# Patient Record
Sex: FEMALE | Race: OTHER | Employment: OTHER | URBAN - METROPOLITAN AREA
[De-identification: names, ages, dates, MRNs, and addresses within clinical notes are randomized per-mention and may not be internally consistent; named-entity substitution may affect disease eponyms.]

---

## 2017-01-26 DIAGNOSIS — Z12.31 ENCOUNTER FOR SCREENING MAMMOGRAM FOR MALIGNANT NEOPLASM OF BREAST: ICD-10-CM

## 2017-02-03 ENCOUNTER — GENERIC CONVERSION - ENCOUNTER (OUTPATIENT)
Dept: OTHER | Facility: OTHER | Age: 72
End: 2017-02-03

## 2017-05-03 ENCOUNTER — ALLSCRIPTS OFFICE VISIT (OUTPATIENT)
Dept: OTHER | Facility: OTHER | Age: 72
End: 2017-05-03

## 2017-05-03 DIAGNOSIS — M25.511 PAIN IN RIGHT SHOULDER: ICD-10-CM
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
## 2017-05-05 ENCOUNTER — GENERIC CONVERSION - ENCOUNTER (OUTPATIENT)
Dept: OTHER | Facility: OTHER | Age: 72
End: 2017-05-05

## 2017-05-24 ENCOUNTER — ALLSCRIPTS OFFICE VISIT (OUTPATIENT)
Dept: OTHER | Facility: OTHER | Age: 72
End: 2017-05-24

## 2017-05-25 ENCOUNTER — GENERIC CONVERSION - ENCOUNTER (OUTPATIENT)
Dept: OTHER | Facility: OTHER | Age: 72
End: 2017-05-25

## 2017-06-21 ENCOUNTER — ALLSCRIPTS OFFICE VISIT (OUTPATIENT)
Dept: OTHER | Facility: OTHER | Age: 72
End: 2017-06-21

## 2017-06-21 DIAGNOSIS — M79.671 PAIN OF RIGHT FOOT: ICD-10-CM

## 2017-06-21 DIAGNOSIS — M54.50 LOW BACK PAIN: ICD-10-CM

## 2017-06-21 DIAGNOSIS — M25.511 PAIN IN RIGHT SHOULDER: ICD-10-CM

## 2017-06-21 LAB
BILIRUB UR QL STRIP: NEGATIVE
CLARITY UR: NORMAL
COLOR UR: YELLOW
GLUCOSE (HISTORICAL): NEGATIVE
HGB UR QL STRIP.AUTO: NEGATIVE
KETONES UR STRIP-MCNC: NEGATIVE MG/DL
LEUKOCYTE ESTERASE UR QL STRIP: NEGATIVE
NITRITE UR QL STRIP: NEGATIVE
PH UR STRIP.AUTO: 5 [PH]
PROT UR STRIP-MCNC: NEGATIVE MG/DL
SP GR UR STRIP.AUTO: 1.03
UROBILINOGEN UR QL STRIP.AUTO: 0.2

## 2017-07-14 ENCOUNTER — GENERIC CONVERSION - ENCOUNTER (OUTPATIENT)
Dept: OTHER | Facility: OTHER | Age: 72
End: 2017-07-14

## 2017-07-26 ENCOUNTER — ALLSCRIPTS OFFICE VISIT (OUTPATIENT)
Dept: OTHER | Facility: OTHER | Age: 72
End: 2017-07-26

## 2017-10-23 ENCOUNTER — ALLSCRIPTS OFFICE VISIT (OUTPATIENT)
Dept: OTHER | Facility: OTHER | Age: 72
End: 2017-10-23

## 2017-10-23 DIAGNOSIS — E78.5 HYPERLIPIDEMIA: ICD-10-CM

## 2017-10-24 NOTE — PROGRESS NOTES
Assessment  1  Hyperlipidemia (272 4) (E78 5)   2  Vitamin D deficiency (268 9) (E55 9)   3  GERD without esophagitis (530 81) (K21 9)   4  Chronic lower back pain (724 2,338 29) (M54 5,G89 29)    Plan  Encounter for screening mammogram for malignant neoplasm of breast    · * MAMMO SCREENING BILATERAL W CAD; Status:Canceled - Scheduling; Hyperlipidemia    · (1) CBC/PLT/DIFF; Status:Active; Requested [id number];    · (1) COMPREHENSIVE METABOLIC PANEL; Status:Active; Requested [id number]:[id number];    · (1) LIPID PANEL FASTING W DIRECT LDL REFLEX; Status:Active; Requested [id number];    · (1) TSH; Status:Active; Requested RHR:[id number];    · Follow-up visit in 6 months Evaluation and Treatment  Follow-up  Status: Hold For - Scheduling   Requested for: 82SVE1277   · Eat a low fat and low cholesterol diet ; Status:Complete;   Done: [id number]  PMH: History of pneumococcal vaccination    · Prevnar 13 Intramuscular Suspension; INJECT 0 5  ML Intramuscular; To Be Done:  21Jun2017  Vitamin D deficiency    · (1) VITAMIN D 25-HYDROXY; Status:Active; Requested [id number];     Discussion/Summary  Discussion Summary:   Doing well and will check labs  Continue current treatment  Keep f/u with DPM  Refusing FIT and colonoscopy  Unable to get vaccines  Medication SE Review and Pt Understands Tx: Possible side effects of new medications were reviewed with the patient/guardian today  The treatment plan was reviewed with the patient/guardian  The patient/guardian understands and agrees with the treatment plan      Chief Complaint  Chief Complaint Free Text Note Form: RTN - Esophageal Reflux  Renetta Claudia Taylor No new complaints    still has right heel spur/pain  History of Present Illness  HPI: HF RTC for f/u hyperlipidemia, GERD, etc  Doing ok  Still dealing with and heel spur and seeing DPM  Due for labs  Unable to get the flu vaccine and refusing pneumonia  Refusing colonoscopy   Mammo and dexa due next vist    Chronic Pain (Follow-Up): The patient is being seen for follow-up of chronic neck pain and chronic back pain  The patient reports no change in the condition  There are no comorbid illnesses  She has had no significant interval events  Interval symptoms:  stable neck pain,-- stable back pain,-- denies upper extremity pain-- and-- denies lower extremity pain  No associated symptoms are reported  Medications include acetaminophen and nonsteroidal anti-inflammatory drugs  Medications:  the patient is adherent to her medication regimen, but-- she denies medication side effects  Disease management:  the patient is doing well with her goals  Gastroesophageal Reflux Disease (Follow-Up): The patient is being seen for follow-up of gastroesophageal reflux disease  The patient reports doing well  There are no comorbid illnesses  She has had no significant interval events  The patient is currently asymptomatic  No associated symptoms are reported  Medications include pantoprazole (Protonix)  Medications:  the patient is adherent to her medication regimen, but-- she denies medication side effects  Disease management:  the patient is doing well with her goals  Hyperlipidemia (Follow-Up): The patient states her hyperlipidemia has been under good control since the last visit  She has no comorbid illnesses  She has no significant interval events  Symptoms: The patient is currently asymptomatic  Associated symptoms include no focal neurologic deficits-- and-- no memory loss  Medications: The patient is not currently on any medications for her hyperlipidemia  The patient is doing well with her hyperlipidemia goals  The patient is due for a lipid panel  Review of Systems  Complete-Female:   Constitutional: no fever,-- not feeling poorly,-- no chills-- and-- not feeling tired  Cardiovascular: no chest pain,-- no intermittent leg claudication,-- no palpitations-- and-- no lower extremity edema     Respiratory: no shortness of breath,-- no cough,-- no orthopnea,-- no wheezing,-- no shortness of breath during exertion-- and-- no PND  Gastrointestinal: no abdominal pain,-- no nausea,-- no constipation-- and-- no diarrhea  Genitourinary: no dysuria  Musculoskeletal: arthralgias-- and-- joint stiffness, but-- no joint swelling  Integumentary: No complaints of skin rash or lesions, no itching, no skin wounds, no breast pain or lump  Neurological: no headache,-- no confusion-- and-- no convulsions  Psychiatric: no anxiety-- and-- no depression  Endocrine: No complaints of proptosis, no hot flashes, no muscle weakness, no deepening of the voice, no feelings of weakness  Hematologic/Lymphatic: No complaints of swollen glands, no swollen glands in the neck, does not bleed easily, does not bruise easily  Active Problems  1  Chronic lower back pain (724 2,338 29) (M54 5,G89 29)   2  Chronic right shoulder pain (719 41,338 29) (M25 511,G89 29)   3  Encounter for screening mammogram for malignant neoplasm of breast (V76 12) (Z12 31)   4  Gait abnormality (781 2) (R26 9)   5  GERD without esophagitis (530 81) (K21 9)   6  Hyperlipidemia (272 4) (E78 5)   7  Nephrolithiasis (592 0) (N20 0)   8  Post-menopausal (V49 81) (Z78 0)   9  Sciatica of left side (724 3) (M54 32)   10  Screening for colon cancer (V76 51) (Z12 11)   11  Screening for genitourinary condition (V81 6) (Z13 89)   12  Screening for neurological condition (V80 09) (Z13 89)   13  Vitamin D deficiency (268 9) (E55 9)    Past Medical History  1  History of Former tobacco use (V15 82) (K93 254)   2  History of esophageal reflux (V12 79) (Z87 19)  Active Problems And Past Medical History Reviewed: The active problems and past medical history were reviewed and updated today  Surgical History  1  History of Oral Surgery Tooth Extraction   2  History of Tonsillectomy   3  History of Tubal Ligation  Surgical History Reviewed:    The surgical history was reviewed and updated today  Family History  Mother    1  Family history of Sheehans syndrome  Father    2  Family history of Aneurysm   3  Family history of kidney stones (V18 69) (Z84 1)  Sister    4  Family history of hypertension (V17 49) (Z82 49)  Brother    5  Family history of Alcoholic cirrhosis of liver  Family History Reviewed: The family history was reviewed and updated today  Social History   · Former smoker (Z72 08) (M22 033)   ·    · Occasional alcohol use  Social History Reviewed: The social history was reviewed and updated today  The social history was reviewed and is unchanged  Current Meds   1  Aleve 220 MG Oral Tablet Recorded   2  Pantoprazole Sodium 20 MG Oral Tablet Delayed Release; TAKE 1 TABLET DAILY; Therapy: 71NWS7678 to (Evaluate:05Jan2017); Last Rx:11Jan2016 Ordered  Medication List Reviewed: The medication list was reviewed and updated today  Allergies  1  Fluarix SUSP   2  Lasix TABS   3  PredniSONE TABS    Vitals  Vital Signs    Recorded: 76PXI7587 02:40PM   Temperature 98 6 F   Heart Rate 78   Respiration 16   Systolic 606   Diastolic 60   Height 4 ft 11 in   Weight 172 lb    BMI Calculated 34 74   BSA Calculated 1 73     Physical Exam    Constitutional   General appearance: No acute distress, well appearing and well nourished  Eyes   Conjunctiva and lids: No swelling, erythema or discharge  Pupils and irises: Equal, round and reactive to light  Ears, Nose, Mouth, and Throat   Oropharynx: Normal with no erythema, edema, exudate or lesions  Pulmonary   Respiratory effort: No increased work of breathing or signs of respiratory distress  Auscultation of lungs: Clear to auscultation  Cardiovascular   Auscultation of heart: Normal rate and rhythm, normal S1 and S2, without murmurs  Examination of extremities for edema and/or varicosities: Normal     Carotid pulses: Normal     Abdomen   Abdomen: Non-tender, no masses      Musculoskeletal   Gait and station: Normal     Psychiatric   Orientation to person, place, and time: Normal     Mood and affect: Normal          Signatures   Electronically signed by : LIZZY Melton ; Oct 23 2017  2:57PM EST                       (Author)

## 2017-10-27 ENCOUNTER — APPOINTMENT (OUTPATIENT)
Dept: LAB | Facility: CLINIC | Age: 72
End: 2017-10-27
Payer: COMMERCIAL

## 2017-10-27 ENCOUNTER — TRANSCRIBE ORDERS (OUTPATIENT)
Dept: LAB | Facility: CLINIC | Age: 72
End: 2017-10-27

## 2017-10-27 DIAGNOSIS — E78.5 HYPERLIPIDEMIA: ICD-10-CM

## 2017-10-27 LAB
25(OH)D3 SERPL-MCNC: 15.5 NG/ML (ref 30–100)
ALBUMIN SERPL BCP-MCNC: 3.7 G/DL (ref 3.5–5)
ALP SERPL-CCNC: 89 U/L (ref 46–116)
ALT SERPL W P-5'-P-CCNC: 47 U/L (ref 12–78)
ANION GAP SERPL CALCULATED.3IONS-SCNC: 4 MMOL/L (ref 4–13)
AST SERPL W P-5'-P-CCNC: 33 U/L (ref 5–45)
BASOPHILS # BLD AUTO: 0.05 THOUSANDS/ΜL (ref 0–0.1)
BASOPHILS NFR BLD AUTO: 1 % (ref 0–1)
BILIRUB SERPL-MCNC: 0.51 MG/DL (ref 0.2–1)
BUN SERPL-MCNC: 13 MG/DL (ref 5–25)
CALCIUM SERPL-MCNC: 9.3 MG/DL (ref 8.3–10.1)
CHLORIDE SERPL-SCNC: 103 MMOL/L (ref 100–108)
CHOLEST SERPL-MCNC: 218 MG/DL (ref 50–200)
CO2 SERPL-SCNC: 31 MMOL/L (ref 21–32)
CREAT SERPL-MCNC: 0.61 MG/DL (ref 0.6–1.3)
EOSINOPHIL # BLD AUTO: 0.69 THOUSAND/ΜL (ref 0–0.61)
EOSINOPHIL NFR BLD AUTO: 13 % (ref 0–6)
ERYTHROCYTE [DISTWIDTH] IN BLOOD BY AUTOMATED COUNT: 14.2 % (ref 11.6–15.1)
GFR SERPL CREATININE-BSD FRML MDRD: 91 ML/MIN/1.73SQ M
GLUCOSE P FAST SERPL-MCNC: 93 MG/DL (ref 65–99)
HCT VFR BLD AUTO: 39.6 % (ref 34.8–46.1)
HDLC SERPL-MCNC: 65 MG/DL (ref 40–60)
HGB BLD-MCNC: 12.7 G/DL (ref 11.5–15.4)
LDLC SERPL CALC-MCNC: 128 MG/DL (ref 0–100)
LYMPHOCYTES # BLD AUTO: 2.05 THOUSANDS/ΜL (ref 0.6–4.47)
LYMPHOCYTES NFR BLD AUTO: 38 % (ref 14–44)
MCH RBC QN AUTO: 28.3 PG (ref 26.8–34.3)
MCHC RBC AUTO-ENTMCNC: 32.1 G/DL (ref 31.4–37.4)
MCV RBC AUTO: 88 FL (ref 82–98)
MONOCYTES # BLD AUTO: 0.35 THOUSAND/ΜL (ref 0.17–1.22)
MONOCYTES NFR BLD AUTO: 7 % (ref 4–12)
NEUTROPHILS # BLD AUTO: 2.2 THOUSANDS/ΜL (ref 1.85–7.62)
NEUTS SEG NFR BLD AUTO: 41 % (ref 43–75)
NRBC BLD AUTO-RTO: 0 /100 WBCS
PLATELET # BLD AUTO: 244 THOUSANDS/UL (ref 149–390)
PMV BLD AUTO: 12 FL (ref 8.9–12.7)
POTASSIUM SERPL-SCNC: 4.7 MMOL/L (ref 3.5–5.3)
PROT SERPL-MCNC: 7.4 G/DL (ref 6.4–8.2)
RBC # BLD AUTO: 4.48 MILLION/UL (ref 3.81–5.12)
SODIUM SERPL-SCNC: 138 MMOL/L (ref 136–145)
TRIGL SERPL-MCNC: 126 MG/DL
TSH SERPL DL<=0.05 MIU/L-ACNC: 2.07 UIU/ML (ref 0.36–3.74)
WBC # BLD AUTO: 5.35 THOUSAND/UL (ref 4.31–10.16)

## 2017-10-27 PROCEDURE — 85025 COMPLETE CBC W/AUTO DIFF WBC: CPT

## 2017-10-27 PROCEDURE — 84443 ASSAY THYROID STIM HORMONE: CPT

## 2017-10-27 PROCEDURE — 80053 COMPREHEN METABOLIC PANEL: CPT

## 2017-10-27 PROCEDURE — 82306 VITAMIN D 25 HYDROXY: CPT

## 2017-10-27 PROCEDURE — 36415 COLL VENOUS BLD VENIPUNCTURE: CPT

## 2017-10-27 PROCEDURE — 80061 LIPID PANEL: CPT

## 2017-10-28 ENCOUNTER — GENERIC CONVERSION - ENCOUNTER (OUTPATIENT)
Dept: OTHER | Facility: OTHER | Age: 72
End: 2017-10-28

## 2017-11-09 ENCOUNTER — GENERIC CONVERSION - ENCOUNTER (OUTPATIENT)
Dept: OTHER | Facility: OTHER | Age: 72
End: 2017-11-09

## 2018-01-12 VITALS
DIASTOLIC BLOOD PRESSURE: 70 MMHG | HEART RATE: 76 BPM | SYSTOLIC BLOOD PRESSURE: 102 MMHG | WEIGHT: 172 LBS | RESPIRATION RATE: 16 BRPM | HEIGHT: 59 IN | TEMPERATURE: 96.8 F | BODY MASS INDEX: 34.68 KG/M2

## 2018-01-12 NOTE — PROGRESS NOTES
Assessment    1  Sciatica of left side (724 3) (M54 32)   2  Left hip pain (719 45) (M25 552)   3  Severe left inguinal pain (789 09) (R10 30)   4  Hyperlipidemia (272 4) (E78 5)   5  Vitamin D deficiency (268 9) (E55 9)    Plan  Left hip pain, Sciatica of left side, Severe left inguinal pain    · Cyclobenzaprine HCl - 5 MG Oral Tablet; TAKE 1 TABLET 3 TIMES DAILY AS NEEDED   · PredniSONE 10 MG Oral Tablet; 40mg po q day x 5, 30mg po q day x5, 20mg po q day x5,  10mg po q day x 5 then d/c   · Betamethasone Sod Phos & Acet 6 (3-3) MG/ML Injection Suspension   · *1 - [facility] Physical Therapy  Consult  Status: Active  Requested for: 78NUZ7173  01:00PM  Care Summary provided  : Yes    Discussion/Summary  Discussion Summary:   No improvement with NSAID's and xray negative  Start PT and trial of steroids  NO NSAID's and will add muscle relaxant  Start otc vit d and pt wants to try a STEP I diet first  RTC two weeks  May need MRI or Ortho eveal       Chief Complaint  Chief Complaint Free Text Note Form: Pt is here for a f/u visit on hip pain and review x-ray  Pt stated it hurts the most when she is sleeping, she starting sleeping with a pillow between her legs  Pt has no been able to taker her medication yet because they have not arrived  History of Present Illness  HPI: HF RTC for f/u left hip and groin pain felt to be due to MS reasons  Started on NSAID's and little improvement  Xray was negative  Labs done and Total Cholesterol and LDL elevated and Vit d level low  No new c/o's  Pain is a 8/10 and has difficulty walking at times  Now reports pain over the left buttock and radiates down into the hip and groin  Review of Systems  Complete-Female:   Constitutional: feeling poorly, but no fever, no chills and not feeling tired  Cardiovascular: no chest pain  Respiratory: no shortness of breath  Gastrointestinal: no abdominal pain, no nausea, no constipation and no diarrhea     Genitourinary: No complaints of dysuria, no incontinence, no pelvic pain, no dysmenorrhea, no vaginal discharge or bleeding  Musculoskeletal: as noted in HPI  Active Problems    1  Abnormal EKG (794 31) (R94 31)   2  Esophageal reflux (530 81) (K21 9)   3  Gait abnormality (781 2) (R26 9)   4  Hyperlipidemia (272 4) (E78 5)   5  Left hip pain (719 45) (M25 552)   6  Nephrolithiasis (592 0) (N20 0)   7  Post-menopausal (V49 81) (Z78 0)   8  Severe left inguinal pain (789 09) (R10 30)   9  Vitamin D deficiency (268 9) (E55 9)    Past Medical History    1  History of Former tobacco use (V15 82) (D14 813)   2  History of esophageal reflux (V12 79) (Z87 19)  Active Problems And Past Medical History Reviewed: The active problems and past medical history were reviewed and updated today  Surgical History    1  History of Oral Surgery Tooth Extraction   2  History of Tonsillectomy   3  History of Tubal Ligation  Surgical History Reviewed: The surgical history was reviewed and updated today  Family History    1  Family history of Sheehans syndrome    2  Family history of Aneurysm   3  Family history of kidney stones (V18 69) (Z84 1)    4  Family history of hypertension (V17 49) (Z82 49)    5  Family history of Alcoholic cirrhosis of liver  Family History Reviewed: The family history was reviewed and updated today  Social History    · Former smoker (R05 70) (I53 490)   ·    · Occasional alcohol use  Social History Reviewed: The social history was reviewed and updated today  The social history was reviewed and is unchanged  Current Meds   1  EC-Naprosyn 500 MG Oral Tablet Delayed Release; TAKE (1) TABLET BY MOUTH TWICE A DAY; Therapy: 79JSN4941 to (Evaluate:05Jan2017)  Requested for: 37MRS3096; Last Rx:11Jan2016   Ordered   2  Pantoprazole Sodium 20 MG Oral Tablet Delayed Release; TAKE 1 TABLET DAILY; Therapy: 61GOR0697 to (Evaluate:05Jan2017);  Last Rx:11Jan2016 Ordered  Medication List Reviewed: The medication list was reviewed and updated today  Allergies    1  Fluarix Intramuscular Suspension   2  Lasix TABS    Vitals  Vital Signs [Data Includes: Current Encounter]    Recorded: 00FLS2823 11:32AM   Temperature 96 9 F   Heart Rate 73   Respiration 17   Systolic 295   Diastolic 70   Height 4 ft 11 in   Weight 174 lb 2 oz   BMI Calculated 35 17   BSA Calculated 1 74     Physical Exam    Constitutional   General appearance: No acute distress, well appearing and well nourished  Eyes   Conjunctiva and lids: No swelling, erythema or discharge  Pupils and irises: Equal, round and reactive to light  Ears, Nose, Mouth, and Throat   Oropharynx: Normal with no erythema, edema, exudate or lesions  Pulmonary   Respiratory effort: No increased work of breathing or signs of respiratory distress  Auscultation of lungs: Clear to auscultation  Cardiovascular   Auscultation of heart: Normal rate and rhythm, normal S1 and S2, without murmurs  Examination of extremities for edema and/or varicosities: Normal     Abdomen   Abdomen: Non-tender, no masses  Musculoskeletal   Gait and station: Normal     Additional Exam:  LS spine/left hip w/o gross deformity, increase temp, erythema, or swelling  Tenderness over the left sciatic area, left trochanteric bursa  ROM and joint integrity intact  ROM LS spine limited all planes by 50%  DTR 2/4  Future Appointments    Date/Time Provider Specialty Site   02/17/2016 11:30 AM LIZZY Friedman   Internal Medicine [address] [place] [address]     Signatures   Electronically signed by : LIZZY Gurrola ; Jan 27 2016  2:40PM EST                       (Author)

## 2018-01-13 VITALS
HEIGHT: 59 IN | RESPIRATION RATE: 16 BRPM | DIASTOLIC BLOOD PRESSURE: 68 MMHG | SYSTOLIC BLOOD PRESSURE: 106 MMHG | HEART RATE: 76 BPM | WEIGHT: 172 LBS | TEMPERATURE: 98.9 F | BODY MASS INDEX: 34.68 KG/M2

## 2018-01-13 VITALS
SYSTOLIC BLOOD PRESSURE: 120 MMHG | WEIGHT: 171 LBS | TEMPERATURE: 97.5 F | BODY MASS INDEX: 34.47 KG/M2 | DIASTOLIC BLOOD PRESSURE: 68 MMHG | HEIGHT: 59 IN | HEART RATE: 82 BPM | RESPIRATION RATE: 18 BRPM

## 2018-01-13 VITALS
TEMPERATURE: 97 F | DIASTOLIC BLOOD PRESSURE: 70 MMHG | RESPIRATION RATE: 18 BRPM | HEART RATE: 84 BPM | BODY MASS INDEX: 34.68 KG/M2 | HEIGHT: 59 IN | WEIGHT: 172 LBS | SYSTOLIC BLOOD PRESSURE: 114 MMHG

## 2018-01-14 NOTE — RESULT NOTES
Verified Results  (1) LIPID PANEL FASTING W DIRECT LDL REFLEX 27Oct2017 01:11PM Pita De León Order Number: [id number]     Test Name Result Flag Reference   CHOLESTEROL 218 mg/dL H    LDL CHOLESTEROL CALCULATED 128 mg/dL H 0-100   Triglyceride:        Normal <150 mg/dl   Borderline High 150-199 mg/dl   High 200-499 mg/dl   Very High >499 mg/dl      Cholesterol:       Desirable <200 mg/dl    Borderline High 200-239 mg/dl    High >239 mg/dl      HDL Cholesterol:       High>59 mg/dL    Low <41 mg/dL      HDL Cholesterol:       High>59 mg/dL    Low <41 mg/dL      This screening LDL is a calculated result  It does not have the accuracy of the Direct Measured LDL in the monitoring of patients with hyperlipidemia and/or statin therapy  Direct Measure LDL (IQL098) must be ordered separately in these patients  TRIGLYCERIDES 126 mg/dL  <=150   Specimen collection should occur prior to N-Acetylcysteine or Metamizole administration due to the potential for falsely depressed results  HDL,DIRECT 65 mg/dL H 40-60   Specimen collection should occur prior to Metamizole administration due to the potential for falsley depressed results  (1) CBC/PLT/DIFF 27Oct2017 01:11PM Kamille Manzano     Test Name Result Flag Reference   WBC COUNT 5 35 Thousand/uL  4 31-10 16   RBC COUNT 4 48 Million/uL  3 81-5 12   HEMOGLOBIN 12 7 g/dL  11 5-15 4   HEMATOCRIT 39 6 %  34 8-46  1   MCV 88 fL  82-98   MCH 28 3 pg  26 8-34 3   MCHC 32 1 g/dL  31 4-37 4   RDW 14 2 %  11 6-15 1   MPV 12 0 fL  8 9-12 7   PLATELET COUNT 028 Thousands/uL  149-390   nRBC AUTOMATED 0 /100 WBCs     NEUTROPHILS RELATIVE PERCENT 41 % L 43-75   LYMPHOCYTES RELATIVE PERCENT 38 %  14-44   MONOCYTES RELATIVE PERCENT 7 %  4-12   EOSINOPHILS RELATIVE PERCENT 13 % H 0-6   BASOPHILS RELATIVE PERCENT 1 %  0-1   NEUTROPHILS ABSOLUTE COUNT 2 20 Thousands/? ??L  1 85-7 62   LYMPHOCYTES ABSOLUTE COUNT 2 05 Thousands/? ??L  0 60-4 47   MONOCYTES ABSOLUTE COUNT 0 35 Thousand/? ??L  0 17-1 22   EOSINOPHILS ABSOLUTE COUNT 0 69 Thousand/? ??L H 0 00-0 61   BASOPHILS ABSOLUTE COUNT 0 05 Thousands/? ??L  0 00-0 10   This is a patient instruction: This test is non-fasting  Please drink two glasses of water morning of bloodwork  (1) VITAMIN D 25-HYDROXY [id number] 01:11PM Ivana Carey     Test Name Result Flag Reference   VIT D 25-HYDROX 15 5 ng/mL L 30 0-100 0   This assay is a certified procedure of the CDC Vitamin D Standardization Certification Program (VDSCP)     Deficiency <20ng/ml   Insufficiency 20-30ng/ml   Sufficient  ng/ml     *Patients undergoing fluorescein dye angiography may retain small amounts of fluorescein in the body for 48-72 hours post procedure  Samples containing fluorescein can produce falsely elevated Vitamin D values  If the patient had this procedure, a specimen should be resubmitted post fluorescein clearance  (1) COMPREHENSIVE METABOLIC PANEL [id number] 45:17VF Ivana Carey     Test Name Result Flag Reference   SODIUM 138 mmol/L  136-145   POTASSIUM 4 7 mmol/L  3 5-5 3   CHLORIDE 103 mmol/L  100-108   CARBON DIOXIDE 31 mmol/L  21-32   ANION GAP (CALC) 4 mmol/L  4-13   BLOOD UREA NITROGEN 13 mg/dL  5-25   CREATININE 0 61 mg/dL  0 60-1 30   Standardized to IDMS reference method   CALCIUM 9 3 mg/dL  8 3-10 1   BILI, TOTAL 0 51 mg/dL  0 20-1 00   ALK PHOSPHATAS 89 U/L     ALT (SGPT) 47 U/L  12-78   Specimen collection should occur prior to Sulfasalazine and/or Sulfapyridine administration due to the potential for falsely depressed results  AST(SGOT) 33 U/L  5-45   Specimen collection should occur prior to Sulfasalazine administration due to the potential for falsely depressed results     ALBUMIN 3 7 g/dL  3 5-5 0   TOTAL PROTEIN 7 4 g/dL  6 4-8 2   eGFR 91 ml/min/1 73sq m     National Kidney Disease Education Program recommendations are as follows:  GFR calculation is accurate only with a steady state creatinine  Chronic Kidney disease less than 60 ml/min/1 73 sq  meters  Kidney failure less than 15 ml/min/1 73 sq  meters  GLUCOSE FASTING 93 mg/dL  65-99   Specimen collection should occur prior to Sulfasalazine administration due to the potential for falsely depressed results  Specimen collection should occur prior to Sulfapyridine administration due to the potential for falsely elevated results  (1) TSH [id number] 01:11PM Anuj Montana     Test Name Result Flag Reference   TSH 2 070 uIU/mL  0 358-3 740   This is a patient instruction: This test is non-fasting  Please drink two glasses of water morning of bloodwork  Patients undergoing fluorescein dye angiography may retain small amounts of fluorescein in the body for 48-72 hours post procedure  Samples containing fluorescein can produce falsely depressed TSH values  If the patient had this procedure,a specimen should be resubmitted post fluorescein clearance            The recommended reference ranges for TSH during pregnancy are as follows:  First trimester 0 1 to 2 5 uIU/mL  Second trimester  0 2 to 3 0 uIU/mL  Third trimester 0 3 to 3 0 uIU/m

## 2018-01-15 VITALS
RESPIRATION RATE: 16 BRPM | BODY MASS INDEX: 34.68 KG/M2 | HEIGHT: 59 IN | WEIGHT: 172 LBS | SYSTOLIC BLOOD PRESSURE: 118 MMHG | HEART RATE: 78 BPM | TEMPERATURE: 98.6 F | DIASTOLIC BLOOD PRESSURE: 60 MMHG

## 2018-03-06 ENCOUNTER — TRANSCRIBE ORDERS (OUTPATIENT)
Dept: RADIOLOGY | Facility: CLINIC | Age: 73
End: 2018-03-06

## 2018-03-06 ENCOUNTER — OFFICE VISIT (OUTPATIENT)
Dept: INTERNAL MEDICINE CLINIC | Facility: CLINIC | Age: 73
End: 2018-03-06
Payer: COMMERCIAL

## 2018-03-06 ENCOUNTER — APPOINTMENT (OUTPATIENT)
Dept: RADIOLOGY | Facility: CLINIC | Age: 73
End: 2018-03-06
Payer: COMMERCIAL

## 2018-03-06 VITALS
WEIGHT: 177 LBS | RESPIRATION RATE: 16 BRPM | DIASTOLIC BLOOD PRESSURE: 68 MMHG | SYSTOLIC BLOOD PRESSURE: 106 MMHG | BODY MASS INDEX: 35.68 KG/M2 | HEART RATE: 88 BPM | HEIGHT: 59 IN | TEMPERATURE: 98.7 F

## 2018-03-06 DIAGNOSIS — M54.41 CHRONIC RIGHT-SIDED LOW BACK PAIN WITH RIGHT-SIDED SCIATICA: Primary | ICD-10-CM

## 2018-03-06 DIAGNOSIS — G89.29 CHRONIC RIGHT-SIDED LOW BACK PAIN WITH RIGHT-SIDED SCIATICA: Primary | ICD-10-CM

## 2018-03-06 DIAGNOSIS — IMO0001 RADICULAR PAIN OF RIGHT LOWER BACK: ICD-10-CM

## 2018-03-06 LAB
SL AMB  POCT GLUCOSE, UA: NEGATIVE
SL AMB LEUKOCYTE ESTERASE,UA: NEGATIVE
SL AMB POCT BILIRUBIN,UA: NEGATIVE
SL AMB POCT BLOOD,UA: NEGATIVE
SL AMB POCT COLOR,UA: YELLOW
SL AMB POCT KETONES,UA: NEGATIVE
SL AMB POCT NITRITE,UA: NEGATIVE
SL AMB POCT RBC UA: NEGATIVE
SL AMB POCT SPECIFIC GRAVITY,UA: 1.02
SL AMB POCT WBC UA: NEGATIVE

## 2018-03-06 PROCEDURE — 81000 URINALYSIS NONAUTO W/SCOPE: CPT | Performed by: INTERNAL MEDICINE

## 2018-03-06 PROCEDURE — 1101F PT FALLS ASSESS-DOCD LE1/YR: CPT | Performed by: INTERNAL MEDICINE

## 2018-03-06 PROCEDURE — 99213 OFFICE O/P EST LOW 20 MIN: CPT | Performed by: INTERNAL MEDICINE

## 2018-03-06 PROCEDURE — 72110 X-RAY EXAM L-2 SPINE 4/>VWS: CPT

## 2018-03-06 PROCEDURE — 3725F SCREEN DEPRESSION PERFORMED: CPT | Performed by: INTERNAL MEDICINE

## 2018-03-06 PROCEDURE — 3008F BODY MASS INDEX DOCD: CPT | Performed by: INTERNAL MEDICINE

## 2018-03-06 RX ORDER — CELECOXIB 200 MG/1
1 CAPSULE ORAL DAILY
COMMUNITY
Start: 2017-07-26 | End: 2018-04-23

## 2018-03-06 RX ORDER — NAPROXEN SODIUM 220 MG
TABLET ORAL
COMMUNITY
End: 2019-07-22

## 2018-03-06 RX ORDER — PANTOPRAZOLE SODIUM 20 MG/1
1 TABLET, DELAYED RELEASE ORAL DAILY
COMMUNITY
Start: 2016-01-08 | End: 2019-07-22

## 2018-03-06 RX ORDER — DICLOFENAC SODIUM 75 MG/1
50 TABLET, DELAYED RELEASE ORAL 2 TIMES DAILY
COMMUNITY
End: 2018-04-23 | Stop reason: SDUPTHER

## 2018-03-06 NOTE — PROGRESS NOTES
Assessment/Plan:    No problem-specific Assessment & Plan notes found for this encounter  Diagnoses and all orders for this visit:    Chronic right-sided low back pain with right-sided sciatica  -     POCT urine dip non-auto scope    Radicular pain of right lower back  -     XR spine lumbar minimum 4 views non injury; Future  -     MRI lumbar spine wo contrast; Future    Other orders  -     pantoprazole (PROTONIX) 20 mg tablet; Take 1 tablet by mouth daily  -     celecoxib (CELEBREX) 200 mg capsule; Take 1 capsule by mouth daily  -     naproxen sodium (ALEVE) 220 MG tablet; Take by mouth  -     diclofenac (VOLTAREN) 75 mg EC tablet; Take 50 mg by mouth 2 (two) times a day      A/P: Refusing NSAID's, steroids, muscle relaxants  Will check xray and MRI  Discussed pain management, but doesn't want injections  Wants to know what is wrong  UA was negative and not classic for a renal stone  RTC after the imaging studies  Subjective:      Patient ID: Nayely Guzmán is a 67 y o  female  HF presents c/o continued right LBP with radiation into her right buttocks and anterior thigh  No injury and has been getting PT for both her back and shoulder  Taking NSAID's w/o relief  Reports urinating frequently and has a hx of renal stones, but no gross blood and no bowel or bladder incontinence  The following portions of the patient's history were reviewed and updated as appropriate:   She  has no past medical history on file  She There are no active problems to display for this patient  She  has a past surgical history that includes Tubal ligation and Tonsillectomy  Her family history includes No Known Problems in her father and mother  She  reports that she has quit smoking  She has never used smokeless tobacco  She reports that she drinks alcohol  She reports that she does not use drugs    Current Outpatient Prescriptions   Medication Sig Dispense Refill    diclofenac (VOLTAREN) 75 mg EC tablet Take 50 mg by mouth 2 (two) times a day      naproxen sodium (ALEVE) 220 MG tablet Take by mouth      pantoprazole (PROTONIX) 20 mg tablet Take 1 tablet by mouth daily      celecoxib (CELEBREX) 200 mg capsule Take 1 capsule by mouth daily       No current facility-administered medications for this visit  No current outpatient prescriptions on file prior to visit  No current facility-administered medications on file prior to visit  She is allergic to flu virus vaccine; furosemide; and prednisone       Review of Systems   Constitutional: Negative for activity change, chills, diaphoresis, fatigue and fever  Respiratory: Negative for cough, chest tightness, shortness of breath and wheezing  Cardiovascular: Negative for chest pain, palpitations and leg swelling  Gastrointestinal: Negative for abdominal pain, constipation, diarrhea, nausea and vomiting  Genitourinary: Positive for flank pain  Negative for decreased urine volume, difficulty urinating, dysuria, frequency and hematuria  Musculoskeletal: Positive for back pain  Negative for arthralgias, gait problem and myalgias  Neurological: Negative for light-headedness and headaches  Psychiatric/Behavioral: Negative for confusion  The patient is not nervous/anxious  Objective:      /68   Pulse 88   Temp 98 7 °F (37 1 °C) (Tympanic)   Resp 16   Ht 4' 11" (1 499 m)   Wt 80 3 kg (177 lb)   BMI 35 75 kg/m²          Physical Exam   Constitutional: She is oriented to person, place, and time  She appears well-developed and well-nourished  No distress  HENT:   Head: Normocephalic and atraumatic  Mouth/Throat: Oropharynx is clear and moist    Eyes: Conjunctivae and EOM are normal  Pupils are equal, round, and reactive to light  Cardiovascular: Normal rate, regular rhythm and normal heart sounds  Pulmonary/Chest: Effort normal and breath sounds normal  No respiratory distress  She has no wheezes  Abdominal: Soft   Bowel sounds are normal  She exhibits no distension  There is no tenderness  Musculoskeletal:   LS spine w/o gross deformity, increase temp, erythema, or swelling  No lesions  ROM decrease in all planes by 50%  Tenderness w/o spasms right L3-S1  LE rom and strength 5/5 Tone wnl  No gait abnormality  Neurological: She is alert and oriented to person, place, and time  Psychiatric: She has a normal mood and affect  Her behavior is normal  Judgment and thought content normal    Nursing note and vitals reviewed

## 2018-04-23 ENCOUNTER — OFFICE VISIT (OUTPATIENT)
Dept: INTERNAL MEDICINE CLINIC | Facility: CLINIC | Age: 73
End: 2018-04-23
Payer: COMMERCIAL

## 2018-04-23 VITALS
HEIGHT: 59 IN | RESPIRATION RATE: 18 BRPM | TEMPERATURE: 97.9 F | HEART RATE: 80 BPM | WEIGHT: 172 LBS | SYSTOLIC BLOOD PRESSURE: 112 MMHG | BODY MASS INDEX: 34.68 KG/M2 | DIASTOLIC BLOOD PRESSURE: 68 MMHG

## 2018-04-23 DIAGNOSIS — Z11.59 NEED FOR HEPATITIS C SCREENING TEST: ICD-10-CM

## 2018-04-23 DIAGNOSIS — M54.5 CHRONIC LOW BACK PAIN, UNSPECIFIED BACK PAIN LATERALITY, WITH SCIATICA PRESENCE UNSPECIFIED: ICD-10-CM

## 2018-04-23 DIAGNOSIS — M54.50 RIGHT-SIDED LOW BACK PAIN WITHOUT SCIATICA, UNSPECIFIED CHRONICITY: Primary | ICD-10-CM

## 2018-04-23 DIAGNOSIS — E78.2 MIXED HYPERLIPIDEMIA: ICD-10-CM

## 2018-04-23 DIAGNOSIS — Z12.11 SCREEN FOR COLON CANCER: ICD-10-CM

## 2018-04-23 DIAGNOSIS — Z12.39 SCREENING FOR BREAST CANCER: ICD-10-CM

## 2018-04-23 DIAGNOSIS — E55.9 VITAMIN D DEFICIENCY: ICD-10-CM

## 2018-04-23 DIAGNOSIS — K21.9 GERD WITHOUT ESOPHAGITIS: ICD-10-CM

## 2018-04-23 DIAGNOSIS — Z13.820 SCREENING FOR OSTEOPOROSIS: ICD-10-CM

## 2018-04-23 DIAGNOSIS — G89.29 CHRONIC LOW BACK PAIN, UNSPECIFIED BACK PAIN LATERALITY, WITH SCIATICA PRESENCE UNSPECIFIED: ICD-10-CM

## 2018-04-23 PROBLEM — M25.511 CHRONIC RIGHT SHOULDER PAIN: Status: ACTIVE | Noted: 2017-05-03

## 2018-04-23 PROCEDURE — 99214 OFFICE O/P EST MOD 30 MIN: CPT | Performed by: INTERNAL MEDICINE

## 2018-04-23 RX ORDER — DICLOFENAC SODIUM 75 MG/1
75 TABLET, DELAYED RELEASE ORAL 2 TIMES DAILY
Qty: 30 TABLET | Refills: 5 | Status: SHIPPED | OUTPATIENT
Start: 2018-04-23 | End: 2018-11-19

## 2018-04-23 NOTE — PROGRESS NOTES
Assessment/Plan:    No problem-specific Assessment & Plan notes found for this encounter  Diagnoses and all orders for this visit:    Right-sided low back pain without sciatica, unspecified chronicity  -     diclofenac (VOLTAREN) 75 mg EC tablet; Take 1 tablet (75 mg total) by mouth 2 (two) times a day    Mixed hyperlipidemia  -     Comprehensive metabolic panel; Future  -     LDL cholesterol, direct; Future  -     Triglycerides; Future    GERD without esophagitis    Chronic low back pain, unspecified back pain laterality, with sciatica presence unspecified    Vitamin D deficiency  -     Vitamin D 1,25 dihydroxy; Future    Screen for colon cancer  -     Occult Bloood,Fecal Immunochemical; Future    Screening for breast cancer  -     Mammo screening bilateral w 3d & cad; Future    Screening for osteoporosis  -     DXA bone density spine hip and pelvis; Future    Need for hepatitis C screening test  -     Hepatitis C antibody; Future      A/P: Doing well and will check labs  Order mammo and dexa  Refusing colonoscopy, but will do the FIT test  Refusing vaccines  Continue current meds pending the labs  RTC six months for routine  Subjective:      Patient ID: Elana Roy is a 67 y o  female  HF RTC for f/u hyperlipidemia, GERD, etc  Doing well and no new issues  Remains active w/o difficulty and no falls to report  Chronic LBP no worse  No reflux s/s  Due for labs, mammo, dexa, vaccines, and colon cancer screen          The following portions of the patient's history were reviewed and updated as appropriate:   She  has a past medical history of Abnormal electrocardiogram   She   Patient Active Problem List    Diagnosis Date Noted    Chronic lower back pain 07/26/2017    Chronic right shoulder pain 05/03/2017    Sciatica of left side 01/27/2016    GERD without esophagitis 01/08/2016    Hyperlipidemia 01/08/2016    Nephrolithiasis 01/08/2016    Vitamin D deficiency 01/08/2016     She  has a past surgical history that includes Tubal ligation; Tonsillectomy; and Tooth extraction  Her family history includes Aneurysm in her father; Cirrhosis in her brother; Hypertension in her sister; Nephrolithiasis in her father; Other in her mother  She  reports that she has quit smoking  She has never used smokeless tobacco  She reports that she drinks alcohol  She reports that she does not use drugs  Current Outpatient Prescriptions   Medication Sig Dispense Refill    diclofenac (VOLTAREN) 75 mg EC tablet Take 1 tablet (75 mg total) by mouth 2 (two) times a day 30 tablet 5    naproxen sodium (ALEVE) 220 MG tablet Take by mouth      pantoprazole (PROTONIX) 20 mg tablet Take 1 tablet by mouth daily       No current facility-administered medications for this visit  Current Outpatient Prescriptions on File Prior to Visit   Medication Sig    naproxen sodium (ALEVE) 220 MG tablet Take by mouth    pantoprazole (PROTONIX) 20 mg tablet Take 1 tablet by mouth daily    [DISCONTINUED] diclofenac (VOLTAREN) 75 mg EC tablet Take 50 mg by mouth 2 (two) times a day    [DISCONTINUED] celecoxib (CELEBREX) 200 mg capsule Take 1 capsule by mouth daily     No current facility-administered medications on file prior to visit  She is allergic to flu virus vaccine; furosemide; and prednisone       Review of Systems   Constitutional: Negative for activity change, chills, diaphoresis, fatigue and fever  HENT: Negative  Eyes: Negative for visual disturbance  Respiratory: Negative for cough, chest tightness, shortness of breath and wheezing  Cardiovascular: Negative for chest pain, palpitations and leg swelling  Gastrointestinal: Negative for abdominal pain, constipation, diarrhea, nausea and vomiting  Endocrine: Negative for cold intolerance and heat intolerance  Genitourinary: Negative for difficulty urinating, dysuria and frequency  Musculoskeletal: Positive for back pain   Negative for arthralgias, gait problem and myalgias  Neurological: Negative for dizziness, seizures, syncope, weakness, light-headedness and headaches  Psychiatric/Behavioral: Negative for confusion, dysphoric mood and sleep disturbance  The patient is not nervous/anxious  Objective:      /68   Pulse 80   Temp 97 9 °F (36 6 °C) (Tympanic)   Resp 18   Ht 4' 11" (1 499 m)   Wt 78 kg (172 lb)   BMI 34 74 kg/m²          Physical Exam   Constitutional: She is oriented to person, place, and time  She appears well-developed and well-nourished  No distress  HENT:   Head: Normocephalic and atraumatic  Mouth/Throat: Oropharynx is clear and moist    Eyes: Conjunctivae and EOM are normal  Pupils are equal, round, and reactive to light  Neck: Normal range of motion  Neck supple  No JVD present  Cardiovascular: Normal rate, regular rhythm and normal heart sounds  No murmur heard  Pulmonary/Chest: Effort normal and breath sounds normal  No respiratory distress  She has no wheezes  Abdominal: Soft  Bowel sounds are normal  She exhibits no distension  There is no tenderness  Musculoskeletal: She exhibits no edema  Neurological: She is alert and oriented to person, place, and time  Psychiatric: She has a normal mood and affect  Her behavior is normal  Judgment and thought content normal    Nursing note and vitals reviewed

## 2018-04-23 NOTE — PATIENT INSTRUCTIONS

## 2018-04-24 ENCOUNTER — TELEPHONE (OUTPATIENT)
Dept: INTERNAL MEDICINE CLINIC | Facility: CLINIC | Age: 73
End: 2018-04-24

## 2018-04-24 DIAGNOSIS — M81.0 OSTEOPOROSIS, POSTMENOPAUSAL: Primary | ICD-10-CM

## 2018-04-24 NOTE — TELEPHONE ENCOUNTER
[facility] called, you ordered bone dexa on pt with code Z13 [id number], it does not pass, needs to be M81 0, post [place] can you change order so I can fax to hospital?

## 2018-04-25 ENCOUNTER — APPOINTMENT (OUTPATIENT)
Dept: LAB | Facility: CLINIC | Age: 73
End: 2018-04-25
Payer: COMMERCIAL

## 2018-04-25 ENCOUNTER — TRANSCRIBE ORDERS (OUTPATIENT)
Dept: RADIOLOGY | Facility: CLINIC | Age: 73
End: 2018-04-25

## 2018-04-25 DIAGNOSIS — E78.2 MIXED HYPERLIPIDEMIA: ICD-10-CM

## 2018-04-25 DIAGNOSIS — E55.9 VITAMIN D DEFICIENCY: ICD-10-CM

## 2018-04-25 DIAGNOSIS — Z12.11 SCREEN FOR COLON CANCER: ICD-10-CM

## 2018-04-25 DIAGNOSIS — Z11.59 NEED FOR HEPATITIS C SCREENING TEST: ICD-10-CM

## 2018-04-25 LAB
ALBUMIN SERPL BCP-MCNC: 3.7 G/DL (ref 3.5–5)
ALP SERPL-CCNC: 100 U/L (ref 46–116)
ALT SERPL W P-5'-P-CCNC: 55 U/L (ref 12–78)
ANION GAP SERPL CALCULATED.3IONS-SCNC: 6 MMOL/L (ref 4–13)
AST SERPL W P-5'-P-CCNC: 46 U/L (ref 5–45)
BILIRUB SERPL-MCNC: 0.43 MG/DL (ref 0.2–1)
BUN SERPL-MCNC: 12 MG/DL (ref 5–25)
CALCIUM SERPL-MCNC: 9.4 MG/DL (ref 8.3–10.1)
CHLORIDE SERPL-SCNC: 105 MMOL/L (ref 100–108)
CO2 SERPL-SCNC: 27 MMOL/L (ref 21–32)
CREAT SERPL-MCNC: 0.62 MG/DL (ref 0.6–1.3)
GFR SERPL CREATININE-BSD FRML MDRD: 90 ML/MIN/1.73SQ M
GLUCOSE P FAST SERPL-MCNC: 95 MG/DL (ref 65–99)
HEMOCCULT STL QL IA: NEGATIVE
LDLC SERPL DIRECT ASSAY-MCNC: 131 MG/DL (ref 0–100)
POTASSIUM SERPL-SCNC: 4.2 MMOL/L (ref 3.5–5.3)
PROT SERPL-MCNC: 7.7 G/DL (ref 6.4–8.2)
SODIUM SERPL-SCNC: 138 MMOL/L (ref 136–145)
TRIGL SERPL-MCNC: 81 MG/DL

## 2018-04-25 PROCEDURE — 86803 HEPATITIS C AB TEST: CPT

## 2018-04-25 PROCEDURE — 36415 COLL VENOUS BLD VENIPUNCTURE: CPT

## 2018-04-25 PROCEDURE — G0328 FECAL BLOOD SCRN IMMUNOASSAY: HCPCS

## 2018-04-25 PROCEDURE — 80053 COMPREHEN METABOLIC PANEL: CPT

## 2018-04-25 PROCEDURE — 82652 VIT D 1 25-DIHYDROXY: CPT

## 2018-04-25 PROCEDURE — 84478 ASSAY OF TRIGLYCERIDES: CPT

## 2018-04-25 PROCEDURE — 83721 ASSAY OF BLOOD LIPOPROTEIN: CPT

## 2018-04-26 LAB — HCV AB SER QL: NORMAL

## 2018-04-27 LAB — 1,25(OH)2D3 SERPL-MCNC: 38.4 PG/ML (ref 19.9–79.3)

## 2018-07-23 ENCOUNTER — CONSULT (OUTPATIENT)
Dept: INTERNAL MEDICINE CLINIC | Facility: CLINIC | Age: 73
End: 2018-07-23
Payer: COMMERCIAL

## 2018-07-23 VITALS
WEIGHT: 173 LBS | HEART RATE: 76 BPM | BODY MASS INDEX: 34.88 KG/M2 | HEIGHT: 59 IN | RESPIRATION RATE: 16 BRPM | TEMPERATURE: 97.6 F | SYSTOLIC BLOOD PRESSURE: 126 MMHG | DIASTOLIC BLOOD PRESSURE: 80 MMHG

## 2018-07-23 DIAGNOSIS — H25.9 AGE-RELATED CATARACT OF BOTH EYES, UNSPECIFIED AGE-RELATED CATARACT TYPE: ICD-10-CM

## 2018-07-23 DIAGNOSIS — Z01.818 VISIT FOR PRE-OPERATIVE EXAMINATION: Primary | ICD-10-CM

## 2018-07-23 PROCEDURE — 99214 OFFICE O/P EST MOD 30 MIN: CPT | Performed by: INTERNAL MEDICINE

## 2018-07-23 NOTE — PROGRESS NOTES
Assessment/Plan:    No problem-specific Assessment & Plan notes found for this encounter  Diagnoses and all orders for this visit:    Visit for pre-operative examination    Age-related cataract of both eyes, unspecified age-related cataract type      A/P: Pt and co-morbidities are stable/controlled  No PAT testing  Nguyen's Class I with cardiac risk of about 1%  Would hold any NSAID, ASA, omega 3, MVT for one week  No other recommendations at this time  Thanks  Subjective:      Patient ID: Trevon Hsieh is a 67 y o  female  HF presents at the request of Dr Susan Carpenter for pre-op eval for OU cataract extraction tentatively for 7/26 and starting with the OS  Since last visit, doing well and remains active w/o difficulty and no falls reported  No illnesses  No fever or chills or sweats  Denies CP, SOB, or palpitations  No sz or syncope  No changes in bowel or bladder issues  No orthopnea or PND  No PAT testing requested  PMH includes chronic LBP and shoulder pain, GERD, and hyperlipidemia  PSH includes dental sx, tonsils, and tubal  No problems with prior sx or anesthesia  No dentures or neck issues  Allergies to flu vaccine/lasix and prednisone  Uses NSAID's PRN, but no blood thinners otherwise  No h/o bleeding or clotting issues  Former smoker and rare ETOH use  No illicit drug use  No prior blood transfusions and is not against them if needed  The following portions of the patient's history were reviewed and updated as appropriate:   She  has a past medical history of Abnormal electrocardiogram; Chronic kidney disease; and Kidney stone    She   Patient Active Problem List    Diagnosis Date Noted    Chronic lower back pain 07/26/2017    Chronic right shoulder pain 05/03/2017    Sciatica of left side 01/27/2016    GERD without esophagitis 01/08/2016    Hyperlipidemia 01/08/2016    Nephrolithiasis 01/08/2016    Vitamin D deficiency 01/08/2016     She  has a past surgical history that includes Tubal ligation; Tonsillectomy; Tooth extraction; and Eye surgery  Her family history includes Aneurysm in her father; Cirrhosis in her brother; Hypertension in her sister; Nephrolithiasis in her father; Other in her mother  She  reports that she has quit smoking  She has never used smokeless tobacco  She reports that she drinks alcohol  She reports that she does not use drugs  Current Outpatient Prescriptions   Medication Sig Dispense Refill    diclofenac (VOLTAREN) 75 mg EC tablet Take 1 tablet (75 mg total) by mouth 2 (two) times a day 30 tablet 5    naproxen sodium (ALEVE) 220 MG tablet Take by mouth      pantoprazole (PROTONIX) 20 mg tablet Take 1 tablet by mouth daily       No current facility-administered medications for this visit  Current Outpatient Prescriptions on File Prior to Visit   Medication Sig    diclofenac (VOLTAREN) 75 mg EC tablet Take 1 tablet (75 mg total) by mouth 2 (two) times a day    naproxen sodium (ALEVE) 220 MG tablet Take by mouth    pantoprazole (PROTONIX) 20 mg tablet Take 1 tablet by mouth daily     No current facility-administered medications on file prior to visit  She is allergic to flu virus vaccine; furosemide; and prednisone       Review of Systems   Constitutional: Negative for activity change, chills, diaphoresis, fatigue and fever  HENT: Negative for congestion, ear discharge, ear pain, postnasal drip, rhinorrhea, sinus pain, sinus pressure, sore throat and trouble swallowing  Eyes: Positive for visual disturbance  Respiratory: Negative for cough, chest tightness, shortness of breath and wheezing  Cardiovascular: Negative for chest pain, palpitations and leg swelling  Gastrointestinal: Negative for abdominal pain, constipation, diarrhea, nausea and vomiting  Endocrine: Negative for cold intolerance and heat intolerance  Genitourinary: Negative for difficulty urinating, dysuria and frequency     Musculoskeletal: Positive for back pain  Negative for arthralgias, gait problem and myalgias  Neurological: Negative for dizziness, tremors, seizures, syncope, weakness, light-headedness, numbness and headaches  Psychiatric/Behavioral: Negative for confusion and dysphoric mood  The patient is not nervous/anxious  Objective:      /80   Pulse 76   Temp 97 6 °F (36 4 °C) (Tympanic)   Resp 16   Ht 4' 11" (1 499 m)   Wt 78 5 kg (173 lb)   BMI 34 94 kg/m²          Physical Exam   Constitutional: She is oriented to person, place, and time  She appears well-developed and well-nourished  No distress  HENT:   Head: Normocephalic and atraumatic  Mouth/Throat: Oropharynx is clear and moist  No oropharyngeal exudate  No dentures or oropharyngeal obstructions  Eyes: Conjunctivae and EOM are normal  Pupils are equal, round, and reactive to light  Neck: Neck supple  No JVD present  No tracheal deviation present  No thyromegaly present  C spine ROM wnl  Cardiovascular: Normal rate, regular rhythm and normal heart sounds  No murmur heard  Pulmonary/Chest: Effort normal and breath sounds normal  No respiratory distress  She has no wheezes  Abdominal: Soft  Bowel sounds are normal  She exhibits no distension  There is no tenderness  Musculoskeletal: She exhibits no edema, tenderness or deformity  Lymphadenopathy:     She has no cervical adenopathy  Neurological: She is alert and oriented to person, place, and time  No cranial nerve deficit  Coordination normal    Psychiatric: She has a normal mood and affect  Her behavior is normal  Judgment and thought content normal    Nursing note and vitals reviewed

## 2018-11-19 ENCOUNTER — OFFICE VISIT (OUTPATIENT)
Dept: INTERNAL MEDICINE CLINIC | Facility: CLINIC | Age: 73
End: 2018-11-19
Payer: COMMERCIAL

## 2018-11-19 ENCOUNTER — APPOINTMENT (OUTPATIENT)
Dept: LAB | Facility: CLINIC | Age: 73
End: 2018-11-19
Payer: COMMERCIAL

## 2018-11-19 VITALS
WEIGHT: 170 LBS | RESPIRATION RATE: 16 BRPM | BODY MASS INDEX: 34.27 KG/M2 | HEART RATE: 80 BPM | SYSTOLIC BLOOD PRESSURE: 126 MMHG | HEIGHT: 59 IN | DIASTOLIC BLOOD PRESSURE: 72 MMHG | TEMPERATURE: 98.6 F

## 2018-11-19 DIAGNOSIS — Z13.1 SCREENING FOR DIABETES MELLITUS (DM): ICD-10-CM

## 2018-11-19 DIAGNOSIS — K21.9 GERD WITHOUT ESOPHAGITIS: ICD-10-CM

## 2018-11-19 DIAGNOSIS — E78.2 MIXED HYPERLIPIDEMIA: Primary | ICD-10-CM

## 2018-11-19 DIAGNOSIS — E78.2 MIXED HYPERLIPIDEMIA: ICD-10-CM

## 2018-11-19 DIAGNOSIS — M54.5 CHRONIC LOW BACK PAIN, UNSPECIFIED BACK PAIN LATERALITY, WITH SCIATICA PRESENCE UNSPECIFIED: ICD-10-CM

## 2018-11-19 DIAGNOSIS — E55.9 VITAMIN D DEFICIENCY: ICD-10-CM

## 2018-11-19 DIAGNOSIS — G89.29 CHRONIC LOW BACK PAIN, UNSPECIFIED BACK PAIN LATERALITY, WITH SCIATICA PRESENCE UNSPECIFIED: ICD-10-CM

## 2018-11-19 LAB
ALBUMIN SERPL BCP-MCNC: 3.7 G/DL (ref 3.5–5)
ALP SERPL-CCNC: 90 U/L (ref 46–116)
ALT SERPL W P-5'-P-CCNC: 42 U/L (ref 12–78)
ANION GAP SERPL CALCULATED.3IONS-SCNC: 6 MMOL/L (ref 4–13)
AST SERPL W P-5'-P-CCNC: 40 U/L (ref 5–45)
BASOPHILS # BLD AUTO: 0.06 THOUSANDS/ΜL (ref 0–0.1)
BASOPHILS NFR BLD AUTO: 1 % (ref 0–1)
BILIRUB SERPL-MCNC: 0.49 MG/DL (ref 0.2–1)
BUN SERPL-MCNC: 15 MG/DL (ref 5–25)
CALCIUM SERPL-MCNC: 9.1 MG/DL (ref 8.3–10.1)
CHLORIDE SERPL-SCNC: 105 MMOL/L (ref 100–108)
CHOLEST SERPL-MCNC: 218 MG/DL (ref 50–200)
CO2 SERPL-SCNC: 28 MMOL/L (ref 21–32)
CREAT SERPL-MCNC: 0.69 MG/DL (ref 0.6–1.3)
EOSINOPHIL # BLD AUTO: 0.57 THOUSAND/ΜL (ref 0–0.61)
EOSINOPHIL NFR BLD AUTO: 11 % (ref 0–6)
ERYTHROCYTE [DISTWIDTH] IN BLOOD BY AUTOMATED COUNT: 13.9 % (ref 11.6–15.1)
GFR SERPL CREATININE-BSD FRML MDRD: 87 ML/MIN/1.73SQ M
GLUCOSE P FAST SERPL-MCNC: 107 MG/DL (ref 65–99)
HCT VFR BLD AUTO: 41 % (ref 34.8–46.1)
HDLC SERPL-MCNC: 75 MG/DL (ref 40–60)
HGB BLD-MCNC: 12.8 G/DL (ref 11.5–15.4)
IMM GRANULOCYTES # BLD AUTO: 0.01 THOUSAND/UL (ref 0–0.2)
IMM GRANULOCYTES NFR BLD AUTO: 0 % (ref 0–2)
LDLC SERPL CALC-MCNC: 130 MG/DL (ref 0–100)
LYMPHOCYTES # BLD AUTO: 1.57 THOUSANDS/ΜL (ref 0.6–4.47)
LYMPHOCYTES NFR BLD AUTO: 30 % (ref 14–44)
MCH RBC QN AUTO: 28.3 PG (ref 26.8–34.3)
MCHC RBC AUTO-ENTMCNC: 31.2 G/DL (ref 31.4–37.4)
MCV RBC AUTO: 91 FL (ref 82–98)
MONOCYTES # BLD AUTO: 0.41 THOUSAND/ΜL (ref 0.17–1.22)
MONOCYTES NFR BLD AUTO: 8 % (ref 4–12)
NEUTROPHILS # BLD AUTO: 2.58 THOUSANDS/ΜL (ref 1.85–7.62)
NEUTS SEG NFR BLD AUTO: 50 % (ref 43–75)
NRBC BLD AUTO-RTO: 0 /100 WBCS
PLATELET # BLD AUTO: 210 THOUSANDS/UL (ref 149–390)
PMV BLD AUTO: 11.8 FL (ref 8.9–12.7)
POTASSIUM SERPL-SCNC: 4.4 MMOL/L (ref 3.5–5.3)
PROT SERPL-MCNC: 7.8 G/DL (ref 6.4–8.2)
RBC # BLD AUTO: 4.52 MILLION/UL (ref 3.81–5.12)
SODIUM SERPL-SCNC: 139 MMOL/L (ref 136–145)
TRIGL SERPL-MCNC: 67 MG/DL
TSH SERPL DL<=0.05 MIU/L-ACNC: 1.38 UIU/ML (ref 0.36–3.74)
WBC # BLD AUTO: 5.2 THOUSAND/UL (ref 4.31–10.16)

## 2018-11-19 PROCEDURE — 1036F TOBACCO NON-USER: CPT | Performed by: INTERNAL MEDICINE

## 2018-11-19 PROCEDURE — 85025 COMPLETE CBC W/AUTO DIFF WBC: CPT

## 2018-11-19 PROCEDURE — 83036 HEMOGLOBIN GLYCOSYLATED A1C: CPT

## 2018-11-19 PROCEDURE — 1160F RVW MEDS BY RX/DR IN RCRD: CPT | Performed by: INTERNAL MEDICINE

## 2018-11-19 PROCEDURE — 36415 COLL VENOUS BLD VENIPUNCTURE: CPT

## 2018-11-19 PROCEDURE — 80061 LIPID PANEL: CPT

## 2018-11-19 PROCEDURE — 99214 OFFICE O/P EST MOD 30 MIN: CPT | Performed by: INTERNAL MEDICINE

## 2018-11-19 PROCEDURE — 80053 COMPREHEN METABOLIC PANEL: CPT

## 2018-11-19 PROCEDURE — 84443 ASSAY THYROID STIM HORMONE: CPT

## 2018-11-19 PROCEDURE — 3008F BODY MASS INDEX DOCD: CPT | Performed by: INTERNAL MEDICINE

## 2018-11-19 NOTE — PATIENT INSTRUCTIONS

## 2018-11-19 NOTE — PROGRESS NOTES
Assessment/Plan:    No problem-specific Assessment & Plan notes found for this encounter  Diagnoses and all orders for this visit:    Mixed hyperlipidemia  -     Comprehensive metabolic panel; Future  -     Lipid Panel with Direct LDL reflex; Future  -     TSH, 3rd generation with Free T4 reflex; Future    GERD without esophagitis  -     CBC and differential; Future    Chronic low back pain, unspecified back pain laterality, with sciatica presence unspecified    Vitamin D deficiency    Screening for diabetes mellitus (DM)  -     Hemoglobin A1C; Future      A/P: Doing well and will check labs  Refusing all vaccines  Will continue current treatment and RTC six months for routine  Subjective:      Patient ID: Petra Krishnamurthy is a 68 y o  female  HF RTC for f/u hyperlipidemia, GERD, etc  Doing well and no new issues  Remains active w/o difficulty and no falls  Chronic LBP and shoulder pain no worse  No reflux  Due for labs and vaccines  The following portions of the patient's history were reviewed and updated as appropriate:   She  has a past medical history of Abnormal electrocardiogram; Chronic kidney disease; and Kidney stone  She   Patient Active Problem List    Diagnosis Date Noted    Chronic lower back pain 07/26/2017    Chronic right shoulder pain 05/03/2017    Sciatica of left side 01/27/2016    GERD without esophagitis 01/08/2016    Hyperlipidemia 01/08/2016    Nephrolithiasis 01/08/2016    Vitamin D deficiency 01/08/2016     She  has a past surgical history that includes Tubal ligation; Tonsillectomy; Tooth extraction; and Eye surgery  Her family history includes Aneurysm in her father; Cirrhosis in her brother; Hypertension in her sister; Nephrolithiasis in her father; Other in her mother  She  reports that she has quit smoking  She has never used smokeless tobacco  She reports that she drinks alcohol  She reports that she does not use drugs    Current Outpatient Prescriptions Medication Sig Dispense Refill    naproxen sodium (ALEVE) 220 MG tablet Take by mouth      pantoprazole (PROTONIX) 20 mg tablet Take 1 tablet by mouth daily       No current facility-administered medications for this visit  Current Outpatient Prescriptions on File Prior to Visit   Medication Sig    naproxen sodium (ALEVE) 220 MG tablet Take by mouth    pantoprazole (PROTONIX) 20 mg tablet Take 1 tablet by mouth daily    [DISCONTINUED] diclofenac (VOLTAREN) 75 mg EC tablet Take 1 tablet (75 mg total) by mouth 2 (two) times a day (Patient not taking: Reported on 11/19/2018 )     No current facility-administered medications on file prior to visit  She is allergic to flu virus vaccine; furosemide; and prednisone       Review of Systems   Constitutional: Negative for activity change, chills, diaphoresis, fatigue and fever  HENT: Negative  Eyes: Negative for visual disturbance  Respiratory: Negative for cough, chest tightness, shortness of breath and wheezing  Cardiovascular: Negative for chest pain, palpitations and leg swelling  Gastrointestinal: Negative for abdominal pain, constipation, diarrhea, nausea and vomiting  Endocrine: Negative for cold intolerance and heat intolerance  Genitourinary: Negative for difficulty urinating, dysuria and frequency  Musculoskeletal: Positive for arthralgias and back pain  Negative for gait problem and myalgias  Neurological: Negative for dizziness, seizures, syncope, weakness, light-headedness and headaches  Psychiatric/Behavioral: Negative for confusion and dysphoric mood  The patient is not nervous/anxious  Objective:      /72   Pulse 80   Temp 98 6 °F (37 °C) (Tympanic)   Resp 16   Ht 4' 11" (1 499 m)   Wt 77 1 kg (170 lb)   BMI 34 34 kg/m²          Physical Exam   Constitutional: She is oriented to person, place, and time  She appears well-developed and well-nourished  No distress     HENT:   Head: Normocephalic and atraumatic  Mouth/Throat: Oropharynx is clear and moist    Eyes: Pupils are equal, round, and reactive to light  Conjunctivae and EOM are normal    Neck: Neck supple  No JVD present  Cardiovascular: Normal rate, regular rhythm and normal heart sounds  No murmur heard  Pulmonary/Chest: Effort normal and breath sounds normal  No respiratory distress  She has no wheezes  Abdominal: Soft  Bowel sounds are normal  She exhibits no distension  There is no tenderness  Musculoskeletal: She exhibits no edema  Neurological: She is alert and oriented to person, place, and time  Psychiatric: She has a normal mood and affect  Her behavior is normal  Judgment and thought content normal    Nursing note and vitals reviewed

## 2018-11-20 LAB
EST. AVERAGE GLUCOSE BLD GHB EST-MCNC: 128 MG/DL
HBA1C MFR BLD: 6.1 % (ref 4.2–6.3)

## 2019-05-20 ENCOUNTER — OFFICE VISIT (OUTPATIENT)
Dept: INTERNAL MEDICINE CLINIC | Facility: CLINIC | Age: 74
End: 2019-05-20
Payer: COMMERCIAL

## 2019-05-20 VITALS
HEART RATE: 85 BPM | SYSTOLIC BLOOD PRESSURE: 116 MMHG | DIASTOLIC BLOOD PRESSURE: 70 MMHG | HEIGHT: 59 IN | BODY MASS INDEX: 35.24 KG/M2 | OXYGEN SATURATION: 97 % | WEIGHT: 174.8 LBS | RESPIRATION RATE: 18 BRPM | TEMPERATURE: 99.7 F

## 2019-05-20 DIAGNOSIS — E55.9 VITAMIN D DEFICIENCY: ICD-10-CM

## 2019-05-20 DIAGNOSIS — Z00.00 MEDICARE ANNUAL WELLNESS VISIT, INITIAL: ICD-10-CM

## 2019-05-20 DIAGNOSIS — K21.9 GERD WITHOUT ESOPHAGITIS: ICD-10-CM

## 2019-05-20 DIAGNOSIS — M54.5 CHRONIC LOW BACK PAIN, UNSPECIFIED BACK PAIN LATERALITY, WITH SCIATICA PRESENCE UNSPECIFIED: ICD-10-CM

## 2019-05-20 DIAGNOSIS — E66.9 OBESITY (BMI 35.0-39.9 WITHOUT COMORBIDITY): ICD-10-CM

## 2019-05-20 DIAGNOSIS — Z12.39 SCREENING FOR BREAST CANCER: ICD-10-CM

## 2019-05-20 DIAGNOSIS — E78.2 MIXED HYPERLIPIDEMIA: Primary | ICD-10-CM

## 2019-05-20 DIAGNOSIS — G89.29 CHRONIC LOW BACK PAIN, UNSPECIFIED BACK PAIN LATERALITY, WITH SCIATICA PRESENCE UNSPECIFIED: ICD-10-CM

## 2019-05-20 PROCEDURE — 3008F BODY MASS INDEX DOCD: CPT | Performed by: INTERNAL MEDICINE

## 2019-05-20 PROCEDURE — 99214 OFFICE O/P EST MOD 30 MIN: CPT | Performed by: INTERNAL MEDICINE

## 2019-05-20 PROCEDURE — 1125F AMNT PAIN NOTED PAIN PRSNT: CPT | Performed by: INTERNAL MEDICINE

## 2019-05-20 PROCEDURE — 1170F FXNL STATUS ASSESSED: CPT | Performed by: INTERNAL MEDICINE

## 2019-05-20 PROCEDURE — 1160F RVW MEDS BY RX/DR IN RCRD: CPT | Performed by: INTERNAL MEDICINE

## 2019-05-21 ENCOUNTER — APPOINTMENT (OUTPATIENT)
Dept: LAB | Facility: CLINIC | Age: 74
End: 2019-05-21
Payer: COMMERCIAL

## 2019-05-21 DIAGNOSIS — E78.2 MIXED HYPERLIPIDEMIA: ICD-10-CM

## 2019-05-21 LAB
ALBUMIN SERPL BCP-MCNC: 3.7 G/DL (ref 3.5–5)
ALP SERPL-CCNC: 94 U/L (ref 46–116)
ALT SERPL W P-5'-P-CCNC: 37 U/L (ref 12–78)
ANION GAP SERPL CALCULATED.3IONS-SCNC: 6 MMOL/L (ref 4–13)
AST SERPL W P-5'-P-CCNC: 34 U/L (ref 5–45)
BILIRUB SERPL-MCNC: 0.45 MG/DL (ref 0.2–1)
BUN SERPL-MCNC: 13 MG/DL (ref 5–25)
CALCIUM SERPL-MCNC: 8.9 MG/DL (ref 8.3–10.1)
CHLORIDE SERPL-SCNC: 107 MMOL/L (ref 100–108)
CO2 SERPL-SCNC: 28 MMOL/L (ref 21–32)
CREAT SERPL-MCNC: 0.63 MG/DL (ref 0.6–1.3)
GFR SERPL CREATININE-BSD FRML MDRD: 89 ML/MIN/1.73SQ M
GLUCOSE P FAST SERPL-MCNC: 101 MG/DL (ref 65–99)
LDLC SERPL DIRECT ASSAY-MCNC: 112 MG/DL (ref 0–100)
POTASSIUM SERPL-SCNC: 4.4 MMOL/L (ref 3.5–5.3)
PROT SERPL-MCNC: 7.2 G/DL (ref 6.4–8.2)
SODIUM SERPL-SCNC: 141 MMOL/L (ref 136–145)
TRIGL SERPL-MCNC: 103 MG/DL

## 2019-05-21 PROCEDURE — 83721 ASSAY OF BLOOD LIPOPROTEIN: CPT

## 2019-05-21 PROCEDURE — 84478 ASSAY OF TRIGLYCERIDES: CPT

## 2019-05-21 PROCEDURE — 80053 COMPREHEN METABOLIC PANEL: CPT

## 2019-05-21 PROCEDURE — 36415 COLL VENOUS BLD VENIPUNCTURE: CPT

## 2019-06-13 ENCOUNTER — HOSPITAL ENCOUNTER (OUTPATIENT)
Dept: MAMMOGRAPHY | Facility: HOSPITAL | Age: 74
Discharge: HOME | End: 2019-06-13
Payer: COMMERCIAL

## 2019-06-13 VITALS — HEIGHT: 60 IN | BODY MASS INDEX: 34.16 KG/M2 | WEIGHT: 174 LBS

## 2019-06-13 DIAGNOSIS — Z12.39 SCREENING FOR BREAST CANCER: ICD-10-CM

## 2019-06-13 PROCEDURE — 77063 BREAST TOMOSYNTHESIS BI: CPT

## 2019-06-13 PROCEDURE — 77067 SCR MAMMO BI INCL CAD: CPT

## 2019-07-02 ENCOUNTER — HOSPITAL ENCOUNTER (OUTPATIENT)
Dept: ULTRASOUND IMAGING | Facility: HOSPITAL | Age: 74
Discharge: HOME | End: 2019-07-02

## 2019-07-02 ENCOUNTER — HOSPITAL ENCOUNTER (OUTPATIENT)
Dept: MAMMOGRAPHY | Facility: HOSPITAL | Age: 74
Discharge: HOME | End: 2019-07-02
Payer: COMMERCIAL

## 2019-07-02 DIAGNOSIS — R92.8 ABNORMAL MAMMOGRAM: ICD-10-CM

## 2019-07-02 PROCEDURE — 77065 DX MAMMO INCL CAD UNI: CPT

## 2019-07-02 PROCEDURE — G0279 TOMOSYNTHESIS, MAMMO: HCPCS

## 2019-07-22 ENCOUNTER — HOSPITAL ENCOUNTER (EMERGENCY)
Facility: HOSPITAL | Age: 74
Discharge: HOME | End: 2019-07-22
Attending: EMERGENCY MEDICINE | Admitting: EMERGENCY MEDICINE
Payer: COMMERCIAL

## 2019-07-22 ENCOUNTER — APPOINTMENT (EMERGENCY)
Dept: RADIOLOGY | Facility: HOSPITAL | Age: 74
End: 2019-07-22
Payer: COMMERCIAL

## 2019-07-22 VITALS
TEMPERATURE: 98.4 F | HEART RATE: 78 BPM | DIASTOLIC BLOOD PRESSURE: 72 MMHG | RESPIRATION RATE: 20 BRPM | HEIGHT: 60 IN | BODY MASS INDEX: 34.15 KG/M2 | WEIGHT: 173.94 LBS | OXYGEN SATURATION: 96 % | SYSTOLIC BLOOD PRESSURE: 130 MMHG

## 2019-07-22 DIAGNOSIS — M79.645 FINGER PAIN, LEFT: ICD-10-CM

## 2019-07-22 DIAGNOSIS — M79.641 HAND PAIN, RIGHT: Primary | ICD-10-CM

## 2019-07-22 PROCEDURE — 73140 X-RAY EXAM OF FINGER(S): CPT

## 2019-07-22 PROCEDURE — 99283 EMERGENCY DEPT VISIT LOW MDM: CPT

## 2019-07-22 RX ORDER — IBUPROFEN 600 MG/1
600 TABLET ORAL EVERY 6 HOURS PRN
Qty: 30 TABLET | Refills: 0 | Status: SHIPPED | OUTPATIENT
Start: 2019-07-22 | End: 2019-11-07 | Stop reason: SDUPTHER

## 2019-07-22 NOTE — ED PROVIDER NOTES
History  Chief Complaint   Patient presents with    Hand Pain     both hands worse since last night     68YEAR-OLD FEMALE DOMINANT RIGHT HAND PATIENT WITH A HISTORY OF CHRONIC RENAL DISEASE PRESENTS TO THE EMERGENCY DEPARTMENT WITH LEFT RING FINGER PAIN AND SWELLING FOR THE PAST 2 WEEKS THAT IS GONE INCREASINGLY WORSE OVER THE PAST 8 HOURS  SHE HAS ALSO HAD RIGHT THUMB PAIN FOR MORE THAN A YEAR WHICH IS SOMEWHAT SIMILAR IN THE TYPE OF PAIN THAT SHE IS HAVING  SHE REPORTS HAVING FRACTURE OF THE RIGHT THUMB SOME YEARS AGO  HOWEVER SHE REPORTS NO TRAUMA IN THE CASE OF THE LEFT RING FINGER  PAIN HAS BEEN WORSE WITH ACTIVITIES SHE REPORTS NO UNUSUAL ACTIVITY OR REPETITIVE TYPES OF ACTIVITY  NO REPORTED NUMBNESS OR TINGLING  Prior to Admission Medications   Prescriptions Last Dose Informant Patient Reported? Taking?   naproxen sodium (ALEVE) 220 MG tablet   Yes No   Sig: Take by mouth   pantoprazole (PROTONIX) 20 mg tablet   Yes No   Sig: Take 1 tablet by mouth daily      Facility-Administered Medications: None       Past Medical History:   Diagnosis Date    Abnormal electrocardiogram     last assessed 1/8/16    Chronic kidney disease     GERD (gastroesophageal reflux disease)     Kidney stone        Past Surgical History:   Procedure Laterality Date    EYE SURGERY      TONSILLECTOMY      TOOTH EXTRACTION      TUBAL LIGATION         Family History   Problem Relation Age of Onset    Other Mother         sheehans syndrome    Aneurysm Father     Nephrolithiasis Father     Hypertension Sister     Cirrhosis Brother         alcoholic liver    No Known Problems Daughter     No Known Problems Sister     No Known Problems Daughter     No Known Problems Daughter     No Known Problems Daughter     No Known Problems Maternal Aunt      I have reviewed and agree with the history as documented      Social History     Tobacco Use    Smoking status: Former Smoker    Smokeless tobacco: Never Used Substance Use Topics    Alcohol use: Yes     Comment: occasional    Drug use: No        Review of Systems   Constitutional: Negative for chills and fever  HENT: Negative for ear pain, rhinorrhea and sore throat  Eyes: Negative for pain, redness and visual disturbance  Respiratory: Negative for cough and shortness of breath  Cardiovascular: Negative for chest pain and leg swelling  Gastrointestinal: Positive for abdominal pain and nausea  Negative for diarrhea and vomiting  Genitourinary: Positive for flank pain and frequency  Negative for dysuria and urgency  Musculoskeletal: Negative for back pain, myalgias and neck pain  Skin: Negative for rash  Neurological: Negative for dizziness, weakness, light-headedness and headaches  Hematological: Negative  Psychiatric/Behavioral: Negative for agitation, confusion and suicidal ideas  The patient is not nervous/anxious  All other systems reviewed and are negative  Physical Exam  Physical Exam   Constitutional: She is oriented to person, place, and time  She appears well-developed and well-nourished  HENT:   Nose: Nose normal    Mouth/Throat: Oropharynx is clear and moist  No oropharyngeal exudate  Eyes: Pupils are equal, round, and reactive to light  Conjunctivae and EOM are normal  No scleral icterus  Neck: Normal range of motion  Neck supple  No JVD present  No tracheal deviation present  Cardiovascular: Normal rate, regular rhythm and normal heart sounds  No murmur heard  Pulmonary/Chest: Effort normal and breath sounds normal  No respiratory distress  She has no wheezes  She has no rales  Abdominal: Soft  Bowel sounds are normal  There is no tenderness  There is no guarding  Musculoskeletal: She exhibits no edema or tenderness  THE LEFT RING FINGER IS EDEMATOUS PARTICULARLY ABOUT THE PIP JOINT WITH ASSOCIATED TENDERNESS AT THAT ARTICULATION  THERE IS DECREASED RANGE OF MOTION  NO BONY DEFECTS ARE NOTED    THERE ARE NO OVERLYING DERMAL CHANGES  NO EVIDENCE OF TRAUMA  THE RIGHT THUMB IS MINIMALLY TENDER OVER THE MCP JOINT  NO DEFORMITY OF THE RIGHT THUMB  THERE IS FULL RANGE OF MOTION  NO EDEMA OR OVERLYING DERMAL CHANGES  Neurological: She is alert and oriented to person, place, and time  No cranial nerve deficit or sensory deficit  She exhibits normal muscle tone  5/5 motor, nl sens   Skin: Skin is warm and dry  Psychiatric: She has a normal mood and affect  Her behavior is normal    Nursing note and vitals reviewed  Vital Signs  ED Triage Vitals   Temp Pulse Resp BP SpO2   -- -- -- -- --      Temp src Heart Rate Source Patient Position - Orthostatic VS BP Location FiO2 (%)   -- -- -- -- --      Pain Score       --           There were no vitals filed for this visit  Visual Acuity      ED Medications  Medications - No data to display    Diagnostic Studies  Results Reviewed     None                 No orders to display              Procedures  Procedures       ED Course                               MDM    Disposition  Final diagnoses:   None     ED Disposition     None      Follow-up Information    None         Patient's Medications   Discharge Prescriptions    No medications on file     No discharge procedures on file      ED Provider  Electronically Signed by           Gen Shepherd MD  07/22/19 1600

## 2019-07-23 ENCOUNTER — TELEPHONE (OUTPATIENT)
Dept: EMERGENCY DEPT | Facility: HOSPITAL | Age: 74
End: 2019-07-23

## 2019-07-23 NOTE — RESULT ENCOUNTER NOTE
Patient called and advised of significant arthritis on x-ray, advise follow-up with PCP outpatient  Patient verbalized understanding

## 2019-09-17 ENCOUNTER — TRANSCRIBE ORDERS (OUTPATIENT)
Dept: LAB | Facility: CLINIC | Age: 74
End: 2019-09-17

## 2019-09-17 ENCOUNTER — APPOINTMENT (OUTPATIENT)
Dept: LAB | Facility: CLINIC | Age: 74
End: 2019-09-17
Payer: COMMERCIAL

## 2019-09-17 DIAGNOSIS — M25.541 ARTHRALGIA OF RIGHT HAND: ICD-10-CM

## 2019-09-17 DIAGNOSIS — M25.541 ARTHRALGIA OF RIGHT HAND: Primary | ICD-10-CM

## 2019-09-17 LAB
CRP SERPL QL: 3.3 MG/L
ERYTHROCYTE [SEDIMENTATION RATE] IN BLOOD: 26 MM/HOUR (ref 0–20)

## 2019-09-17 PROCEDURE — 85652 RBC SED RATE AUTOMATED: CPT

## 2019-09-17 PROCEDURE — 36415 COLL VENOUS BLD VENIPUNCTURE: CPT

## 2019-09-17 PROCEDURE — 86200 CCP ANTIBODY: CPT

## 2019-09-17 PROCEDURE — 86430 RHEUMATOID FACTOR TEST QUAL: CPT

## 2019-09-17 PROCEDURE — 86140 C-REACTIVE PROTEIN: CPT

## 2019-09-18 LAB — RHEUMATOID FACT SER QL LA: NEGATIVE

## 2019-09-19 LAB — CCP IGA+IGG SERPL IA-ACNC: 4 UNITS (ref 0–19)

## 2019-11-07 ENCOUNTER — OFFICE VISIT (OUTPATIENT)
Dept: INTERNAL MEDICINE CLINIC | Facility: CLINIC | Age: 74
End: 2019-11-07
Payer: COMMERCIAL

## 2019-11-07 VITALS
SYSTOLIC BLOOD PRESSURE: 110 MMHG | HEIGHT: 60 IN | DIASTOLIC BLOOD PRESSURE: 62 MMHG | WEIGHT: 165 LBS | HEART RATE: 70 BPM | BODY MASS INDEX: 32.39 KG/M2 | RESPIRATION RATE: 14 BRPM | TEMPERATURE: 97.6 F

## 2019-11-07 DIAGNOSIS — Z13.0 SCREENING FOR DEFICIENCY ANEMIA: ICD-10-CM

## 2019-11-07 DIAGNOSIS — E78.2 MIXED HYPERLIPIDEMIA: Primary | ICD-10-CM

## 2019-11-07 DIAGNOSIS — Z13.31 NEGATIVE DEPRESSION SCREENING: ICD-10-CM

## 2019-11-07 DIAGNOSIS — M15.9 PRIMARY OSTEOARTHRITIS INVOLVING MULTIPLE JOINTS: ICD-10-CM

## 2019-11-07 DIAGNOSIS — K21.9 GERD WITHOUT ESOPHAGITIS: ICD-10-CM

## 2019-11-07 DIAGNOSIS — E55.9 VITAMIN D DEFICIENCY: ICD-10-CM

## 2019-11-07 DIAGNOSIS — M79.641 HAND PAIN, RIGHT: ICD-10-CM

## 2019-11-07 DIAGNOSIS — Z00.00 MEDICARE ANNUAL WELLNESS VISIT, INITIAL: ICD-10-CM

## 2019-11-07 PROBLEM — M15.0 PRIMARY OSTEOARTHRITIS INVOLVING MULTIPLE JOINTS: Status: ACTIVE | Noted: 2019-11-07

## 2019-11-07 PROCEDURE — G0438 PPPS, INITIAL VISIT: HCPCS | Performed by: INTERNAL MEDICINE

## 2019-11-07 PROCEDURE — 99214 OFFICE O/P EST MOD 30 MIN: CPT | Performed by: INTERNAL MEDICINE

## 2019-11-07 RX ORDER — CHOLECALCIFEROL (VITAMIN D3) 10(400)/ML
400 DROPS ORAL DAILY
COMMUNITY

## 2019-11-07 RX ORDER — ALFALFA 250 MG
TABLET ORAL
COMMUNITY
End: 2022-05-27

## 2019-11-07 RX ORDER — TRIAMCINOLONE ACETONIDE 1 MG/G
CREAM TOPICAL
COMMUNITY
End: 2022-07-13

## 2019-11-07 RX ORDER — MULTIVITAMIN WITH IRON
TABLET ORAL
COMMUNITY
End: 2022-07-13

## 2019-11-07 RX ORDER — CHLORAL HYDRATE 500 MG
1000 CAPSULE ORAL DAILY
COMMUNITY
End: 2022-07-13

## 2019-11-07 RX ORDER — IBUPROFEN 600 MG/1
600 TABLET ORAL EVERY 6 HOURS PRN
Qty: 60 TABLET | Refills: 3 | Status: SHIPPED | OUTPATIENT
Start: 2019-11-07 | End: 2022-05-27

## 2019-11-07 NOTE — PATIENT INSTRUCTIONS
Hyperlipidemia   WHAT YOU NEED TO KNOW:   What is hyperlipidemia? Hyperlipidemia is a high level of lipids (fats) in your blood  These lipids include cholesterol or triglycerides  Lipids are made by your body  They also come from the foods you eat  Your body needs lipids to work properly, but high levels increase your risk for heart disease, heart attack, and stroke  What increases my risk for hyperlipidemia? · Family history of high lipid levels    · Diet high in saturated fats, cholesterol, or calories     · High alcohol intake or smoking    · Lack of regular physical activity    · Medical conditions such as hypothyroidism, obesity, or type 2 diabetes    · Certain medicines, such as blood pressure medicines, hormones, and steroids  How is hyperlipidemia managed and treated? Your healthcare provider may first recommend that you make lifestyle changes to help decrease your lipid levels  You may also need to take medicine to lower your lipid levels  Some of the lifestyle changes you may need to make include the following:  · Maintain a healthy weight  Ask your healthcare provider how much you should weigh  Ask him or her to help you create a weight loss plan if you are overweight  Weight loss can decrease your cholesterol and triglyceride levels  · Exercise as directed  Exercise lowers your cholesterol levels and helps you maintain a healthy weight  Get 40 minutes or more of moderate exercise 3 to 4 days each week  You can split your exercise into four 10-minute workouts instead of 40 minutes at one time  Examples of moderate exercises include walking briskly, swimming, or riding a bike  Work with your healthcare provider to plan the best exercise program for you  · Do not smoke  Nicotine and other chemicals in cigarettes and cigars can increase your risk for a heart attack and stroke  Ask your healthcare provider for information if you currently smoke and need help to quit   E-cigarettes or smokeless tobacco still contain nicotine  Talk to your healthcare provider before you use these products  · Eat heart-healthy foods  Talk to your dietitian about a heart-healthy diet  The following will help you manage hyperlipidemia:     ¨ Decrease the total amount of fat you eat  Choose lean meats, fat-free or 1% fat milk, and low-fat dairy products, such as yogurt and cheese  Limit or do not eat red meat  Red meats are high in fat and cholesterol  ¨ Replace unhealthy fats with healthy fats  Unhealthy fats include saturated fat, trans fat, and cholesterol  Choose soft margarines that are low in saturated fat and have little or no trans fat  Monounsaturated fats are healthy fats  These are found in olive oil, canola oil, avocado, and nuts  Polyunsaturated fats are also healthy  These are found in fish, flaxseed, walnuts, and soybeans  ¨ Eat fruits and vegetables every day  They are low in calories and fat and a good source of essential vitamins  Include dark green, red, and orange vegetables  Examples include spinach, kale, broccoli, and carrots  ¨ Eat foods high in fiber  Choose whole grain, high-fiber foods  Good choices include whole-wheat breads or cereals, beans, peas, fruits, and vegetables  · Ask your healthcare provider if it is safe for you to drink alcohol  Alcohol can increase your cholesterol and triglyceride levels  A drink of alcohol is 12 ounces of beer, 5 ounces of wine, or 1½ ounces of liquor    Call 911 for any of the following:   · You have any of the following signs of a heart attack:      ¨ Squeezing, pressure, or pain in your chest that lasts longer than 5 minutes or returns    ¨ Discomfort or pain in your back, neck, jaw, stomach, or arm     ¨ Trouble breathing    ¨ Nausea or vomiting    ¨ Lightheadedness or a sudden cold sweat, especially with chest pain or trouble breathing    · You have any of the following signs of a stroke:      ¨ Numbness or drooping on one side of your face     ¨ Weakness in an arm or leg    ¨ Confusion or difficulty speaking    ¨ Dizziness, a severe headache, or vision loss  When should I contact my healthcare provider? · You have questions or concerns about your condition or care  CARE AGREEMENT:   You have the right to help plan your care  Learn about your health condition and how it may be treated  Discuss treatment options with your caregivers to decide what care you want to receive  You always have the right to refuse treatment  The above information is an  only  It is not intended as medical advice for individual conditions or treatments  Talk to your doctor, nurse or pharmacist before following any medical regimen to see if it is safe and effective for you  © 2017 [address]  Information is for End User's use only and may not be sold, redistributed or otherwise used for commercial purposes  All illustrations and images included in CareNotes® are the copyrighted property of [employer] M [employer] Inc  or [employer]  Medicare Preventive Visit Patient Instructions  Thank you for completing your Welcome to Medicare Visit or Medicare Annual Wellness Visit today  Your next wellness visit will be due in one year (11/7/2020)  The screening/preventive services that you may require over the next 5-10 years are detailed below  Some tests may not apply to you based off risk factors and/or age  Screening tests ordered at today's visit but not completed yet may show as past due  Also, please note that scanned in results may not display below    Preventive Screenings:  Service Recommendations Previous Testing/Comments   Colorectal Cancer Screening  * Colonoscopy    * Fecal Occult Blood Test (FOBT)/Fecal Immunochemical Test (FIT)  * Fecal DNA/Cologuard Test  * Flexible Sigmoidoscopy Age: 54-65 years old   Colonoscopy: every 10 years (may be performed more frequently if at higher risk)  OR  FOBT/FIT: every 1 year  OR  Cologuard: every 3 years  OR  Sigmoidoscopy: every 5 years  Screening may be recommended earlier than age 48 if at higher risk for colorectal cancer  Also, an individualized decision between you and your healthcare provider will decide whether screening between the ages of 74-80 would be appropriate  Colonoscopy: 07/11/2002  FOBT/FIT: 05/18/2019  Cologuard: Not on file  Sigmoidoscopy: Not on file    Screening Current     Breast Cancer Screening Age: 36 years old  Frequency: every 1-2 years  Not required if history of left and right mastectomy Mammogram: 07/02/2019    Screening Current   Cervical Cancer Screening Between the ages of 21-29, pap smear recommended once every 3 years  Between the ages of 33-67, can perform pap smear with HPV co-testing every 5 years  Recommendations may differ for women with a history of total hysterectomy, cervical cancer, or abnormal pap smears in past  Pap Smear: Not on file    Screening Not Indicated   Hepatitis C Screening Once for adults born between 1945 and 1965  More frequently in patients at high risk for Hepatitis C Hep C Antibody: 04/25/2018    Screening Current   Diabetes Screening 1-2 times per year if you're at risk for diabetes or have pre-diabetes Fasting glucose: 101 mg/dL   A1C: 6 1 %    Screening Current   Cholesterol Screening Once every 5 years if you don't have a lipid disorder  May order more often based on risk factors  Lipid panel: 11/19/2018    Screening Not Indicated  History Lipid Disorder     Other Preventive Screenings Covered by Medicare:  1  Abdominal Aortic Aneurysm (AAA) Screening: covered once if your at risk  You're considered to be at risk if you have a family history of AAA    2  Lung Cancer Screening: covers low dose CT scan once per year if you meet all of the following conditions: (1) Age 50-69; (2) No signs or symptoms of lung cancer; (3) Current smoker or have quit smoking within the last 15 years; (4) You have a tobacco smoking history of at least 30 pack years (packs per day multiplied by number of years you smoked); (5) You get a written order from a healthcare provider  3  Glaucoma Screening: covered annually if you're considered high risk: (1) You have diabetes OR (2) Family history of glaucoma OR (3)  aged 48 and older OR (3)  American aged 72 and older  3  Osteoporosis Screening: covered every 2 years if you meet one of the following conditions: (1) You're estrogen deficient and at risk for osteoporosis based off medical history and other findings; (2) Have a vertebral abnormality; (3) On glucocorticoid therapy for more than 3 months; (4) Have primary hyperparathyroidism; (5) On osteoporosis medications and need to assess response to drug therapy  · Last bone density test (DXA Scan): Not on file  5  HIV Screening: covered annually if you're between the age of 12-76  Also covered annually if you are younger than 13 and older than 72 with risk factors for HIV infection  For pregnant patients, it is covered up to 3 times per pregnancy  Immunizations:  Immunization Recommendations   Influenza Vaccine Annual influenza vaccination during flu season is recommended for all persons aged >= 6 months who do not have contraindications   Pneumococcal Vaccine (Prevnar and Pneumovax)  * Prevnar = PCV13  * Pneumovax = PPSV23   Adults 25-60 years old: 1-3 doses may be recommended based on certain risk factors  Adults 72 years old: Prevnar (PCV13) vaccine recommended followed by Pneumovax (PPSV23) vaccine  If already received PPSV23 since turning 65, then PCV13 recommended at least one year after PPSV23 dose  Hepatitis B Vaccine 3 dose series if at intermediate or high risk (ex: diabetes, end stage renal disease, liver disease)   Tetanus (Td) Vaccine - COST NOT COVERED BY MEDICARE PART B Following completion of primary series, a booster dose should be given every 10 years to maintain immunity against tetanus   Td may also be given as tetanus wound prophylaxis  Tdap Vaccine - COST NOT COVERED BY MEDICARE PART B Recommended at least once for all adults  For pregnant patients, recommended with each pregnancy  Shingles Vaccine (Shingrix) - COST NOT COVERED BY MEDICARE PART B  2 shot series recommended in those aged 48 and above     Health Maintenance Due:      Topic Date Due    CRC Screening: Colonoscopy  07/11/2012    MAMMOGRAM  07/02/2020    Hepatitis C Screening  Completed     Immunizations Due:      Topic Date Due    DTaP,Tdap,and Td Vaccines (1 - Tdap) 07/31/1966    Pneumococcal Vaccine: 65+ Years (1 of 2 - PCV13) 07/31/2010     Advance Directives   What are advance directives? Advance directives are legal documents that state your wishes and plans for medical care  These plans are made ahead of time in case you lose your ability to make decisions for yourself  Advance directives can apply to any medical decision, such as the treatments you want, and if you want to donate organs  What are the types of advance directives? There are many types of advance directives, and each state has rules about how to use them  You may choose a combination of any of the following:  · Living will: This is a written record of the treatment you want  You can also choose which treatments you do not want, which to limit, and which to stop at a certain time  This includes surgery, medicine, IV fluid, and tube feedings  · Durable power of  for healthcare [facility]): This is a written record that states who you want to make healthcare choices for you when you are unable to make them for yourself  This person, called a proxy, is usually a family member or a friend  You may choose more than 1 proxy  · Do not resuscitate (DNR) order:  A DNR order is used in case your heart stops beating or you stop breathing  It is a request not to have certain forms of treatment, such as CPR  A DNR order may be included in other types of advance directives    · Medical directive: This covers the care that you want if you are in a coma, near death, or unable to make decisions for yourself  You can list the treatments you want for each condition  Treatment may include pain medicine, surgery, blood transfusions, dialysis, IV or tube feedings, and a ventilator (breathing machine)  · Values history: This document has questions about your views, beliefs, and how you feel and think about life  This information can help others choose the care that you would choose  Why are advance directives important? An advance directive helps you control your care  Although spoken wishes may be used, it is better to have your wishes written down  Spoken wishes can be misunderstood, or not followed  Treatments may be given even if you do not want them  An advance directive may make it easier for your family to make difficult choices about your care  Urinary Incontinence   Urinary incontinence (UI)  is when you lose control of your bladder  UI develops because your bladder cannot store or empty urine properly  The 3 most common types of UI are stress incontinence, urge incontinence, or both  Medicines:   · May be given to help strengthen your bladder control  Report any side effects of medication to your healthcare provider  Do pelvic muscle exercises often:  Your pelvic muscles help you stop urinating  Squeeze these muscles tight for 5 seconds, then relax for 5 seconds  Gradually work up to squeezing for 10 seconds  Do 3 sets of 15 repetitions a day, or as directed  This will help strengthen your pelvic muscles and improve bladder control  Train your bladder:  Go to the bathroom at set times, such as every 2 hours, even if you do not feel the urge to go  You can also try to hold your urine when you feel the urge to go  For example, hold your urine for 5 minutes when you feel the urge to go  As that becomes easier, hold your urine for 10 minutes  Self-care:   · Keep a UI record    Write down how often you leak urine and how much you leak  Make a note of what you were doing when you leaked urine  · Drink liquids as directed  You may need to limit the amount of liquid you drink to help control your urine leakage  Do not drink any liquid right before you go to bed  Limit or do not have drinks that contain caffeine or alcohol  · Prevent constipation  Eat a variety of high-fiber foods  Good examples are high-fiber cereals, beans, vegetables, and whole-grain breads  Walking is the best way to trigger your intestines to have a bowel movement  · Exercise regularly and maintain a healthy weight  Weight loss and exercise will decrease pressure on your bladder and help you control your leakage  · Use a catheter as directed  to help empty your bladder  A catheter is a tiny, plastic tube that is put into your bladder to drain your urine  · Go to behavior therapy as directed  Behavior therapy may be used to help you learn to control your urge to urinate  Weight Management   Why it is important to manage your weight:  Being overweight increases your risk of health conditions such as heart disease, high blood pressure, type 2 diabetes, and certain types of cancer  It can also increase your risk for osteoarthritis, sleep apnea, and other respiratory problems  Aim for a slow, steady weight loss  Even a small amount of weight loss can lower your risk of health problems  How to lose weight safely:  A safe and healthy way to lose weight is to eat fewer calories and get regular exercise  You can lose up about 1 pound a week by decreasing the number of calories you eat by 500 calories each day  Healthy meal plan for weight management:  A healthy meal plan includes a variety of foods, contains fewer calories, and helps you stay healthy  A healthy meal plan includes the following:  · Eat whole-grain foods more often  A healthy meal plan should contain fiber   Fiber is the part of grains, fruits, and vegetables that is not broken down by your body  Whole-grain foods are healthy and provide extra fiber in your diet  Some examples of whole-grain foods are whole-wheat breads and pastas, oatmeal, brown rice, and bulgur  · Eat a variety of vegetables every day  Include dark, leafy greens such as spinach, kale, arsen greens, and mustard greens  Eat yellow and orange vegetables such as carrots, sweet potatoes, and winter squash  · Eat a variety of fruits every day  Choose fresh or canned fruit (canned in its own juice or light syrup) instead of juice  Fruit juice has very little or no fiber  · Eat low-fat dairy foods  Drink fat-free (skim) milk or 1% milk  Eat fat-free yogurt and low-fat cottage cheese  Try low-fat cheeses such as mozzarella and other reduced-fat cheeses  · Choose meat and other protein foods that are low in fat  Choose beans or other legumes such as split peas or lentils  Choose fish, skinless poultry (chicken or turkey), or lean cuts of red meat (beef or pork)  Before you cook meat or poultry, cut off any visible fat  · Use less fat and oil  Try baking foods instead of frying them  Add less fat, such as margarine, sour cream, regular salad dressing and mayonnaise to foods  Eat fewer high-fat foods  Some examples of high-fat foods include french fries, doughnuts, ice cream, and cakes  · Eat fewer sweets  Limit foods and drinks that are high in sugar  This includes candy, cookies, regular soda, and sweetened drinks  Exercise:  Exercise at least 30 minutes per day on most days of the week  Some examples of exercise include walking, biking, dancing, and swimming  You can also fit in more physical activity by taking the stairs instead of the elevator or parking farther away from stores  Ask your healthcare provider about the best exercise plan for you        © Copyright [employer] 2018 Information is for End User's use only and may not be sold, redistributed or otherwise used for commercial purposes   All illustrations and images included in CareNotes® are the copyrighted property of [employer]  or Jerrica Valero

## 2019-11-07 NOTE — PROGRESS NOTES
HF Assessment/Plan:  Problem List Items Addressed This Visit        Digestive    GERD without esophagitis       Musculoskeletal and Integument    Primary osteoarthritis involving multiple joints    Relevant Medications    diclofenac sodium (VOLTAREN) 1 %    Other Relevant Orders    CBC and differential       Other    Hyperlipidemia - Primary    Relevant Orders    Comprehensive metabolic panel    Lipid Panel with Direct LDL reflex    TSH, 3rd generation with Free T4 reflex    Vitamin D deficiency    Relevant Orders    Vitamin D 25 hydroxy      Other Visit Diagnoses     Negative depression screening        Screening for deficiency anemia        Relevant Orders    CBC and differential    Medicare annual wellness visit, initial        Hand pain, right        Relevant Medications    ibuprofen (MOTRIN) 600 mg tablet           Diagnoses and all orders for this visit:    Mixed hyperlipidemia  -     Comprehensive metabolic panel; Future  -     Lipid Panel with Direct LDL reflex; Future  -     TSH, 3rd generation with Free T4 reflex; Future    Vitamin D deficiency  -     Vitamin D 25 hydroxy; Future    GERD without esophagitis    Primary osteoarthritis involving multiple joints  -     CBC and differential; Future  -     diclofenac sodium (VOLTAREN) 1 %; Apply 2 g topically 4 (four) times a day    Negative depression screening    Screening for deficiency anemia  -     CBC and differential; Future    Medicare annual wellness visit, initial    Hand pain, right  -     ibuprofen (MOTRIN) 600 mg tablet; Take 1 tablet (600 mg total) by mouth every 6 (six) hours as needed for mild pain    Other orders  -     Alfalfa 250 MG TABS; alfalfa  -     Coenzyme Q10 (COQ-10) 10 MG CAPS; CoQ-10  -     B Complex Vitamins (VITAMIN B COMPLEX 100 IJ); vitamin B complex  -     cholecalciferol (VITAMIN D) 400 units/mL;  Vitamin D  -     pyridoxine (VITAMIN B6) 100 mg tablet; Vitamin B-6  -     Omega-3 Fatty Acids (FISH OIL) 1,000 mg; Fish Oil  - triamcinolone (KENALOG) 0 1 % cream; triamcinolone acetonide 0 1 % topical cream        No problem-specific Assessment & Plan notes found for this encounter  A/P: Doing well and will check labs  Discussed vaccines, but reports being allergic in the past to the vaccines  Discussed the voltaren and will give it to her, BUT informed her that she can't use both oral NSAID and topical at the same time due to side effects  Pt voiced understanding  Continue current treatment and RTC six months for routine  Subjective:      Patient ID: Rc Klein is a 76 y o  female  HF RTC for f/u hyperlipidemia, DJD, etc  Doing ok and no new issues, but DJD pain continues and reports trying a family members voltaren gel and having good relief  Remains active w/o difficulty and no falls  No reflux and no renal stones  Due for labs and vaccines  The following portions of the patient's history were reviewed and updated as appropriate:   She has a past medical history of Abnormal electrocardiogram, Chronic kidney disease, GERD (gastroesophageal reflux disease), Kidney stone, and Primary osteoarthritis involving multiple joints (11/7/2019)  ,  does not have any pertinent problems on file  ,   has a past surgical history that includes Tubal ligation; Tonsillectomy; Tooth extraction; and Eye surgery  ,  family history includes Aneurysm in her father; Cirrhosis in her brother; Hypertension in her sister; Nephrolithiasis in her father; No Known Problems in her daughter, daughter, daughter, daughter, maternal aunt, and sister; Other in her mother  ,   reports that she has quit smoking  She has never used smokeless tobacco  She reports that she drinks alcohol  She reports that she does not use drugs  ,  is allergic to flu virus vaccine and furosemide     Current Outpatient Medications   Medication Sig Dispense Refill    Crane 250 MG TABS alfalfa      B Complex Vitamins (VITAMIN B COMPLEX 100 IJ) vitamin B complex      cholecalciferol (VITAMIN D) 400 units/mL Vitamin D      Coenzyme Q10 (COQ-10) 10 MG CAPS CoQ-10      diclofenac sodium (VOLTAREN) 1 % Apply 2 g topically 4 (four) times a day 100 g 3    ibuprofen (MOTRIN) 600 mg tablet Take 1 tablet (600 mg total) by mouth every 6 (six) hours as needed for mild pain 60 tablet 3    Omega-3 Fatty Acids (FISH OIL) 1,000 mg Fish Oil      pyridoxine (VITAMIN B6) 100 mg tablet Vitamin B-6      triamcinolone (KENALOG) 0 1 % cream triamcinolone acetonide 0 1 % topical cream       No current facility-administered medications for this visit  Review of Systems   Constitutional: Negative for activity change, chills, diaphoresis, fatigue and fever  HENT: Negative  Eyes: Negative for visual disturbance  Respiratory: Negative for cough, chest tightness, shortness of breath and wheezing  Cardiovascular: Negative for chest pain, palpitations and leg swelling  Gastrointestinal: Negative for abdominal pain, constipation, diarrhea, nausea and vomiting  Endocrine: Negative for cold intolerance and heat intolerance  Genitourinary: Negative for difficulty urinating, dysuria and frequency  Musculoskeletal: Positive for arthralgias and back pain  Negative for gait problem, joint swelling and myalgias  Neurological: Negative for dizziness, seizures, syncope, weakness, light-headedness and headaches  Psychiatric/Behavioral: Negative for confusion, dysphoric mood and sleep disturbance  The patient is not nervous/anxious          PHQ-9 Depression Screening    PHQ-9:    Frequency of the following problems over the past two weeks:       Little interest or pleasure in doing things:  0 - not at all  Feeling down, depressed, or hopeless:  0 - not at all  PHQ-2 Score:  0        Objective:  Vitals:    11/07/19 1330   BP: 110/62   Pulse: 70   Resp: 14   Temp: 97 6 °F (36 4 °C)   TempSrc: Tympanic   Weight: 74 8 kg (165 lb)   Height: 4' 11 5" (1 511 m)     Body mass index is 32 77 kg/m²  Physical Exam   Constitutional: She is oriented to person, place, and time  She appears well-developed and well-nourished  No distress  HENT:   Head: Normocephalic and atraumatic  Mouth/Throat: Oropharynx is clear and moist    Eyes: Pupils are equal, round, and reactive to light  Conjunctivae and EOM are normal    Neck: Neck supple  No JVD present  Cardiovascular: Normal rate, regular rhythm and normal heart sounds  Pulmonary/Chest: Effort normal and breath sounds normal  No respiratory distress  She has no wheezes  She has no rales  Abdominal: Soft  She exhibits no distension  There is no tenderness  Musculoskeletal: She exhibits no edema  Neurological: She is alert and oriented to person, place, and time  Psychiatric: She has a normal mood and affect  Her behavior is normal  Judgment and thought content normal    Nursing note and vitals reviewed

## 2019-11-07 NOTE — PROGRESS NOTES
Assessment and Plan:     Problem List Items Addressed This Visit        Digestive    GERD without esophagitis       Musculoskeletal and Integument    Primary osteoarthritis involving multiple joints    Relevant Orders    CBC and differential       Other    Hyperlipidemia - Primary    Relevant Orders    Comprehensive metabolic panel    Lipid Panel with Direct LDL reflex    TSH, 3rd generation with Free T4 reflex    Vitamin D deficiency    Relevant Orders    Vitamin D 25 hydroxy      Other Visit Diagnoses     Negative depression screening        Screening for deficiency anemia        Relevant Orders    CBC and differential    Medicare annual wellness visit, initial        Hand pain, right               Preventive health issues were discussed with patient, and age appropriate screening tests were ordered as noted in patient's After Visit Summary  Personalized health advice and appropriate referrals for health education or preventive services given if needed, as noted in patient's After Visit Summary       History of Present Illness:     Patient presents for Medicare Annual Wellness visit    Patient Care Team:  Reyes Ganser, DO as PCP - General (Internal Medicine)     Problem List:     Patient Active Problem List   Diagnosis    Chronic lower back pain    Chronic right shoulder pain    GERD without esophagitis    Hyperlipidemia    Nephrolithiasis    Sciatica of left side    Vitamin D deficiency    Primary osteoarthritis involving multiple joints      Past Medical and Surgical History:     Past Medical History:   Diagnosis Date    Abnormal electrocardiogram     last assessed 1/8/16    Chronic kidney disease     GERD (gastroesophageal reflux disease)     Kidney stone     Primary osteoarthritis involving multiple joints 11/7/2019     Past Surgical History:   Procedure Laterality Date    EYE SURGERY      TONSILLECTOMY      TOOTH EXTRACTION      TUBAL LIGATION        Family History:     Family History Problem Relation Age of Onset    Other Mother         sheehans syndrome    Aneurysm Father     Nephrolithiasis Father     Hypertension Sister     Cirrhosis Brother         alcoholic liver    No Known Problems Daughter     No Known Problems Sister     No Known Problems Daughter     No Known Problems Daughter     No Known Problems Daughter     No Known Problems Maternal Aunt       Social History:     Social History     Socioeconomic History    Marital status: /Civil Union     Spouse name: None    Number of children: None    Years of education: None    Highest education level: None   Occupational History    Occupation: Retired   Social Needs    Financial resource strain: None    Food insecurity:     Worry: None     Inability: None    Transportation needs:     Medical: None     Non-medical: None   Tobacco Use    Smoking status: Former Smoker    Smokeless tobacco: Never Used   Substance and Sexual Activity    Alcohol use: Yes     Comment: occasional    Drug use: No    Sexual activity: None   Lifestyle    Physical activity:     Days per week: None     Minutes per session: None    Stress: None   Relationships    Social connections:     Talks on phone: None     Gets together: None     Attends Anabaptism service: None     Active member of club or organization: None     Attends meetings of clubs or organizations: None     Relationship status: None    Intimate partner violence:     Fear of current or ex partner: None     Emotionally abused: None     Physically abused: None     Forced sexual activity: None   Other Topics Concern    None   Social History Narrative    RETIRED       Medications and Allergies:     Current Outpatient Medications   Medication Sig Dispense Refill    [place]et 250 MG TABS alfalfa      B Complex Vitamins (VITAMIN B COMPLEX 100 IJ) vitamin B complex      cholecalciferol (VITAMIN D) 400 units/mL Vitamin D      Coenzyme Q10 (COQ-10) 10 MG CAPS CoQ-10      ibuprofen (MOTRIN) 600 mg tablet Take 1 tablet (600 mg total) by mouth every 6 (six) hours as needed for mild pain 30 tablet 0    Omega-3 Fatty Acids (FISH OIL) 1,000 mg Fish Oil      pyridoxine (VITAMIN B6) 100 mg tablet Vitamin B-6      triamcinolone (KENALOG) 0 1 % cream triamcinolone acetonide 0 1 % topical cream       No current facility-administered medications for this visit  Allergies   Allergen Reactions    Flu Virus Vaccine     Furosemide       Immunizations: There is no immunization history on file for this patient  Health Maintenance:         Topic Date Due    CRC Screening: Colonoscopy  07/11/2012    MAMMOGRAM  07/02/2020    Hepatitis C Screening  Completed         Topic Date Due    DTaP,Tdap,and Td Vaccines (1 - Tdap) 07/31/1966    Pneumococcal Vaccine: 65+ Years (1 of 2 - PCV13) 07/31/2010      Medicare Health Risk Assessment:     /62   Pulse 70   Temp 97 6 °F (36 4 °C) (Tympanic)   Resp 14   Ht 4' 11 5" (1 511 m)   Wt 74 8 kg (165 lb)   BMI 32 77 kg/m²      Arnel is here for her Initial Wellness visit  Health Risk Assessment:   Patient rates overall health as very good  Patient feels that their physical health rating is much better  Eyesight was rated as same  Hearing was rated as slightly worse  Patient feels that their emotional and mental health rating is slightly better  Pain experienced in the last 7 days has been a lot  Patient's pain rating has been 10/10  Patient states that she has experienced no weight loss or gain in last 6 months  Depression Screening:   PHQ-2 Score: 0      Fall Risk Screening: In the past year, patient has experienced: no history of falling in past year      Urinary Incontinence Screening:   Patient has not leaked urine accidently in the last six months  Home Safety:  Patient does not have trouble with stairs inside or outside of their home  Patient has working smoke alarms and has working carbon monoxide detector   Home safety hazards include: loose rugs on the floor  Nutrition:   Current diet is Regular and Limited junk food  Medications:   Patient is currently taking over-the-counter supplements  OTC medications include: see medication list  Patient is able to manage medications  Activities of Daily Living (ADLs)/Instrumental Activities of Daily Living (IADLs):   Walk and transfer into and out of bed and chair?: Yes  Dress and groom yourself?: Yes    Bathe or shower yourself?: Yes    Feed yourself? Yes  Do your laundry/housekeeping?: Yes  Manage your money, pay your bills and track your expenses?: Yes  Make your own meals?: Yes    Do your own shopping?: Yes    Previous Hospitalizations:   Any hospitalizations or ED visits within the last 12 months?: No      Advance Care Planning:   Living will: Yes    Durable POA for healthcare:  Yes    Advanced directive: Yes    Advanced directive counseling given: No    Five wishes given: No    Patient declined ACP directive: No    End of Life Decisions reviewed with patient: Yes      Cognitive Screening:   Provider or family/friend/caregiver concerned regarding cognition?: No    PREVENTIVE SCREENINGS      Cardiovascular Screening:    General: History Lipid Disorder and Screening Current    Due for: Lipid Panel      Diabetes Screening:     General: Screening Current    Due for: Blood Glucose      Colorectal Cancer Screening:     General: Screening Current      Breast Cancer Screening:     General: Screening Current      Cervical Cancer Screening:    General: Screening Not Indicated      Osteoporosis Screening:    General: Risks and Benefits Discussed and Patient Declines      Abdominal Aortic Aneurysm (AAA) Screening:        General: Screening Not Indicated      Lung Cancer Screening:     General: Screening Not Indicated      Hepatitis C Screening:    General: Screening Current    Other Counseling Topics:   Car/seat belt/driving safety and calcium and vitamin D intake and regular weightbearing exercise  A/P: Doing well and no falls reported  Denies depression and feels safe at home  Diverse diet  No problems operating a MV and uses seat belts  Has a living will and POA  No DME or referrals needed today  RTC one year for medicare wellness       Reyes Ganser, DO

## 2019-11-11 ENCOUNTER — APPOINTMENT (OUTPATIENT)
Dept: LAB | Facility: CLINIC | Age: 74
End: 2019-11-11
Payer: COMMERCIAL

## 2019-11-11 DIAGNOSIS — E55.9 VITAMIN D DEFICIENCY: ICD-10-CM

## 2019-11-11 DIAGNOSIS — M15.9 PRIMARY OSTEOARTHRITIS INVOLVING MULTIPLE JOINTS: ICD-10-CM

## 2019-11-11 DIAGNOSIS — E78.2 MIXED HYPERLIPIDEMIA: ICD-10-CM

## 2019-11-11 DIAGNOSIS — Z13.0 SCREENING FOR DEFICIENCY ANEMIA: ICD-10-CM

## 2019-11-11 LAB
25(OH)D3 SERPL-MCNC: 16.3 NG/ML (ref 30–100)
ALBUMIN SERPL BCP-MCNC: 3.8 G/DL (ref 3.5–5)
ALP SERPL-CCNC: 107 U/L (ref 46–116)
ALT SERPL W P-5'-P-CCNC: 26 U/L (ref 12–78)
ANION GAP SERPL CALCULATED.3IONS-SCNC: 7 MMOL/L (ref 4–13)
AST SERPL W P-5'-P-CCNC: 28 U/L (ref 5–45)
BASOPHILS # BLD AUTO: 0.06 THOUSANDS/ΜL (ref 0–0.1)
BASOPHILS NFR BLD AUTO: 1 % (ref 0–1)
BILIRUB SERPL-MCNC: 0.39 MG/DL (ref 0.2–1)
BUN SERPL-MCNC: 19 MG/DL (ref 5–25)
CALCIUM SERPL-MCNC: 9.1 MG/DL (ref 8.3–10.1)
CHLORIDE SERPL-SCNC: 108 MMOL/L (ref 100–108)
CHOLEST SERPL-MCNC: 199 MG/DL (ref 50–200)
CO2 SERPL-SCNC: 25 MMOL/L (ref 21–32)
CREAT SERPL-MCNC: 0.76 MG/DL (ref 0.6–1.3)
EOSINOPHIL # BLD AUTO: 0.67 THOUSAND/ΜL (ref 0–0.61)
EOSINOPHIL NFR BLD AUTO: 12 % (ref 0–6)
ERYTHROCYTE [DISTWIDTH] IN BLOOD BY AUTOMATED COUNT: 13.9 % (ref 11.6–15.1)
GFR SERPL CREATININE-BSD FRML MDRD: 78 ML/MIN/1.73SQ M
GLUCOSE P FAST SERPL-MCNC: 104 MG/DL (ref 65–99)
HCT VFR BLD AUTO: 39.9 % (ref 34.8–46.1)
HDLC SERPL-MCNC: 79 MG/DL
HGB BLD-MCNC: 12.3 G/DL (ref 11.5–15.4)
IMM GRANULOCYTES # BLD AUTO: 0.02 THOUSAND/UL (ref 0–0.2)
IMM GRANULOCYTES NFR BLD AUTO: 0 % (ref 0–2)
LDLC SERPL CALC-MCNC: 109 MG/DL (ref 0–100)
LYMPHOCYTES # BLD AUTO: 1.52 THOUSANDS/ΜL (ref 0.6–4.47)
LYMPHOCYTES NFR BLD AUTO: 28 % (ref 14–44)
MCH RBC QN AUTO: 28 PG (ref 26.8–34.3)
MCHC RBC AUTO-ENTMCNC: 30.8 G/DL (ref 31.4–37.4)
MCV RBC AUTO: 91 FL (ref 82–98)
MONOCYTES # BLD AUTO: 0.44 THOUSAND/ΜL (ref 0.17–1.22)
MONOCYTES NFR BLD AUTO: 8 % (ref 4–12)
NEUTROPHILS # BLD AUTO: 2.77 THOUSANDS/ΜL (ref 1.85–7.62)
NEUTS SEG NFR BLD AUTO: 51 % (ref 43–75)
NRBC BLD AUTO-RTO: 0 /100 WBCS
PLATELET # BLD AUTO: 213 THOUSANDS/UL (ref 149–390)
PMV BLD AUTO: 11.5 FL (ref 8.9–12.7)
POTASSIUM SERPL-SCNC: 3.8 MMOL/L (ref 3.5–5.3)
PROT SERPL-MCNC: 7.6 G/DL (ref 6.4–8.2)
RBC # BLD AUTO: 4.4 MILLION/UL (ref 3.81–5.12)
SODIUM SERPL-SCNC: 140 MMOL/L (ref 136–145)
TRIGL SERPL-MCNC: 53 MG/DL
TSH SERPL DL<=0.05 MIU/L-ACNC: 2.83 UIU/ML (ref 0.36–3.74)
WBC # BLD AUTO: 5.48 THOUSAND/UL (ref 4.31–10.16)

## 2019-11-11 PROCEDURE — 80061 LIPID PANEL: CPT

## 2019-11-11 PROCEDURE — 84443 ASSAY THYROID STIM HORMONE: CPT

## 2019-11-11 PROCEDURE — 85025 COMPLETE CBC W/AUTO DIFF WBC: CPT

## 2019-11-11 PROCEDURE — 82306 VITAMIN D 25 HYDROXY: CPT

## 2019-11-11 PROCEDURE — 80053 COMPREHEN METABOLIC PANEL: CPT

## 2019-11-11 PROCEDURE — 36415 COLL VENOUS BLD VENIPUNCTURE: CPT

## 2020-01-06 ENCOUNTER — OFFICE VISIT (OUTPATIENT)
Dept: INTERNAL MEDICINE CLINIC | Facility: CLINIC | Age: 75
End: 2020-01-06
Payer: COMMERCIAL

## 2020-01-06 VITALS
HEART RATE: 82 BPM | RESPIRATION RATE: 16 BRPM | DIASTOLIC BLOOD PRESSURE: 70 MMHG | BODY MASS INDEX: 32.63 KG/M2 | TEMPERATURE: 98.2 F | OXYGEN SATURATION: 98 % | WEIGHT: 166.2 LBS | SYSTOLIC BLOOD PRESSURE: 102 MMHG | HEIGHT: 60 IN

## 2020-01-06 DIAGNOSIS — J01.10 ACUTE NON-RECURRENT FRONTAL SINUSITIS: Primary | ICD-10-CM

## 2020-01-06 PROCEDURE — 3725F SCREEN DEPRESSION PERFORMED: CPT | Performed by: PHYSICIAN ASSISTANT

## 2020-01-06 PROCEDURE — 1036F TOBACCO NON-USER: CPT | Performed by: PHYSICIAN ASSISTANT

## 2020-01-06 PROCEDURE — 1160F RVW MEDS BY RX/DR IN RCRD: CPT | Performed by: PHYSICIAN ASSISTANT

## 2020-01-06 PROCEDURE — 3008F BODY MASS INDEX DOCD: CPT | Performed by: PHYSICIAN ASSISTANT

## 2020-01-06 PROCEDURE — 99214 OFFICE O/P EST MOD 30 MIN: CPT | Performed by: PHYSICIAN ASSISTANT

## 2020-01-06 RX ORDER — AMOXICILLIN 500 MG/1
500 CAPSULE ORAL EVERY 8 HOURS SCHEDULED
Qty: 30 CAPSULE | Refills: 0 | Status: SHIPPED | OUTPATIENT
Start: 2020-01-06 | End: 2020-01-16

## 2020-01-06 NOTE — PROGRESS NOTES
Assessment/Plan:  Problem List Items Addressed This Visit        Respiratory    Acute non-recurrent frontal sinusitis - Primary     Start amoxil  The patient was instructed to change their toothbrush to avoid reinfection  Relevant Medications    amoxicillin (AMOXIL) 500 mg capsule           Diagnoses and all orders for this visit:    Acute non-recurrent frontal sinusitis  -     amoxicillin (AMOXIL) 500 mg capsule; Take 1 capsule (500 mg total) by mouth every 8 (eight) hours for 10 days        Acute non-recurrent frontal sinusitis  Start amoxil  The patient was instructed to change their toothbrush to avoid reinfection  Subjective:      Patient ID: Faina Dahl is a 76 y o  female  URI    This is a new problem  The current episode started 1 to 4 weeks ago  The problem has been unchanged  There has been no fever  Associated symptoms include congestion, coughing, ear pain, headaches, a plugged ear sensation, rhinorrhea and a sore throat  Pertinent negatives include no abdominal pain, chest pain, diarrhea, dysuria, nausea, rash, sinus pain, vomiting or wheezing  She has tried nothing for the symptoms  The treatment provided mild relief  The following portions of the patient's history were reviewed and updated as appropriate:   She has a past medical history of Abnormal electrocardiogram, Chronic kidney disease, GERD (gastroesophageal reflux disease), Kidney stone, and Primary osteoarthritis involving multiple joints (11/7/2019)  ,  does not have any pertinent problems on file  ,   has a past surgical history that includes Tubal ligation; Tonsillectomy; Tooth extraction; and Eye surgery  ,  family history includes Aneurysm in her father; Cirrhosis in her brother; Hypertension in her sister; Nephrolithiasis in her father; No Known Problems in her daughter, daughter, daughter, daughter, maternal aunt, and sister; Other in her mother  ,   reports that she has quit smoking   She has never used smokeless tobacco  She reports that she drinks alcohol  She reports that she does not use drugs  ,  is allergic to flu virus vaccine and furosemide     Current Outpatient Medications   Medication Sig Dispense Refill    Caroline 250 MG TABS alfalfa      B Complex Vitamins (VITAMIN B COMPLEX 100 IJ) vitamin B complex      cholecalciferol (VITAMIN D) 400 units/mL Vitamin D      Coenzyme Q10 (COQ-10) 10 MG CAPS CoQ-10      diclofenac sodium (VOLTAREN) 1 % Apply 2 g topically 4 (four) times a day 100 g 3    ibuprofen (MOTRIN) 600 mg tablet Take 1 tablet (600 mg total) by mouth every 6 (six) hours as needed for mild pain 60 tablet 3    Omega-3 Fatty Acids (FISH OIL) 1,000 mg Fish Oil      pyridoxine (VITAMIN B6) 100 mg tablet Vitamin B-6      triamcinolone (KENALOG) 0 1 % cream triamcinolone acetonide 0 1 % topical cream      amoxicillin (AMOXIL) 500 mg capsule Take 1 capsule (500 mg total) by mouth every 8 (eight) hours for 10 days 30 capsule 0     No current facility-administered medications for this visit  Review of Systems   Constitutional: Negative for chills and fever  HENT: Positive for congestion, ear pain, rhinorrhea and sore throat  Negative for hearing loss, postnasal drip, sinus pressure, sinus pain and trouble swallowing  Eyes: Negative for pain and visual disturbance  Respiratory: Positive for cough  Negative for chest tightness, shortness of breath and wheezing  Cardiovascular: Negative  Negative for chest pain, palpitations and leg swelling  Gastrointestinal: Negative for abdominal pain, blood in stool, constipation, diarrhea, nausea and vomiting  Endocrine: Negative for cold intolerance, heat intolerance, polydipsia, polyphagia and polyuria  Genitourinary: Negative for difficulty urinating, dysuria, flank pain and urgency  Musculoskeletal: Negative for arthralgias, back pain, gait problem and myalgias  Skin: Negative for rash  Allergic/Immunologic: Negative  Neurological: Positive for headaches  Negative for dizziness, weakness and light-headedness  Hematological: Negative  Psychiatric/Behavioral: Negative for behavioral problems, dysphoric mood and sleep disturbance  The patient is not nervous/anxious  PHQ-9 Depression Screening    PHQ-9:    Frequency of the following problems over the past two weeks:       Little interest or pleasure in doing things:  0 - not at all  Feeling down, depressed, or hopeless:  0 - not at all  PHQ-2 Score:  0          Objective:  Vitals:    01/06/20 1400   BP: 102/70   BP Location: Left arm   Patient Position: Sitting   Cuff Size: Adult   Pulse: 82   Resp: 16   Temp: 98 2 °F (36 8 °C)   TempSrc: Tympanic   SpO2: 98%   Weight: 75 4 kg (166 lb 3 2 oz)   Height: 4' 11 5" (1 511 m)     Body mass index is 33 01 kg/m²  Physical Exam   Constitutional: She is oriented to person, place, and time  She appears well-developed and well-nourished  No distress  HENT:   Head: Normocephalic and atraumatic  Right Ear: External ear normal  A middle ear effusion is present  Left Ear: External ear normal    Nose: Mucosal edema and rhinorrhea present  Mouth/Throat: Oropharynx is clear and moist  No oropharyngeal exudate  Eyes: Pupils are equal, round, and reactive to light  Conjunctivae and EOM are normal  Right eye exhibits no discharge  Left eye exhibits no discharge  No scleral icterus  Neck: Normal range of motion  Neck supple  No thyromegaly present  Cardiovascular: Normal rate, regular rhythm and normal heart sounds  Exam reveals no gallop and no friction rub  No murmur heard  Pulmonary/Chest: Effort normal and breath sounds normal  No respiratory distress  She has no wheezes  She has no rales  Abdominal: Soft  Bowel sounds are normal  She exhibits no distension  There is no tenderness  Musculoskeletal: Normal range of motion  She exhibits no edema, tenderness or deformity     Neurological: She is alert and oriented to person, place, and time  No cranial nerve deficit  Skin: Skin is warm and dry  She is not diaphoretic  Psychiatric: She has a normal mood and affect  Her behavior is normal  Judgment and thought content normal        BMI Counseling: Body mass index is 33 01 kg/m²  The BMI is above normal  Nutrition recommendations include decreasing portion sizes, consuming healthier snacks and limiting drinks that contain sugar

## 2020-10-02 ENCOUNTER — OFFICE VISIT (OUTPATIENT)
Dept: INTERNAL MEDICINE CLINIC | Facility: CLINIC | Age: 75
End: 2020-10-02
Payer: COMMERCIAL

## 2020-10-02 ENCOUNTER — APPOINTMENT (OUTPATIENT)
Dept: RADIOLOGY | Facility: CLINIC | Age: 75
End: 2020-10-02
Payer: COMMERCIAL

## 2020-10-02 VITALS
SYSTOLIC BLOOD PRESSURE: 140 MMHG | DIASTOLIC BLOOD PRESSURE: 80 MMHG | BODY MASS INDEX: 33.96 KG/M2 | TEMPERATURE: 97.8 F | HEART RATE: 95 BPM | WEIGHT: 173 LBS | OXYGEN SATURATION: 95 % | HEIGHT: 60 IN

## 2020-10-02 DIAGNOSIS — R07.89 ATYPICAL CHEST PAIN: ICD-10-CM

## 2020-10-02 DIAGNOSIS — K21.9 GERD WITHOUT ESOPHAGITIS: ICD-10-CM

## 2020-10-02 DIAGNOSIS — R07.89 ATYPICAL CHEST PAIN: Primary | ICD-10-CM

## 2020-10-02 DIAGNOSIS — Z12.31 SCREENING MAMMOGRAM, ENCOUNTER FOR: ICD-10-CM

## 2020-10-02 DIAGNOSIS — M94.0 COSTOCHONDRITIS: ICD-10-CM

## 2020-10-02 DIAGNOSIS — Z23 ENCOUNTER FOR VACCINATION: ICD-10-CM

## 2020-10-02 DIAGNOSIS — K43.9 VENTRAL HERNIA WITHOUT OBSTRUCTION OR GANGRENE: ICD-10-CM

## 2020-10-02 PROBLEM — J01.10 ACUTE NON-RECURRENT FRONTAL SINUSITIS: Status: RESOLVED | Noted: 2020-01-06 | Resolved: 2020-10-02

## 2020-10-02 PROCEDURE — 71120 X-RAY EXAM BREASTBONE 2/>VWS: CPT

## 2020-10-02 PROCEDURE — 99213 OFFICE O/P EST LOW 20 MIN: CPT | Performed by: INTERNAL MEDICINE

## 2020-10-06 ENCOUNTER — HOSPITAL ENCOUNTER (OUTPATIENT)
Dept: MAMMOGRAPHY | Facility: HOSPITAL | Age: 75
Discharge: HOME | End: 2020-10-06
Payer: COMMERCIAL

## 2020-10-06 VITALS — HEIGHT: 60 IN | BODY MASS INDEX: 33.96 KG/M2 | WEIGHT: 173 LBS

## 2020-10-06 DIAGNOSIS — Z12.31 SCREENING MAMMOGRAM, ENCOUNTER FOR: ICD-10-CM

## 2020-10-06 PROCEDURE — 77063 BREAST TOMOSYNTHESIS BI: CPT

## 2020-10-06 PROCEDURE — 77067 SCR MAMMO BI INCL CAD: CPT

## 2020-10-14 ENCOUNTER — OFFICE VISIT (OUTPATIENT)
Dept: INTERNAL MEDICINE CLINIC | Facility: CLINIC | Age: 75
End: 2020-10-14
Payer: COMMERCIAL

## 2020-10-14 VITALS
HEART RATE: 88 BPM | OXYGEN SATURATION: 97 % | HEIGHT: 60 IN | TEMPERATURE: 97.5 F | SYSTOLIC BLOOD PRESSURE: 138 MMHG | BODY MASS INDEX: 33.77 KG/M2 | WEIGHT: 172 LBS | DIASTOLIC BLOOD PRESSURE: 78 MMHG | RESPIRATION RATE: 18 BRPM

## 2020-10-14 DIAGNOSIS — M94.0 COSTOCHONDRITIS: Primary | ICD-10-CM

## 2020-10-14 DIAGNOSIS — R07.89 ATYPICAL CHEST PAIN: ICD-10-CM

## 2020-10-14 DIAGNOSIS — K43.9 VENTRAL HERNIA WITHOUT OBSTRUCTION OR GANGRENE: ICD-10-CM

## 2020-10-14 PROCEDURE — 3725F SCREEN DEPRESSION PERFORMED: CPT | Performed by: INTERNAL MEDICINE

## 2020-10-14 PROCEDURE — 1160F RVW MEDS BY RX/DR IN RCRD: CPT | Performed by: INTERNAL MEDICINE

## 2020-10-14 PROCEDURE — 3288F FALL RISK ASSESSMENT DOCD: CPT | Performed by: INTERNAL MEDICINE

## 2020-10-14 PROCEDURE — 99213 OFFICE O/P EST LOW 20 MIN: CPT | Performed by: INTERNAL MEDICINE

## 2020-10-14 PROCEDURE — 1036F TOBACCO NON-USER: CPT | Performed by: INTERNAL MEDICINE

## 2020-10-14 PROCEDURE — 1101F PT FALLS ASSESS-DOCD LE1/YR: CPT | Performed by: INTERNAL MEDICINE

## 2020-12-14 ENCOUNTER — TELEMEDICINE (OUTPATIENT)
Dept: INTERNAL MEDICINE CLINIC | Facility: CLINIC | Age: 75
End: 2020-12-14
Payer: COMMERCIAL

## 2020-12-14 VITALS — BODY MASS INDEX: 34.16 KG/M2 | WEIGHT: 174 LBS | HEIGHT: 60 IN

## 2020-12-14 DIAGNOSIS — Z91.89 AT INCREASED RISK OF EXPOSURE TO COVID-19 VIRUS: ICD-10-CM

## 2020-12-14 DIAGNOSIS — J06.9 UPPER RESPIRATORY TRACT INFECTION, UNSPECIFIED TYPE: Primary | ICD-10-CM

## 2020-12-14 DIAGNOSIS — J06.9 UPPER RESPIRATORY TRACT INFECTION, UNSPECIFIED TYPE: ICD-10-CM

## 2020-12-14 PROCEDURE — U0003 INFECTIOUS AGENT DETECTION BY NUCLEIC ACID (DNA OR RNA); SEVERE ACUTE RESPIRATORY SYNDROME CORONAVIRUS 2 (SARS-COV-2) (CORONAVIRUS DISEASE [COVID-19]), AMPLIFIED PROBE TECHNIQUE, MAKING USE OF HIGH THROUGHPUT TECHNOLOGIES AS DESCRIBED BY CMS-2020-01-R: HCPCS | Performed by: INTERNAL MEDICINE

## 2020-12-14 PROCEDURE — 3008F BODY MASS INDEX DOCD: CPT | Performed by: INTERNAL MEDICINE

## 2020-12-14 PROCEDURE — 99214 OFFICE O/P EST MOD 30 MIN: CPT | Performed by: INTERNAL MEDICINE

## 2020-12-15 LAB — SARS-COV-2 RNA SPEC QL NAA+PROBE: NOT DETECTED

## 2020-12-16 ENCOUNTER — TELEMEDICINE (OUTPATIENT)
Dept: INTERNAL MEDICINE CLINIC | Facility: CLINIC | Age: 75
End: 2020-12-16
Payer: COMMERCIAL

## 2020-12-16 DIAGNOSIS — Z91.89 AT INCREASED RISK OF EXPOSURE TO COVID-19 VIRUS: ICD-10-CM

## 2020-12-16 DIAGNOSIS — J06.9 UPPER RESPIRATORY TRACT INFECTION, UNSPECIFIED TYPE: Primary | ICD-10-CM

## 2020-12-16 PROCEDURE — 1036F TOBACCO NON-USER: CPT | Performed by: INTERNAL MEDICINE

## 2020-12-16 PROCEDURE — 99213 OFFICE O/P EST LOW 20 MIN: CPT | Performed by: INTERNAL MEDICINE

## 2020-12-16 PROCEDURE — 1160F RVW MEDS BY RX/DR IN RCRD: CPT | Performed by: INTERNAL MEDICINE

## 2021-02-14 ENCOUNTER — HOSPITAL ENCOUNTER (EMERGENCY)
Facility: HOSPITAL | Age: 76
Discharge: HOME | End: 2021-02-14
Attending: EMERGENCY MEDICINE
Payer: COMMERCIAL

## 2021-02-14 VITALS
HEIGHT: 60 IN | TEMPERATURE: 97.6 F | SYSTOLIC BLOOD PRESSURE: 182 MMHG | BODY MASS INDEX: 34.56 KG/M2 | OXYGEN SATURATION: 98 % | RESPIRATION RATE: 18 BRPM | HEART RATE: 94 BPM | DIASTOLIC BLOOD PRESSURE: 76 MMHG

## 2021-02-14 DIAGNOSIS — H02.849 SWELLING OF EYELID: ICD-10-CM

## 2021-02-14 DIAGNOSIS — R68.89 ITCHY EYES: Primary | ICD-10-CM

## 2021-02-14 PROCEDURE — 99283 EMERGENCY DEPT VISIT LOW MDM: CPT

## 2021-02-14 PROCEDURE — 99284 EMERGENCY DEPT VISIT MOD MDM: CPT | Performed by: PHYSICIAN ASSISTANT

## 2021-02-14 RX ORDER — TETRACAINE HYDROCHLORIDE 5 MG/ML
1 SOLUTION OPHTHALMIC ONCE
Status: COMPLETED | OUTPATIENT
Start: 2021-02-14 | End: 2021-02-14

## 2021-02-14 RX ORDER — OLOPATADINE HYDROCHLORIDE 1 MG/ML
1 SOLUTION/ DROPS OPHTHALMIC 2 TIMES DAILY
Qty: 5 ML | Refills: 0 | Status: SHIPPED | OUTPATIENT
Start: 2021-02-14 | End: 2022-07-13

## 2021-02-14 RX ADMIN — FLUORESCEIN SODIUM 1 STRIP: 1 STRIP OPHTHALMIC at 13:12

## 2021-02-14 RX ADMIN — TETRACAINE HYDROCHLORIDE 1 DROP: 5 SOLUTION OPHTHALMIC at 13:12

## 2021-02-17 ENCOUNTER — TELEPHONE (OUTPATIENT)
Dept: OBGYN CLINIC | Facility: MEDICAL CENTER | Age: 76
End: 2021-02-17

## 2021-02-17 ENCOUNTER — OFFICE VISIT (OUTPATIENT)
Dept: OBGYN CLINIC | Facility: CLINIC | Age: 76
End: 2021-02-17
Payer: COMMERCIAL

## 2021-02-17 VITALS
SYSTOLIC BLOOD PRESSURE: 150 MMHG | HEIGHT: 60 IN | DIASTOLIC BLOOD PRESSURE: 72 MMHG | WEIGHT: 175.4 LBS | BODY MASS INDEX: 34.44 KG/M2

## 2021-02-17 DIAGNOSIS — L29.2 VULVAR ITCHING: ICD-10-CM

## 2021-02-17 DIAGNOSIS — Z01.411 ENCOUNTER FOR GYNECOLOGICAL EXAMINATION (GENERAL) (ROUTINE) WITH ABNORMAL FINDINGS: Primary | ICD-10-CM

## 2021-02-17 DIAGNOSIS — Z13.820 ENCOUNTER FOR SCREENING FOR OSTEOPOROSIS: ICD-10-CM

## 2021-02-17 DIAGNOSIS — N89.8 VAGINAL ODOR: ICD-10-CM

## 2021-02-17 DIAGNOSIS — Z12.31 ENCOUNTER FOR SCREENING MAMMOGRAM FOR MALIGNANT NEOPLASM OF BREAST: ICD-10-CM

## 2021-02-17 PROCEDURE — G0101 CA SCREEN;PELVIC/BREAST EXAM: HCPCS | Performed by: OBSTETRICS & GYNECOLOGY

## 2021-02-17 RX ORDER — NYSTATIN AND TRIAMCINOLONE ACETONIDE 100000; 1 [USP'U]/G; MG/G
OINTMENT TOPICAL 2 TIMES DAILY
Qty: 30 G | Refills: 0 | Status: SHIPPED | OUTPATIENT
Start: 2021-02-17 | End: 2022-07-13

## 2021-02-17 RX ORDER — METRONIDAZOLE 7.5 MG/G
1 GEL VAGINAL DAILY
Qty: 70 G | Refills: 0 | Status: SHIPPED | OUTPATIENT
Start: 2021-02-17 | End: 2021-02-22

## 2021-02-17 NOTE — PROGRESS NOTES
ASSESSMENT & PLAN: Sylvester Taylor was seen today for gynecologic exam     Diagnoses and all orders for this visit:    Encounter for gynecological examination (general) (routine) with abnormal findings    Encounter for screening mammogram for malignant neoplasm of breast  -     Mammo screening bilateral w 3d & cad; Future    Vulvar itching  -     nystatin-triamcinolone (MYCOLOG-II) ointment; Apply topically 2 (two) times a day    Vaginal odor  -     metroNIDAZOLE (METROGEL) 0 75 % vaginal gel; Insert 1 application into the vagina daily for 5 days    Encounter for screening for osteoporosis  -     DXA bone density spine hip and pelvis; Future      1  Routine well woman exam done today  2   Pap and HPV:  Pap and cotesting was not done today  Current ASCCP Guidelines reviewed  3   Mammogram was ordered  4   Colorectal cancer screening is not up to date  Reports she gets routine FIT tests  5   DEXA is not up to date  DEXA was ordered today  6  The following were reviewed in today's visit: adequate intake of calcium and vitamin D, exercise and healthy diet  7   F/u 1 year for next routine gyn exam   8  Pt to start mycolog II ointment for vulvar itching and irritation  Will also treat empirically with Metrogel for vaginal odor  CC:  Annual Gynecologic Examination    HPI: Abhi Redman is a 76 y o   who presents for annual gynecologic examination  She has the following concerns:  Pt reports some vaginal odor and some itching  Health Maintenance:    Patient describes her health as excellent  Patient has weight concerns  She doesn't get regular exercise  Does do some back stretches  She does wear her seatbelt routinely  She does perform regular monthly self breast exams  She does feel safe at home  Patients does follow a low fat diet  Patient gets 1 servings of dairy or calcium rich foods daily        Last pap: 2016  Last mammogram:   Last colorectal cancer screening: FIT testing 2019  Last DEXA: 2018    Patient Active Problem List   Diagnosis    Chronic lower back pain    Chronic right shoulder pain    GERD without esophagitis    Hyperlipidemia    Nephrolithiasis    Sciatica of left side    Vitamin D deficiency    Primary osteoarthritis involving multiple joints       Past Medical History:   Diagnosis Date    Abnormal electrocardiogram     last assessed 1/8/16    Chronic kidney disease     GERD (gastroesophageal reflux disease)     Kidney stone     Primary osteoarthritis involving multiple joints 11/7/2019       Past Surgical History:   Procedure Laterality Date    EYE SURGERY      TONSILLECTOMY      TOOTH EXTRACTION      TUBAL LIGATION         Past OB/Gyn History:    Patient is not currently sexually active    heterosexual     Family History   Problem Relation Age of Onset    Other Mother         sheehans syndrome    Aneurysm Father     Nephrolithiasis Father     Hypertension Sister     Cirrhosis Brother         alcoholic liver    No Known Problems Daughter     No Known Problems Sister     No Known Problems Daughter     No Known Problems Daughter     Other Daughter     No Known Problems Maternal Aunt     No Known Problems Maternal Grandmother     No Known Problems Paternal Grandmother        Social History:   Social History     Socioeconomic History    Marital status: /Civil Union     Spouse name: Not on file    Number of children: Not on file    Years of education: Not on file    Highest education level: Not on file   Occupational History    Occupation: Retired   Social Needs    Financial resource strain: Not on file    Food insecurity     Worry: Not on file     Inability: Not on file   [employer] needs     Medical: Not on file     Non-medical: Not on file   Tobacco Use    Smoking status: Former Smoker    Smokeless tobacco: Never Used   Substance and Sexual Activity    Alcohol use: Yes     Comment: occasional    Drug use: No    Sexual activity: Not Currently   Lifestyle    Physical activity     Days per week: Not on file     Minutes per session: Not on file    Stress: Not on file   Relationships    Social connections     Talks on phone: Not on file     Gets together: Not on file     Attends Bahai service: Not on file     Active member of club or organization: Not on file     Attends meetings of clubs or organizations: Not on file     Relationship status: Not on file    Intimate partner violence     Fear of current or ex partner: Not on file     Emotionally abused: Not on file     Physically abused: Not on file     Forced sexual activity: Not on file   Other Topics Concern    Not on file   Social History Narrative    RETIRED     Presently lives alone  Patient is currently retired      Allergies   Allergen Reactions    Flu Virus Vaccine     Furosemide          Current Outpatient Medications:     B Complex Vitamins (VITAMIN B COMPLEX 100 IJ), vitamin B complex, Disp: , Rfl:     cholecalciferol (VITAMIN D) 400 units/mL, Vitamin D, Disp: , Rfl:     Coenzyme Q10 (COQ-10) 10 MG CAPS, CoQ-10, Disp: , Rfl:     ibuprofen (MOTRIN) 600 mg tablet, Take 1 tablet (600 mg total) by mouth every 6 (six) hours as needed for mild pain, Disp: 60 tablet, Rfl: 3    olopatadine (PATANOL) 0 1 % ophthalmic solution, Administer 1 drop to both eyes 2 (two) times a day, Disp: 5 mL, Rfl: 0    Omega-3 Fatty Acids (FISH OIL) 1,000 mg, Fish Oil, Disp: , Rfl:     pyridoxine (VITAMIN B6) 100 mg tablet, Vitamin B-6, Disp: , Rfl:     triamcinolone (KENALOG) 0 1 % cream, triamcinolone acetonide 0 1 % topical cream, Disp: , Rfl:     Alfalfa 250 MG TABS, alfalfa, Disp: , Rfl:     metroNIDAZOLE (METROGEL) 0 75 % vaginal gel, Insert 1 application into the vagina daily for 5 days, Disp: 70 g, Rfl: 0    nystatin-triamcinolone (MYCOLOG-II) ointment, Apply topically 2 (two) times a day, Disp: 30 g, Rfl: 0    Review of Systems  Constitutional :no fever, feels well, no tiredness, no recent weight gain or loss  ENT: no ear ache, no loss of hearing, no nosebleeds or nasal discharge, no sore throat or hoarseness  Cardiovascular: no complaints of slow or fast heart beat, no chest pain, no palpitations, no leg claudication or lower extremity edema  Respiratory: no complaints of shortness of shortness of breath, no JARVIS  Breasts:no complaints of breast pain, breast lump, or nipple discharge  Gastrointestinal: no complaints of abdominal pain, constipation, nausea, vomiting, or diarrhea or bloody stools  Genitourinary : no complaints of dysuria, incontinence, pelvic pain, no dysmenorrhea, vaginal discharge or abnormal vaginal bleeding and as noted in HPI  Musculoskeletal: no complaints of arthralgia, no myalgia, no joint swelling or stiffness, no limb pain or swelling  Integumentary: no complaints of skin rash or lesion, itching or dry skin  Neurological: no complaints of headache, no confusion, no numbness or tingling, no dizziness or fainting     Physical Exam:   /72   Ht 4' 11 5" (1 511 m)   Wt 79 6 kg (175 lb 6 4 oz)   BMI 34 83 kg/m²     General: appears stated age, cooperative, alert normal mood and affect   Psychiatric oriented to person, place and time  Mood and affect normal   Neck: normal, supple,trachea midline, no masses    Thyroid: normal, no thyromegaly   Heart: regular rate and rhythm, S1, S2 normal, no murmur, click, rub or gallop   Lungs: clear to auscultation bilaterally, no increased work of breathing or signs of respiratory distress   Breasts: normal, no dimpling or skin changes noted   Abdomen: soft, non-tender, without masses or organomegaly   Vulva: hypopigmentation along labia minora b/l   Vagina: normal , no lesions, mod atrophy   Urethra: normal   Urethal meatus normal   Bladder Normal, soft, non-tender and no prolapse or masses appreciated   Cervix: normal, no palpable masses    Uterus: normal , non-tender, not enlarged, no palpable masses   Adnexa: normal, non-tender without fullness or masses   Lymphatic Palpation of lymph nodes in neck, axilla, groin and/or other locations: no lymphadenopathy or masses noted   Skin Normal skin turgor and no rashes    Palpation of skin and subcutaneous tissue normal

## 2021-02-17 NOTE — TELEPHONE ENCOUNTER
[address] calling in regards to [facility] they need specifics for how much per application is needed  Please review and call

## 2021-02-18 NOTE — ED PROVIDER NOTES
History  Chief Complaint   Patient presents with    Eye Swelling     itchy, started last night     20-year-old female presents to the emergency department complaining of bilateral eye itching and eyelid swelling  She states this has been ongoing for several weeks and she follows with ophthalmology regularly however it flared up again last night  She states that she believes she may be allergic to the lenses that they placed in her eyes as part of her cataract surgery  She has trailed ketotifen drops and artificial tears for symptoms with minimal relief  allergies reviewed           Prior to Admission Medications   Prescriptions Last Dose Informant Patient Reported? Taking?    Alfalfa 250 MG TABS   Yes No   Sig: alfalfa   B Complex Vitamins (VITAMIN B COMPLEX 100 IJ)   Yes No   Sig: vitamin B complex   Coenzyme Q10 (COQ-10) 10 MG CAPS   Yes No   Sig: CoQ-10   Omega-3 Fatty Acids (FISH OIL) 1,000 mg   Yes No   Sig: Fish Oil   cholecalciferol (VITAMIN D) 400 units/mL   Yes No   Sig: Vitamin D   ibuprofen (MOTRIN) 600 mg tablet   No No   Sig: Take 1 tablet (600 mg total) by mouth every 6 (six) hours as needed for mild pain   pyridoxine (VITAMIN B6) 100 mg tablet   Yes No   Sig: Vitamin B-6   triamcinolone (KENALOG) 0 1 % cream   Yes No   Sig: triamcinolone acetonide 0 1 % topical cream      Facility-Administered Medications: None       Past Medical History:   Diagnosis Date    Abnormal electrocardiogram     last assessed 1/8/16    Chronic kidney disease     GERD (gastroesophageal reflux disease)     Kidney stone     Primary osteoarthritis involving multiple joints 11/7/2019       Past Surgical History:   Procedure Laterality Date    EYE SURGERY      TONSILLECTOMY      TOOTH EXTRACTION      TUBAL LIGATION         Family History   Problem Relation Age of Onset    Other Mother         sheehans syndrome    Aneurysm Father     Nephrolithiasis Father     Hypertension Sister     Cirrhosis Brother alcoholic liver    No Known Problems Daughter     No Known Problems Sister     No Known Problems Daughter     No Known Problems Daughter     Other Daughter     No Known Problems Maternal Aunt     No Known Problems Maternal Grandmother     No Known Problems Paternal Grandmother      I have reviewed and agree with the history as documented  E-Cigarette/Vaping    E-Cigarette Use Never User      E-Cigarette/Vaping Substances    Nicotine No     THC No     CBD No     Flavoring No     Other No     Unknown No      Social History     Tobacco Use    Smoking status: Former Smoker    Smokeless tobacco: Never Used   Substance Use Topics    Alcohol use: Yes     Comment: occasional    Drug use: No       Review of Systems   Constitutional: Negative for chills, fatigue and fever  HENT: Negative for congestion, ear pain, rhinorrhea, sinus pressure, sneezing, sore throat and trouble swallowing  Eyes: Positive for discharge ( tears) and itching  Respiratory: Negative for cough, chest tightness, shortness of breath, wheezing and stridor  Cardiovascular: Negative for chest pain and palpitations  Gastrointestinal: Negative for abdominal pain, diarrhea, nausea and vomiting  Neurological: Negative for dizziness, syncope, numbness and headaches  All other systems reviewed and are negative  Physical Exam  Physical Exam  Vitals signs and nursing note reviewed  Constitutional:       General: She is not in acute distress  Appearance: Normal appearance  She is well-developed and well-groomed  She is not diaphoretic  HENT:      Head: Normocephalic and atraumatic  Right Ear: External ear normal       Left Ear: External ear normal       Nose: Nose normal    Eyes:      General: Lids are everted, no foreign bodies appreciated  Vision grossly intact  Gaze aligned appropriately  Extraocular Movements: Extraocular movements intact        Conjunctiva/sclera: Conjunctivae normal       Right eye: Chemosis present  Pupils: Pupils are equal, round, and reactive to light  Right eye: No corneal abrasion or fluorescein uptake  Left eye: No corneal abrasion or fluorescein uptake  Comments:  Puffy eyelids bilaterally  No evidence of foreign body  Excessive tear production  Neck:      Musculoskeletal: Normal range of motion  Cardiovascular:      Rate and Rhythm: Normal rate and regular rhythm  Heart sounds: Normal heart sounds  No murmur  No friction rub  No gallop  Pulmonary:      Effort: Pulmonary effort is normal  No respiratory distress  Breath sounds: Normal breath sounds  No stridor  No wheezing or rales  Abdominal:      General: Bowel sounds are normal  There is no distension  Palpations: Abdomen is soft  Tenderness: There is no abdominal tenderness  There is no guarding  Musculoskeletal: Normal range of motion  General: No tenderness  Skin:     General: Skin is warm  Capillary Refill: Capillary refill takes less than 2 seconds  Neurological:      Mental Status: She is alert and oriented to person, place, and time  Psychiatric:         Behavior: Behavior is cooperative           Vital Signs  ED Triage Vitals [02/14/21 1210]   Temperature Pulse Respirations Blood Pressure SpO2   97 6 °F (36 4 °C) 94 18 (!) 182/76 98 %      Temp Source Heart Rate Source Patient Position - Orthostatic VS BP Location FiO2 (%)   Temporal Monitor Sitting Left arm --      Pain Score       --           Vitals:    02/14/21 1210   BP: (!) 182/76   Pulse: 94   Patient Position - Orthostatic VS: Sitting         Visual Acuity      ED Medications  Medications   tetracaine 0 5 % ophthalmic solution 1 drop (1 drop Both Eyes Given 2/14/21 1312)   fluorescein sodium sterile ophthalmic strip 1 strip (1 strip Both Eyes Given 2/14/21 1312)       Diagnostic Studies  Results Reviewed     None                 No orders to display              Procedures  Procedures         ED Course                             SBIRT 20yo+      Most Recent Value   SBIRT (24 yo +)   In order to provide better care to our patients, we are screening all of our patients for alcohol and drug use  Would it be okay to ask you these screening questions? Yes Filed at: 02/14/2021 1220   Initial Alcohol Screen: US AUDIT-C    1  How often do you have a drink containing alcohol?  0 Filed at: 02/14/2021 1220   2  How many drinks containing alcohol do you have on a typical day you are drinking? 0 Filed at: 02/14/2021 1220   3a  Male UNDER 65: How often do you have five or more drinks on one occasion? 0 Filed at: 02/14/2021 1220   3b  FEMALE Any Age, or MALE 65+: How often do you have 4 or more drinks on one occassion? 0 Filed at: 02/14/2021 1220   Audit-C Score  0 Filed at: 02/14/2021 1220   CHRISTOPHER: How many times in the past year have you    Used an illegal drug or used a prescription medication for non-medical reasons? Never Filed at: 02/14/2021 1220                    MDM  Number of Diagnoses or Management Options  Itchy eyes: new and requires workup  Swelling of eyelid: new and requires workup  Diagnosis management comments:   Eye exam unremarkable with the exception of eyelid swelling  No evidence of infection  No evidence of corneal abrasion  Recommend close follow-up with ophthalmology  Patient prescribed antihistamine eyedrops for symptomatic relief  Patient educated regarding their diagnosis and given return and follow-up instructions  Patient was advised to returned to the ED with worsening symptoms or concerns  Patient is understanding of and in agreement with the treatment plan  There are no questions at the time of discharge      Risk of Complications, Morbidity, and/or Mortality  Presenting problems: low  Diagnostic procedures: low  Management options: low    Patient Progress  Patient progress: stable      Disposition  Final diagnoses:   Itchy eyes   Swelling of eyelid     Time reflects when diagnosis was documented in both MDM as applicable and the Disposition within this note     Time User Action Codes Description Comment    2/14/2021  1:37 PM Abbey Guzman Add [R68 89] Itchy eyes     2/14/2021  1:37 PM Abbey Guzman Add [H02 [id number]] Swelling of eyelid       ED Disposition     ED Disposition Condition Date/Time Comment    Discharge Stable Sun Feb 14, 2021  1:37 PM Adline Frankel discharge to home/self care  Follow-up Information     Follow up With Specialties Details Why Contact Info    [facility]   [address] [place] [address] Bl  [address]  [phone]            Discharge Medication List as of 2/14/2021  1:38 PM      START taking these medications    Details   olopatadine (PATANOL) 0 1 % ophthalmic solution Administer 1 drop to both eyes 2 (two) times a day, Starting Sun 2/14/2021, Normal         CONTINUE these medications which have NOT CHANGED    Details   [place] 250 MG TABS alfalfa, Historical Med      B Complex Vitamins (VITAMIN B COMPLEX 100 IJ) vitamin B complex, Historical Med      cholecalciferol (VITAMIN D) 400 units/mL Vitamin D, Historical Med      Coenzyme Q10 (COQ-10) 10 MG CAPS CoQ-10, Historical Med      ibuprofen (MOTRIN) 600 mg tablet Take 1 tablet (600 mg total) by mouth every 6 (six) hours as needed for mild pain, Starting u 11/7/2019, Normal      Omega-3 Fatty Acids (FISH OIL) 1,000 mg Fish Oil, Historical Med      pyridoxine (VITAMIN B6) 100 mg tablet Vitamin B-6, Historical Med      triamcinolone (KENALOG) 0 1 % cream triamcinolone acetonide 0 1 % topical cream, Historical Med           No discharge procedures on file      PDMP Review     None          ED Provider  Electronically Signed by           Milady Paiz PA-C  02/17/21 2050

## 2021-02-19 DIAGNOSIS — K21.9 GERD WITHOUT ESOPHAGITIS: Primary | ICD-10-CM

## 2021-02-19 DIAGNOSIS — T14.8XXA BRUISING: ICD-10-CM

## 2021-02-25 ENCOUNTER — TELEPHONE (OUTPATIENT)
Dept: ADMINISTRATIVE | Facility: HOSPITAL | Age: 76
End: 2021-02-25

## 2021-02-26 ENCOUNTER — CONSULT (OUTPATIENT)
Dept: GASTROENTEROLOGY | Facility: CLINIC | Age: 76
End: 2021-02-26

## 2021-02-26 ENCOUNTER — CONSULT (OUTPATIENT)
Dept: VASCULAR SURGERY | Facility: CLINIC | Age: 76
End: 2021-02-26
Payer: COMMERCIAL

## 2021-02-26 VITALS
BODY MASS INDEX: 36.53 KG/M2 | HEIGHT: 58 IN | TEMPERATURE: 98.6 F | HEART RATE: 70 BPM | SYSTOLIC BLOOD PRESSURE: 120 MMHG | WEIGHT: 174 LBS | DIASTOLIC BLOOD PRESSURE: 76 MMHG

## 2021-02-26 VITALS
SYSTOLIC BLOOD PRESSURE: 118 MMHG | DIASTOLIC BLOOD PRESSURE: 78 MMHG | WEIGHT: 174.8 LBS | HEART RATE: 68 BPM | TEMPERATURE: 98.4 F | RESPIRATION RATE: 16 BRPM | BODY MASS INDEX: 36.69 KG/M2 | HEIGHT: 58 IN

## 2021-02-26 DIAGNOSIS — R10.13 EPIGASTRIC PAIN: Primary | ICD-10-CM

## 2021-02-26 DIAGNOSIS — T14.8XXA BRUISING: ICD-10-CM

## 2021-02-26 DIAGNOSIS — E78.2 MIXED HYPERLIPIDEMIA: ICD-10-CM

## 2021-02-26 DIAGNOSIS — K21.9 GERD WITHOUT ESOPHAGITIS: ICD-10-CM

## 2021-02-26 DIAGNOSIS — R68.81 EARLY SATIETY: ICD-10-CM

## 2021-02-26 DIAGNOSIS — I87.2 VENOUS INSUFFICIENCY OF BOTH LOWER EXTREMITIES: Primary | ICD-10-CM

## 2021-02-26 PROCEDURE — 99203 OFFICE O/P NEW LOW 30 MIN: CPT | Performed by: INTERNAL MEDICINE

## 2021-02-26 PROCEDURE — 99204 OFFICE O/P NEW MOD 45 MIN: CPT | Performed by: PHYSICIAN ASSISTANT

## 2021-02-26 PROCEDURE — 1036F TOBACCO NON-USER: CPT | Performed by: PHYSICIAN ASSISTANT

## 2021-02-26 PROCEDURE — 1160F RVW MEDS BY RX/DR IN RCRD: CPT | Performed by: PHYSICIAN ASSISTANT

## 2021-02-26 NOTE — PATIENT INSTRUCTIONS
(1) Stop taking Advil and Aleve  (2) Take Tylenol instead (up to 4 pills/day)  (3) Re-start Protonix  (4) Think about EGD  (5) Return in 2 months

## 2021-02-26 NOTE — PROGRESS NOTES
[facility] - Outpatient Consultation  Raffi Cadet 76 y o  female MRN: [id number]  Encounter: [id number]    HPI:    Raffi Cadet is a 76 y o  female who presents with complaint of epigastric discomfort and early satiety  She developed epigastric burning and early satiety several weeks ago  She has history of GERD and used to take pantoprazole, but stopped 2 years ago because she does not like taking pills  She now takes occasional papaya enzymes to manage GERD and rarely has heartburn  She also reports that she has been taking ibuprofen and Aleve daily for the past several weeks and this coincided with the onset of her GI symptoms  She takes these medications for HAs and back pain  There is no FH of gastric or esophageal cancer  She quit smoking 20+ years ago  She is a retired mental health worker  Last CBC in 11/2019 - no anemia  FOBT negative in 2019  REVIEW OF SYSTEMS:  CONSTITUTIONAL: Denies any fever, chills, rigors, and weight loss  HEENT: No earache or tinnitus  Denies hearing loss or visual disturbances  CARDIOVASCULAR: No chest pain or palpitations  RESPIRATORY: Denies any cough, hemoptysis, shortness of breath or dyspnea on exertion  GASTROINTESTINAL: As noted in the History of Present Illness  GENITOURINARY: No problems with urination  Denies any hematuria or dysuria  NEUROLOGIC: No dizziness or vertigo, denies headaches  MUSCULOSKELETAL: Denies any muscle or joint pain  SKIN: Denies skin rashes or itching  ENDOCRINE: Denies excessive thirst  Denies intolerance to heat or cold  PSYCHOSOCIAL: Denies depression or anxiety  Denies any recent memory loss       Historical Information   Past Medical History:   Diagnosis Date    Abnormal electrocardiogram     last assessed 1/8/16    Chronic kidney disease     GERD (gastroesophageal reflux disease)     Kidney stone     Primary osteoarthritis involving multiple joints 11/7/2019 Past Surgical History:   Procedure Laterality Date    EYE SURGERY      TONSILLECTOMY      TOOTH EXTRACTION      TUBAL LIGATION       Social History   Social History     Substance and Sexual Activity   Alcohol Use Yes    Comment: occasional     Social History     Substance and Sexual Activity   Drug Use No     Social History     Tobacco Use   Smoking Status Former Smoker   Smokeless Tobacco Never Used     Family History   Problem Relation Age of Onset    Other Mother         sheehans syndrome    Aneurysm Father     Nephrolithiasis Father     Hypertension Sister     Cirrhosis Brother         alcoholic liver    No Known Problems Daughter     No Known Problems Sister     No Known Problems Daughter     No Known Problems Daughter     Other Daughter     No Known Problems Maternal Aunt     No Known Problems Maternal Grandmother     No Known Problems Paternal Grandmother        Meds/Allergies       Current Outpatient Medications:     Alfalfa 250 MG TABS    B Complex Vitamins (VITAMIN B COMPLEX 100 IJ)    cholecalciferol (VITAMIN D) 400 units/mL    Coenzyme Q10 (COQ-10) 10 MG CAPS    ibuprofen (MOTRIN) 600 mg tablet    nystatin-triamcinolone (MYCOLOG-II) ointment    olopatadine (PATANOL) 0 1 % ophthalmic solution    Omega-3 Fatty Acids (FISH OIL) 1,000 mg    pyridoxine (VITAMIN B6) 100 mg tablet    triamcinolone (KENALOG) 0 1 % cream    Allergies   Allergen Reactions    Flu Virus Vaccine     Furosemide        Objective   Blood pressure 118/78, pulse 68, temperature 98 4 °F (36 9 °C), resp  rate 16, height 4' 10" (1 473 m), weight 79 3 kg (174 lb 12 8 oz)  Body mass index is 36 53 kg/m²  PHYSICAL EXAM:    General Appearance:   Alert, cooperative, no distress   HEENT:   Normocephalic, atraumatic, anicteric       Neck:  Supple, symmetrical, trachea midline   Lungs:   Clear to auscultation bilaterally; no rales, rhonchi or wheezing; respirations unlabored    Heart[de-identified]   Regular rate and rhythm; no murmur, rub, or gallop  Abdomen:   Soft, mild epigastric ttp, non-distended; normal bowel sounds; no masses, no organomegaly    Genitalia:   Deferred    Rectal:   Deferred    Extremities:  No cyanosis, clubbing or edema    Pulses:  2+ and symmetric    Skin:  No jaundice, rashes, or lesions    Lymph nodes:  No palpable cervical lymphadenopathy        Lab Results:   No visits with results within 1 Day(s) from this visit  Latest known visit with results is:   Orders Only on 12/14/2020   Component Date Value    SARS-CoV-2  12/14/2020 Not Detected        Lab Results   Component Value Date    WBC 5 48 11/11/2019    HGB 12 3 11/11/2019    HCT 39 9 11/11/2019    MCV 91 11/11/2019     11/11/2019       Lab Results   Component Value Date    SODIUM 140 11/11/2019    K 3 8 11/11/2019     11/11/2019    CO2 25 11/11/2019    AGAP 7 11/11/2019    BUN 19 11/11/2019    CREATININE 0 76 11/11/2019    GLUF 104 (H) 11/11/2019    CALCIUM 9 1 11/11/2019    AST 28 11/11/2019    ALT 26 11/11/2019    ALKPHOS 107 11/11/2019    TP 7 6 11/11/2019    TBILI 0 39 11/11/2019    EGFR 78 11/11/2019       Lab Results   Component Value Date    CRP 3 3 (H) 09/17/2019       Lab Results   Component Value Date    [id number] 2 830 11/11/2019       No results found for: IRON, TIBC, FERRITIN    Radiology Results:   No results found  ______________________________________________________________________  ASSESSMENT AND PLAN:    Netta Bains is a 76 y o  female who presents with complaint of epigastric pain and early satiety in the setting of recent NSAID use  I discussed for her my concern for PUD  We also talked about H  pylori infection or malignancy  Based on her age and symptoms, EGD is warranted  However, she is reluctant to schedule this now because he is worried about complications from the procedure  I reassured her and discussed the risks of EGD including bleeding, infection, and perforation     At this time, she would like to defer EGD and try the following:  - Stop NSAIDs  - Restart pantoprazole  - Return to the clinic in 2 months  If her symptoms persist, we will proceed with EGD  1  Epigastric pain    2  GERD without esophagitis    3  Early satiety        No orders of the defined types were placed in this encounter

## 2021-02-26 NOTE — ASSESSMENT & PLAN NOTE
77-year-old female former smoker with GERD, HLD, CKD presents on referral from PCP for isolated localized skin staining of superior to left medial malleolus without ulceration   -no vascular intervention further workup indicated  -recommend conservative managed to include daily use of prescription strength compression stockings, lower extremity elevation, low-sodium diet, aerobic activity, weight management and skin moisturization to promote skin health and prevent skin breakdown   -prescription provided for compression stocking  -return to office p r n   -instructed to contact the office with new symptoms or tissue loss

## 2021-02-26 NOTE — PATIENT INSTRUCTIONS
Venous Insufficiency   WHAT YOU NEED TO KNOW:   What is venous insufficiency? Venous insufficiency is a condition that prevents blood from flowing out of your legs and back to your heart  Veins contain valves that help blood flow in one direction  Venous insufficiency means the valves do not close correctly or fully  Blood flows back and pools in your leg  This can cause problems such as varicose veins  Venous insufficiency may also be called chronic venous insufficiency or venous stasis  What increases my risk for venous insufficiency? · A leg injury or blood clot    · Being a woman    · Pregnancy    · Older age    · A family history of varicose veins    · Smoking cigarettes    · Obesity, or not getting enough exercise  What are the signs and symptoms of venous insufficiency? · Visible veins on your legs that may be small and red or large, thick, and blue    · Swelling in your ankles or calves    · Changes in skin color, such as dark or purple skin    · An ulcer (open sore) on your leg    · Leg pain that is worse when you are menstruating (women) or when you stand, and better when you elevate your legs    · Burning or itching    · Cramps that happen at night    · Thick, hard skin on your legs and ankles    · Feeling of heaviness in your legs  How is venous insufficiency diagnosed? · Venous duplex imaging  is a procedure used to examine the blood flow through veins  A gel will be applied to your legs  Your healthcare provider will slide a small device called a transducer across the veins  The transducer is a microphone that helps your healthcare provider hear blood moving through the vein  · Contrast venography  is a procedure used to show the veins on x-ray pictures  A catheter is guided into the vein  Contrast liquid is injected into the catheter to help the veins show up better in the pictures   Tell the healthcare provider if you have ever had an allergic reaction to contrast liquid  · Plethysmography  is a procedure that may be used to find changes in blood pressure through your veins  You will wear a blood pressure cuff on your leg  Changes in pressure and the amount of blood that can circulate through your leg veins are measured  Pressures are measured while you stand, sit, and lift your leg  How is venous insufficiency treated? · Medicine  may be given to improve blood flow  The medicines may thin your blood or reduce swelling to help blood flow  You may also need medicine to treat a bacterial infection  · Ablation  is a procedure used to close varicose veins  A catheter is guided until it is near the vein  A device will then be guided to the area  The device may produce energy through radiofrequency or a laser  The energy creates heat that will close the blood vessel  · Sclerotherapy  is a procedure used to fade visible veins  Your healthcare provider will inject a liquid into a spider vein or varicose vein  The liquid causes irritation in the vein  The vein swells and sticks together  Your body will then absorb the vein  · Surgery  may be needed if other treatments do not work  Surgery may be used to repair a leg vein valve or to clip or tie off a vein so blood cannot flow through it  You may need to have a veins removed during surgery called stripping  Surgery may be used to bypass (go around) the damaged vein  Blood will flow through a vein transplanted from another part of your body  What can I do to manage my symptoms? · Wear pressure stockings as directed  Pressure stockings help keep blood from pooling in your leg veins  Your healthcare provider can prescribe stockings that are right for you  Do not buy over-the-counter pressure stockings unless your healthcare provider says it is okay  They may not fit correctly or may have elastic that cuts off your circulation   Ask your healthcare provider when to start wearing pressure stockings and how long to wear them each day  · Do not sit or stand for long periods of time  If you have to sit for a long time, flex and extend your legs, feet, and ankles  Do this about 10 times every 30 minutes to help keep blood flowing  If you have to stand for a long time, take breaks and sit with your legs elevated  · Elevate your legs  Elevate your legs above the level of your heart to reduce swelling  Your healthcare provider may recommend that you keep your legs elevated for 30 minutes at a time  You may need to do this 3 to 4 times per day, or more if your healthcare provider recommends  · Do not smoke  Nicotine and other chemicals in cigarettes and cigars can cause blood vessel damage  Ask your healthcare provider for information if you currently smoke and need help to quit  E-cigarettes or smokeless tobacco still contain nicotine  Talk to your healthcare provider before you use these products  · Reach or maintain a healthy weight  Extra weight can make venous insufficiency worse  Ask your healthcare provider how much you should weigh  He can help you create a weight loss plan if you need to lose weight  · Exercise as directed  Walking can help increase blood flow in your calves  Ask your healthcare provider how much exercise you need each day and which exercises are best for you  · Care for your skin  Keep your skin clean  Do not use any soaps or lotions that may dry your skin  For example, do not use products that contain fragrance or alcohol  If you have a skin ulcer, your healthcare provider may recommend a wet-to-dry bandage  To do this, apply a wet bandage to your wound and allow it to dry  This will help remove drainage from your wound each time you change the bandage  Your healthcare provider will tell you how often to change your bandage and which kind of bandage to use  Check your wound for signs of infection, such as swelling or pus  · Go to physical therapy (PT) as directed    A physical therapist can help you increase movement and range of motion in your legs  When should I seek immediate care? · You have a wound that does not heal or is infected  · You have an injury that has broken your skin and caused your varicose veins to bleed  · Your leg is swollen and hard  · You have pain in your leg that does not go away or gets worse  · Your legs or feet are turning blue or black  · Your leg feels warm, tender, and painful  It may look swollen and red  When should I contact my healthcare provider? · You have a fever  · You have varicose veins and they are painful  · You have new or worsening leg pain, swelling, or redness  · You have new or worsening ulcers or other sores on your leg  · You have questions or concerns about your condition or care  CARE AGREEMENT:   You have the right to help plan your care  Learn about your health condition and how it may be treated  Discuss treatment options with your caregivers to decide what care you want to receive  You always have the right to refuse treatment  The above information is an  only  It is not intended as medical advice for individual conditions or treatments  Talk to your doctor, nurse or pharmacist before following any medical regimen to see if it is safe and effective for you  © 2017 [facility] Information is for End User's use only and may not be sold, redistributed or otherwise used for commercial purposes  All illustrations and images included in CareNotes® are the copyrighted property of [employer] D [employer]  or Ed Fernandes  -continue conservative management with daily use of compression stockings, elevation of your legs, low-salt diet, maintaining a healthy weight, aerobic activity and lotion to the skin of your legs to help promote healthy skin and prevent skin breakdown   -place your compression stockings on in the am and remove prior to bed    You have been provided with a prescription   -return to office as needed  -please contact the office with new symptoms or concerns

## 2021-02-26 NOTE — PROGRESS NOTES
Assessment/Plan:    Venous insufficiency of both lower extremities  70-year-old female former smoker with GERD, HLD, CKD presents on referral from PCP for isolated localized skin staining of superior to left medial malleolus without ulceration   -no vascular intervention further workup indicated  -recommend conservative managed to include daily use of prescription strength compression stockings, lower extremity elevation, low-sodium diet, aerobic activity, weight management and skin moisturization to promote skin health and prevent skin breakdown   -prescription provided for compression stocking  -return to office p r n   -instructed to contact the office with new symptoms or tissue loss  Hyperlipidemia  -stable  -continue diet and lifestyle modification  -management per PCP       Diagnoses and all orders for this visit:    Venous insufficiency of both lower extremities  -     Compression Stocking    Bruising  -     Ambulatory referral to Vascular Surgery    Mixed hyperlipidemia          Subjective:      Patient ID: Maury Epsinal is a 76 y o  female  New patient, presenting today for evaluation of leg pain and discoloration  Patient reports some mild red blotchy marks on LLE  70-year-old female former smoker with GERD, HLD, CKD presents on referral from PCP for isolated localized skin staining of superior to left medial malleolus without ulceration  Patient reports a longstanding history of intermittent bilateral lower extremity edema, heaviness and pruritus which is not particularly bothersome to her  She denies history DVT, PE, hypercoagulable disorder, superficial thrombophlebitis, recurrent lower extremity cellulitis, stasis dermatitis, bleeding varicosities or venous ulcerations  She reports trauma to the left ankle from a  several years ago insists that time she has had chronic skin staining at the point of direct trauma  No associated ulceration, pain or edema    Patient does not wear compression stocking  The following portions of the patient's history were reviewed and updated as appropriate: allergies, current medications, past family history, past medical history, past social history, past surgical history and problem list     Review of Systems   Constitutional: Negative  HENT: Negative  Eyes: Negative  Respiratory: Negative  Cardiovascular: Positive for leg swelling  Gastrointestinal: Negative  Endocrine: Negative  Genitourinary: Negative  Musculoskeletal: Positive for joint swelling  Skin: Positive for color change  Allergic/Immunologic: Negative  Neurological: Negative  Hematological: Negative  Psychiatric/Behavioral: Negative  I have reviewed and made appropriate changes to the review of systems input by the medical assistant      Vitals:    02/26/21 1137   BP: 120/76   BP Location: Left arm   Patient Position: Sitting   Pulse: 70   Temp: 98 6 °F (37 °C)   TempSrc: Tympanic   Weight: 78 9 kg (174 lb)   Height: 4' 10" (1 473 m)       Patient Active Problem List   Diagnosis    Chronic lower back pain    Chronic right shoulder pain    GERD without esophagitis    Hyperlipidemia    Nephrolithiasis    Sciatica of left side    Vitamin D deficiency    Primary osteoarthritis involving multiple joints    Venous insufficiency of both lower extremities       Past Surgical History:   Procedure Laterality Date    EYE SURGERY      TONSILLECTOMY      TOOTH EXTRACTION      TUBAL LIGATION         Family History   Problem Relation Age of Onset    Other Mother         sheehans syndrome    Aneurysm Father     Nephrolithiasis Father     Hypertension Sister     Cirrhosis Brother         alcoholic liver    No Known Problems Daughter     No Known Problems Sister     No Known Problems Daughter     No Known Problems Daughter     Other Daughter     No Known Problems Maternal Aunt     No Known Problems Maternal Grandmother     No Known Problems Paternal Grandmother        Social History     Socioeconomic History    Marital status: /Civil Union     Spouse name: Not on file    Number of children: Not on file    Years of education: Not on file    Highest education level: Not on file   Occupational History    Occupation: Retired   Social Needs    Financial resource strain: Not on file    Food insecurity     Worry: Not on file     Inability: Not on file   [employer] needs     Medical: Not on file     Non-medical: Not on file   Tobacco Use    Smoking status: Former Smoker    Smokeless tobacco: Never Used   Substance and Sexual Activity    Alcohol use: Yes     Comment: occasional    Drug use: No    Sexual activity: Not Currently   Lifestyle    Physical activity     Days per week: Not on file     Minutes per session: Not on file    Stress: Not on file   Relationships    Social connections     Talks on phone: Not on file     Gets together: Not on file     Attends Gnosticist service: Not on file     Active member of club or organization: Not on file     Attends meetings of clubs or organizations: Not on file     Relationship status: Not on file    Intimate partner violence     Fear of current or ex partner: Not on file     Emotionally abused: Not on file     Physically abused: Not on file     Forced sexual activity: Not on file   Other Topics Concern    Not on file   Social History Narrative    RETIRED       Allergies   Allergen Reactions    Flu Virus Vaccine     Furosemide          Current Outpatient Medications:     Alfalfa 250 MG TABS, alfalfa, Disp: , Rfl:     B Complex Vitamins (VITAMIN B COMPLEX 100 IJ), vitamin B complex, Disp: , Rfl:     cholecalciferol (VITAMIN D) 400 units/mL, Vitamin D, Disp: , Rfl:     Coenzyme Q10 (COQ-10) 10 MG CAPS, CoQ-10, Disp: , Rfl:     ibuprofen (MOTRIN) 600 mg tablet, Take 1 tablet (600 mg total) by mouth every 6 (six) hours as needed for mild pain, Disp: 60 tablet, Rfl: 3   nystatin-triamcinolone (MYCOLOG-II) ointment, Apply topically 2 (two) times a day, Disp: 30 g, Rfl: 0    olopatadine (PATANOL) 0 1 % ophthalmic solution, Administer 1 drop to both eyes 2 (two) times a day, Disp: 5 mL, Rfl: 0    Omega-3 Fatty Acids (FISH OIL) 1,000 mg, Fish Oil, Disp: , Rfl:     pyridoxine (VITAMIN B6) 100 mg tablet, Vitamin B-6, Disp: , Rfl:     triamcinolone (KENALOG) 0 1 % cream, triamcinolone acetonide 0 1 % topical cream, Disp: , Rfl:     Objective:  Imaging study:  No vascular imaging studies for review    /76 (BP Location: Left arm, Patient Position: Sitting)   Pulse 70   Temp 98 6 °F (37 °C) (Tympanic)   Ht 4' 10" (1 473 m)   Wt 78 9 kg (174 lb)   BMI 36 37 kg/m²       RLE       Physical Exam  Vitals signs and nursing note reviewed  Constitutional:       General: She is not in acute distress  Appearance: Normal appearance  She is well-developed  HENT:      Head: Normocephalic and atraumatic  Eyes:      General: No scleral icterus  Conjunctiva/sclera: Conjunctivae normal       Pupils: Pupils are equal, round, and reactive to light  Neck:      Musculoskeletal: Normal range of motion and neck supple  Thyroid: No thyromegaly  Vascular: No carotid bruit or JVD  Trachea: No tracheal deviation  Cardiovascular:      Rate and Rhythm: Normal rate and regular rhythm  Pulses:           Carotid pulses are 2+ on the right side and 2+ on the left side  Radial pulses are 2+ on the right side and 2+ on the left side  Dorsalis pedis pulses are 2+ on the right side and 2+ on the left side  Posterior tibial pulses are 2+ on the right side and 2+ on the left side  Heart sounds: S1 normal and S2 normal  No murmur  No friction rub  No gallop  No S3 sounds  Comments: Bilateral lower extremities warm, pink, motor and sensory intact and well perfused without cyanosis, pallor, rubor, lipodermatosclerosis or tissue loss    For isolated chronic skin staining above left medial malleolus secondary to trauma without ulceration  Scattered reticular varicosities bilateral lower legs  Pulmonary:      Effort: No respiratory distress  Breath sounds: Normal breath sounds  No stridor  No wheezing, rhonchi or rales  Abdominal:      General: Bowel sounds are normal  There is no distension or abdominal bruit  Palpations: Abdomen is soft  There is no mass or pulsatile mass  Tenderness: There is no abdominal tenderness  There is no rebound  Musculoskeletal: Normal range of motion  General: No deformity  Right lower leg: No edema  Left lower leg: No edema  Skin:     General: Skin is warm and dry  Coloration: Skin is not pale  Findings: No erythema or lesion  Neurological:      General: No focal deficit present  Mental Status: She is alert and oriented to person, place, and time  Psychiatric:         Mood and Affect: Mood normal          Thought Content:  Thought content normal

## 2021-05-14 ENCOUNTER — OFFICE VISIT (OUTPATIENT)
Dept: INTERNAL MEDICINE CLINIC | Facility: CLINIC | Age: 76
End: 2021-05-14
Payer: COMMERCIAL

## 2021-05-14 VITALS
BODY MASS INDEX: 35.71 KG/M2 | OXYGEN SATURATION: 97 % | WEIGHT: 170.13 LBS | TEMPERATURE: 97.8 F | HEIGHT: 58 IN | HEART RATE: 73 BPM | SYSTOLIC BLOOD PRESSURE: 118 MMHG | DIASTOLIC BLOOD PRESSURE: 72 MMHG

## 2021-05-14 DIAGNOSIS — Z00.00 MEDICARE ANNUAL WELLNESS VISIT, SUBSEQUENT: ICD-10-CM

## 2021-05-14 DIAGNOSIS — Z00.8 ENCOUNTER FOR OTHER GENERAL EXAMINATION: ICD-10-CM

## 2021-05-14 DIAGNOSIS — M54.50 CHRONIC LOW BACK PAIN, UNSPECIFIED BACK PAIN LATERALITY, UNSPECIFIED WHETHER SCIATICA PRESENT: ICD-10-CM

## 2021-05-14 DIAGNOSIS — N76.0 VULVOVAGINITIS: ICD-10-CM

## 2021-05-14 DIAGNOSIS — K21.9 GERD WITHOUT ESOPHAGITIS: ICD-10-CM

## 2021-05-14 DIAGNOSIS — Z13.1 SCREENING FOR DIABETES MELLITUS (DM): ICD-10-CM

## 2021-05-14 DIAGNOSIS — G89.29 CHRONIC LOW BACK PAIN, UNSPECIFIED BACK PAIN LATERALITY, UNSPECIFIED WHETHER SCIATICA PRESENT: ICD-10-CM

## 2021-05-14 DIAGNOSIS — G89.29 CHRONIC RIGHT SHOULDER PAIN: ICD-10-CM

## 2021-05-14 DIAGNOSIS — E78.2 MIXED HYPERLIPIDEMIA: Primary | ICD-10-CM

## 2021-05-14 DIAGNOSIS — M15.9 PRIMARY OSTEOARTHRITIS INVOLVING MULTIPLE JOINTS: ICD-10-CM

## 2021-05-14 DIAGNOSIS — Z13.29 SCREENING FOR THYROID DISORDER: ICD-10-CM

## 2021-05-14 DIAGNOSIS — M25.511 CHRONIC RIGHT SHOULDER PAIN: ICD-10-CM

## 2021-05-14 DIAGNOSIS — Z13.0 SCREENING FOR DEFICIENCY ANEMIA: ICD-10-CM

## 2021-05-14 DIAGNOSIS — E55.9 VITAMIN D DEFICIENCY: ICD-10-CM

## 2021-05-14 DIAGNOSIS — Z13.220 SCREENING FOR LIPID DISORDERS: ICD-10-CM

## 2021-05-14 DIAGNOSIS — I87.2 VENOUS INSUFFICIENCY OF BOTH LOWER EXTREMITIES: ICD-10-CM

## 2021-05-14 PROCEDURE — 3725F SCREEN DEPRESSION PERFORMED: CPT | Performed by: INTERNAL MEDICINE

## 2021-05-14 PROCEDURE — 1160F RVW MEDS BY RX/DR IN RCRD: CPT | Performed by: INTERNAL MEDICINE

## 2021-05-14 PROCEDURE — 1036F TOBACCO NON-USER: CPT | Performed by: INTERNAL MEDICINE

## 2021-05-14 PROCEDURE — 1170F FXNL STATUS ASSESSED: CPT | Performed by: INTERNAL MEDICINE

## 2021-05-14 PROCEDURE — G0439 PPPS, SUBSEQ VISIT: HCPCS | Performed by: INTERNAL MEDICINE

## 2021-05-14 PROCEDURE — 3288F FALL RISK ASSESSMENT DOCD: CPT | Performed by: INTERNAL MEDICINE

## 2021-05-14 PROCEDURE — 1125F AMNT PAIN NOTED PAIN PRSNT: CPT | Performed by: INTERNAL MEDICINE

## 2021-05-14 PROCEDURE — 99214 OFFICE O/P EST MOD 30 MIN: CPT | Performed by: INTERNAL MEDICINE

## 2021-05-14 RX ORDER — PANTOPRAZOLE SODIUM 20 MG/1
20 TABLET, DELAYED RELEASE ORAL DAILY
COMMUNITY
End: 2022-07-13 | Stop reason: SDUPTHER

## 2021-05-14 RX ORDER — NYSTATIN AND TRIAMCINOLONE ACETONIDE 100000; 1 [USP'U]/G; MG/G
OINTMENT TOPICAL 2 TIMES DAILY
Qty: 30 G | Refills: 3 | Status: SHIPPED | OUTPATIENT
Start: 2021-05-14 | End: 2022-07-13

## 2021-05-14 NOTE — PROGRESS NOTES
BMI Counseling: Body mass index is 35 56 kg/m²  The BMI is above normal  Nutrition recommendations include decreasing portion sizes and encouraging healthy choices of fruits and vegetables  Exercise recommendations include moderate physical activity 150 minutes/week  No pharmacotherapy was ordered  Assessment/Plan:  Problem List Items Addressed This Visit        Digestive    GERD without esophagitis    Relevant Medications    pantoprazole (PROTONIX) 20 mg tablet       Cardiovascular and Mediastinum    Venous insufficiency of both lower extremities    Relevant Orders    CBC and differential       Musculoskeletal and Integument    Primary osteoarthritis involving multiple joints    Relevant Orders    CBC and differential       Other    Vitamin D deficiency    Relevant Orders    Vitamin D 25 hydroxy    Hyperlipidemia - Primary    Relevant Orders    Comprehensive metabolic panel    Lipid Panel with Direct LDL reflex    TSH, 3rd generation with Free T4 reflex    Chronic right shoulder pain    Chronic lower back pain      Other Visit Diagnoses     Medicare annual wellness visit, subsequent        Encounter for other general examination        Screening for lipid disorders        Screening for diabetes mellitus (DM)        Screening for thyroid disorder        Screening for deficiency anemia        Relevant Orders    CBC and differential    Vulvovaginitis        Relevant Medications    metroNIDAZOLE (METROCREAM) 0 75 % cream    nystatin-triamcinolone (MYCOLOG-II) ointment           Diagnoses and all orders for this visit:    Mixed hyperlipidemia  -     Comprehensive metabolic panel; Future  -     Lipid Panel with Direct LDL reflex; Future  -     TSH, 3rd generation with Free T4 reflex;  Future    Medicare annual wellness visit, subsequent    GERD without esophagitis    Venous insufficiency of both lower extremities  -     CBC and differential; Future    Primary osteoarthritis involving multiple joints  -     CBC and differential; Future    Chronic low back pain, unspecified back pain laterality, unspecified whether sciatica present    Chronic right shoulder pain    Vitamin D deficiency  -     Vitamin D 25 hydroxy; Future    Encounter for other general examination    Screening for lipid disorders    Screening for diabetes mellitus (DM)    Screening for thyroid disorder    Screening for deficiency anemia  -     CBC and differential; Future    Vulvovaginitis  -     metroNIDAZOLE (METROCREAM) 0 75 % cream; Apply topically 2 (two) times a day  -     nystatin-triamcinolone (MYCOLOG-II) ointment; Apply topically 2 (two) times a day    Other orders  -     pantoprazole (PROTONIX) 20 mg tablet; Take 20 mg by mouth daily        No problem-specific Assessment & Plan notes found for this encounter  A/P Doing well and will check labs  Discussed BMI and will give information on diet and exercise  Discussed vaccines and defers  Continue current treatment and RTC six months for routine  Subjective:      Patient ID: Shena Valadez is a 76 y o  female  HF RTC for f/u hyperlipidemia, GERD, etc  Doing well and no new issues  Remains active w/o difficulty and no falls  Chronic pain is controlled  No reflux  Due for labs and vaccines  The following portions of the patient's history were reviewed and updated as appropriate:   She has a past medical history of Abnormal electrocardiogram, Chronic kidney disease, GERD (gastroesophageal reflux disease), Kidney stone, and Primary osteoarthritis involving multiple joints (11/7/2019)  ,  does not have any pertinent problems on file  ,   has a past surgical history that includes Tubal ligation; Tonsillectomy; Tooth extraction; and Eye surgery  ,  family history includes Aneurysm in her father; Cirrhosis in her brother; Hypertension in her sister; Nephrolithiasis in her father; No Known Problems in her daughter, daughter, daughter, maternal aunt, maternal grandmother, paternal grandmother, and sister; Other in her daughter and mother  ,   reports that she has quit smoking  She has never used smokeless tobacco  She reports current alcohol use  She reports that she does not use drugs  ,  is allergic to flu virus vaccine and furosemide     Current Outpatient Medications   Medication Sig Dispense Refill    B Complex Vitamins (VITAMIN B COMPLEX 100 IJ) vitamin B complex      cholecalciferol (VITAMIN D) 400 units/mL Vitamin D      Coenzyme Q10 (COQ-10) 10 MG CAPS CoQ-10      ibuprofen (MOTRIN) 600 mg tablet Take 1 tablet (600 mg total) by mouth every 6 (six) hours as needed for mild pain 60 tablet 3    nystatin-triamcinolone (MYCOLOG-II) ointment Apply topically 2 (two) times a day 30 g 0    olopatadine (PATANOL) 0 1 % ophthalmic solution Administer 1 drop to both eyes 2 (two) times a day 5 mL 0    Omega-3 Fatty Acids (FISH OIL) 1,000 mg Fish Oil      pantoprazole (PROTONIX) 20 mg tablet Take 20 mg by mouth daily      Alfalfa 250 MG TABS alfalfa      metroNIDAZOLE (METROCREAM) 0 75 % cream Apply topically 2 (two) times a day 45 g 5    nystatin-triamcinolone (MYCOLOG-II) ointment Apply topically 2 (two) times a day 30 g 3    pyridoxine (VITAMIN B6) 100 mg tablet Vitamin B-6      triamcinolone (KENALOG) 0 1 % cream triamcinolone acetonide 0 1 % topical cream       No current facility-administered medications for this visit  Review of Systems   Constitutional: Negative for activity change, chills, diaphoresis, fatigue and fever  HENT: Negative  Eyes: Negative for visual disturbance  Respiratory: Negative for cough, chest tightness, shortness of breath and wheezing  Cardiovascular: Negative for chest pain, palpitations and leg swelling  Gastrointestinal: Negative for abdominal pain, constipation, diarrhea, nausea and vomiting  Endocrine: Negative for cold intolerance and heat intolerance  Genitourinary: Negative for difficulty urinating, dysuria and frequency     Musculoskeletal: Negative for arthralgias, gait problem and myalgias  Neurological: Negative for dizziness, seizures, syncope, weakness, light-headedness and headaches  Psychiatric/Behavioral: Negative for confusion, dysphoric mood and sleep disturbance  The patient is not nervous/anxious  PHQ-9 Depression Screening    PHQ-9:   Frequency of the following problems over the past two weeks:      Little interest or pleasure in doing things: 0 - not at all  Feeling down, depressed, or hopeless: 0 - not at all  PHQ-2 Score: 0        Objective:  Vitals:    05/14/21 1436   BP: 118/72   Pulse: 73   Temp: 97 8 °F (36 6 °C)   SpO2: 97%   Weight: 77 2 kg (170 lb 2 oz)   Height: 4' 10" (1 473 m)     Body mass index is 35 56 kg/m²  Physical Exam  Vitals signs and nursing note reviewed  Constitutional:       General: She is not in acute distress  Appearance: Normal appearance  She is not ill-appearing  HENT:      Head: Normocephalic and atraumatic  Mouth/Throat:      Mouth: Mucous membranes are moist    Eyes:      Extraocular Movements: Extraocular movements intact  Conjunctiva/sclera: Conjunctivae normal       Pupils: Pupils are equal, round, and reactive to light  Neck:      Musculoskeletal: Neck supple  Vascular: No carotid bruit  Cardiovascular:      Rate and Rhythm: Normal rate and regular rhythm  Heart sounds: Normal heart sounds  Pulmonary:      Effort: Pulmonary effort is normal  No respiratory distress  Breath sounds: Normal breath sounds  No wheezing or rales  Abdominal:      General: Bowel sounds are normal  There is no distension  Palpations: Abdomen is soft  Tenderness: There is no abdominal tenderness  Musculoskeletal:      Right lower leg: No edema  Left lower leg: No edema  Neurological:      General: No focal deficit present  Mental Status: She is alert and oriented to person, place, and time  Mental status is at baseline     Psychiatric:         Mood and Affect: Mood normal          Behavior: Behavior normal          Thought Content: Thought content normal          Judgment: Judgment normal          BMI Counseling: Body mass index is 35 56 kg/m²  The BMI is above normal  Nutrition recommendations include reducing portion sizes, decreasing overall calorie intake, reducing intake of saturated fat and trans fat and reducing intake of cholesterol  Exercise recommendations include moderate aerobic physical activity for 150 minutes/week

## 2021-05-14 NOTE — PATIENT INSTRUCTIONS
Medicare Preventive Visit Patient Instructions  Thank you for completing your Welcome to Medicare Visit or Medicare Annual Wellness Visit today  Your next wellness visit will be due in one year (5/15/2022)  The screening/preventive services that you may require over the next 5-10 years are detailed below  Some tests may not apply to you based off risk factors and/or age  Screening tests ordered at today's visit but not completed yet may show as past due  Also, please note that scanned in results may not display below  Preventive Screenings:  Service Recommendations Previous Testing/Comments   Colorectal Cancer Screening  * Colonoscopy    * Fecal Occult Blood Test (FOBT)/Fecal Immunochemical Test (FIT)  * Fecal DNA/Cologuard Test  * Flexible Sigmoidoscopy Age: 54-65 years old   Colonoscopy: every 10 years (may be performed more frequently if at higher risk)  OR  FOBT/FIT: every 1 year  OR  Cologuard: every 3 years  OR  Sigmoidoscopy: every 5 years  Screening may be recommended earlier than age 48 if at higher risk for colorectal cancer  Also, an individualized decision between you and your healthcare provider will decide whether screening between the ages of 74-80 would be appropriate  Colonoscopy: 07/11/2002  FOBT/FIT: 05/18/2019  Cologuard: Not on file  Sigmoidoscopy: Not on file          Breast Cancer Screening Age: 36 years old  Frequency: every 1-2 years  Not required if history of left and right mastectomy Mammogram: 10/06/2020    Screening Current   Cervical Cancer Screening Between the ages of 21-29, pap smear recommended once every 3 years  Between the ages of 33-67, can perform pap smear with HPV co-testing every 5 years     Recommendations may differ for women with a history of total hysterectomy, cervical cancer, or abnormal pap smears in past  Pap Smear: 02/17/2021    Screening Not Indicated   Hepatitis C Screening Once for adults born between 1945 and 1965  More frequently in patients at high risk for Hepatitis C Hep C Antibody: 04/25/2018    Screening Current   Diabetes Screening 1-2 times per year if you're at risk for diabetes or have pre-diabetes Fasting glucose: 104 mg/dL   A1C: 6 1 %        Cholesterol Screening Once every 5 years if you don't have a lipid disorder  May order more often based on risk factors  Lipid panel: 11/11/2019    Screening Not Indicated  History Lipid Disorder     Other Preventive Screenings Covered by Medicare:  1  Abdominal Aortic Aneurysm (AAA) Screening: covered once if your at risk  You're considered to be at risk if you have a family history of AAA  2  Lung Cancer Screening: covers low dose CT scan once per year if you meet all of the following conditions: (1) Age 50-69; (2) No signs or symptoms of lung cancer; (3) Current smoker or have quit smoking within the last 15 years; (4) You have a tobacco smoking history of at least 30 pack years (packs per day multiplied by number of years you smoked); (5) You get a written order from a healthcare provider  3  Glaucoma Screening: covered annually if you're considered high risk: (1) You have diabetes OR (2) Family history of glaucoma OR (3)  aged 48 and older OR (3)  American aged 72 and older  3  Osteoporosis Screening: covered every 2 years if you meet one of the following conditions: (1) You're estrogen deficient and at risk for osteoporosis based off medical history and other findings; (2) Have a vertebral abnormality; (3) On glucocorticoid therapy for more than 3 months; (4) Have primary hyperparathyroidism; (5) On osteoporosis medications and need to assess response to drug therapy  · Last bone density test (DXA Scan): Not on file  5  HIV Screening: covered annually if you're between the age of 12-76  Also covered annually if you are younger than 13 and older than 72 with risk factors for HIV infection   For pregnant patients, it is covered up to 3 times per pregnancy  Immunizations:  Immunization Recommendations   Influenza Vaccine Annual influenza vaccination during flu season is recommended for all persons aged >= 6 months who do not have contraindications   Pneumococcal Vaccine (Prevnar and Pneumovax)  * Prevnar = PCV13  * Pneumovax = PPSV23   Adults 25-60 years old: 1-3 doses may be recommended based on certain risk factors  Adults 72 years old: Prevnar (PCV13) vaccine recommended followed by Pneumovax (PPSV23) vaccine  If already received PPSV23 since turning 65, then PCV13 recommended at least one year after PPSV23 dose  Hepatitis B Vaccine 3 dose series if at intermediate or high risk (ex: diabetes, end stage renal disease, liver disease)   Tetanus (Td) Vaccine - COST NOT COVERED BY MEDICARE PART B Following completion of primary series, a booster dose should be given every 10 years to maintain immunity against tetanus  Td may also be given as tetanus wound prophylaxis  Tdap Vaccine - COST NOT COVERED BY MEDICARE PART B Recommended at least once for all adults  For pregnant patients, recommended with each pregnancy  Shingles Vaccine (Shingrix) - COST NOT COVERED BY MEDICARE PART B  2 shot series recommended in those aged 48 and above     Health Maintenance Due:      Topic Date Due    Colorectal Cancer Screening  05/18/2020    Hepatitis C Screening  Completed     Immunizations Due:      Topic Date Due    COVID-19 Vaccine (1) Never done    DTaP,Tdap,and Td Vaccines (1 - Tdap) Never done    Pneumococcal Vaccine: 65+ Years (1 of 1 - PPSV23) Never done     Advance Directives   What are advance directives? Advance directives are legal documents that state your wishes and plans for medical care  These plans are made ahead of time in case you lose your ability to make decisions for yourself  Advance directives can apply to any medical decision, such as the treatments you want, and if you want to donate organs  What are the types of advance directives? There are many types of advance directives, and each state has rules about how to use them  You may choose a combination of any of the following:  · Living will: This is a written record of the treatment you want  You can also choose which treatments you do not want, which to limit, and which to stop at a certain time  This includes surgery, medicine, IV fluid, and tube feedings  · Durable power of  for healthcare [facility]): This is a written record that states who you want to make healthcare choices for you when you are unable to make them for yourself  This person, called a proxy, is usually a family member or a friend  You may choose more than 1 proxy  · Do not resuscitate (DNR) order:  A DNR order is used in case your heart stops beating or you stop breathing  It is a request not to have certain forms of treatment, such as CPR  A DNR order may be included in other types of advance directives  · Medical directive: This covers the care that you want if you are in a coma, near death, or unable to make decisions for yourself  You can list the treatments you want for each condition  Treatment may include pain medicine, surgery, blood transfusions, dialysis, IV or tube feedings, and a ventilator (breathing machine)  · Values history: This document has questions about your views, beliefs, and how you feel and think about life  This information can help others choose the care that you would choose  Why are advance directives important? An advance directive helps you control your care  Although spoken wishes may be used, it is better to have your wishes written down  Spoken wishes can be misunderstood, or not followed  Treatments may be given even if you do not want them  An advance directive may make it easier for your family to make difficult choices about your care  Urinary Incontinence   Urinary incontinence (UI)  is when you lose control of your bladder   UI develops because your bladder cannot store or empty urine properly  The 3 most common types of UI are stress incontinence, urge incontinence, or both  Medicines:   · May be given to help strengthen your bladder control  Report any side effects of medication to your healthcare provider  Do pelvic muscle exercises often:  Your pelvic muscles help you stop urinating  Squeeze these muscles tight for 5 seconds, then relax for 5 seconds  Gradually work up to squeezing for 10 seconds  Do 3 sets of 15 repetitions a day, or as directed  This will help strengthen your pelvic muscles and improve bladder control  Train your bladder:  Go to the bathroom at set times, such as every 2 hours, even if you do not feel the urge to go  You can also try to hold your urine when you feel the urge to go  For example, hold your urine for 5 minutes when you feel the urge to go  As that becomes easier, hold your urine for 10 minutes  Self-care:   · Keep a UI record  Write down how often you leak urine and how much you leak  Make a note of what you were doing when you leaked urine  · Drink liquids as directed  You may need to limit the amount of liquid you drink to help control your urine leakage  Do not drink any liquid right before you go to bed  Limit or do not have drinks that contain caffeine or alcohol  · Prevent constipation  Eat a variety of high-fiber foods  Good examples are high-fiber cereals, beans, vegetables, and whole-grain breads  Walking is the best way to trigger your intestines to have a bowel movement  · Exercise regularly and maintain a healthy weight  Weight loss and exercise will decrease pressure on your bladder and help you control your leakage  · Use a catheter as directed  to help empty your bladder  A catheter is a tiny, plastic tube that is put into your bladder to drain your urine  · Go to behavior therapy as directed  Behavior therapy may be used to help you learn to control your urge to urinate      Weight Management   Why it is important to manage your weight:  Being overweight increases your risk of health conditions such as heart disease, high blood pressure, type 2 diabetes, and certain types of cancer  It can also increase your risk for osteoarthritis, sleep apnea, and other respiratory problems  Aim for a slow, steady weight loss  Even a small amount of weight loss can lower your risk of health problems  How to lose weight safely:  A safe and healthy way to lose weight is to eat fewer calories and get regular exercise  You can lose up about 1 pound a week by decreasing the number of calories you eat by 500 calories each day  Healthy meal plan for weight management:  A healthy meal plan includes a variety of foods, contains fewer calories, and helps you stay healthy  A healthy meal plan includes the following:  · Eat whole-grain foods more often  A healthy meal plan should contain fiber  Fiber is the part of grains, fruits, and vegetables that is not broken down by your body  Whole-grain foods are healthy and provide extra fiber in your diet  Some examples of whole-grain foods are whole-wheat breads and pastas, oatmeal, brown rice, and bulgur  · Eat a variety of vegetables every day  Include dark, leafy greens such as spinach, kale, arsen greens, and mustard greens  Eat yellow and orange vegetables such as carrots, sweet potatoes, and winter squash  · Eat a variety of fruits every day  Choose fresh or canned fruit (canned in its own juice or light syrup) instead of juice  Fruit juice has very little or no fiber  · Eat low-fat dairy foods  Drink fat-free (skim) milk or 1% milk  Eat fat-free yogurt and low-fat cottage cheese  Try low-fat cheeses such as mozzarella and other reduced-fat cheeses  · Choose meat and other protein foods that are low in fat  Choose beans or other legumes such as split peas or lentils  Choose fish, skinless poultry (chicken or turkey), or lean cuts of red meat (beef or pork)   Before you cook meat or poultry, cut off any visible fat  · Use less fat and oil  Try baking foods instead of frying them  Add less fat, such as margarine, sour cream, regular salad dressing and mayonnaise to foods  Eat fewer high-fat foods  Some examples of high-fat foods include french fries, doughnuts, ice cream, and cakes  · Eat fewer sweets  Limit foods and drinks that are high in sugar  This includes candy, cookies, regular soda, and sweetened drinks  Exercise:  Exercise at least 30 minutes per day on most days of the week  Some examples of exercise include walking, biking, dancing, and swimming  You can also fit in more physical activity by taking the stairs instead of the elevator or parking farther away from stores  Ask your healthcare provider about the best exercise plan for you  © Copyright [employer] 2018 Information is for End User's use only and may not be sold, redistributed or otherwise used for commercial purposes  All illustrations and images included in CareNotes® are the copyrighted property of [employer]  or [address] [place] [address]  Hyperlipidemia   WHAT YOU NEED TO KNOW:   What is hyperlipidemia? Hyperlipidemia is a high level of lipids (fats) in your blood  These lipids include cholesterol or triglycerides  Lipids are made by your body  They also come from the foods you eat  Your body needs lipids to work properly, but high levels increase your risk for heart disease, heart attack, and stroke  What increases my risk for hyperlipidemia? · Family history of high lipid levels    · Diet high in saturated fats, cholesterol, or calories     · High alcohol intake or smoking    · Lack of regular physical activity    · Medical conditions such as hypothyroidism, obesity, or type 2 diabetes    · Certain medicines, such as blood pressure medicines, hormones, and steroids    How is hyperlipidemia managed and treated?   Your healthcare provider may first recommend that you make lifestyle changes to help decrease your lipid levels  You may also need to take medicine to lower your lipid levels  Some of the lifestyle changes you may need to make include the following:  · Maintain a healthy weight  Ask your healthcare provider how much you should weigh  Ask him or her to help you create a weight loss plan if you are overweight  Weight loss can decrease your cholesterol and triglyceride levels  · Exercise as directed  Exercise lowers your cholesterol levels and helps you maintain a healthy weight  Get 30 minutes or more of aerobic exercise 4 to 6 days each week  You can split your exercise into four 10-minute workouts instead of 30 minutes at one time  Examples of aerobic exercises include walking briskly, swimming, or riding a bike  Work with your healthcare provider to plan the best exercise program for you  · Do not smoke  Nicotine and other chemicals in cigarettes and cigars can increase your risk for a heart attack and stroke  Ask your healthcare provider for information if you currently smoke and need help to quit  E-cigarettes or smokeless tobacco still contain nicotine  Talk to your healthcare provider before you use these products  · Eat heart-healthy foods  Talk to your dietitian about a heart-healthy diet  The following will help you manage hyperlipidemia:     ? Decrease the total amount of fat you eat  Choose lean meats, fat-free or 1% fat milk, and low-fat dairy products, such as yogurt and cheese  Limit or do not eat red meat  Red meats are high in fat and cholesterol  ? Replace unhealthy fats with healthy fats  Unhealthy fats include saturated fat, trans fat, and cholesterol  Choose soft margarines that are low in saturated fat and have little or no trans fat  Monounsaturated fats are healthy fats  These are found in olive oil, canola oil, avocado, and nuts  Polyunsaturated fats are also healthy  These are found in fish, flaxseed, walnuts, and soybeans       ? Eat 5 or more servings of fruits and vegetables every day  They are low in calories and fat, and a good source of essential vitamins  Include dark green, red, and orange vegetables  Examples include spinach, kale, broccoli, and carrots  ? Eat foods high in fiber  Fiber can help lower your cholesterol levels  Choose whole grain, high-fiber foods  Good choices include whole-wheat breads or cereals, beans, peas, fruits, and vegetables  · Ask your healthcare provider if it is safe for you to drink alcohol  Alcohol can increase your cholesterol and triglyceride levels  A drink of alcohol is 12 ounces of beer, 5 ounces of wine, or 1½ ounces of liquor  Call 911 for any of the following:   · You have any of the following signs of a heart attack:      ? Squeezing, pressure, or pain in your chest    ? You may  also have any of the following:     ? Discomfort or pain in your back, neck, jaw, stomach, or arm    ? Shortness of breath    ? Nausea or vomiting    ? Lightheadedness or a sudden cold sweat    · You have any of the following signs of a stroke:      ? Numbness or drooping on one side of your face     ? Weakness in an arm or leg    ? Confusion or difficulty speaking    ? Dizziness, a severe headache, or vision loss    When should I contact my healthcare provider? · You have questions or concerns about your condition or care  CARE AGREEMENT:   You have the right to help plan your care  Learn about your health condition and how it may be treated  Discuss treatment options with your healthcare providers to decide what care you want to receive  You always have the right to refuse treatment  The above information is an  only  It is not intended as medical advice for individual conditions or treatments  Talk to your doctor, nurse or pharmacist before following any medical regimen to see if it is safe and effective for you    © Copyright [address] Information is for End User's use only and may not be sold, redistributed or otherwise used for commercial purposes  All illustrations and images included in CareNotes® are the copyrighted property of [employer]  or [facility] Preventive Visit Patient Instructions  Thank you for completing your Welcome to Medicare Visit or Medicare Annual Wellness Visit today  Your next wellness visit will be due in one year (5/15/2022)  The screening/preventive services that you may require over the next 5-10 years are detailed below  Some tests may not apply to you based off risk factors and/or age  Screening tests ordered at today's visit but not completed yet may show as past due  Also, please note that scanned in results may not display below  Preventive Screenings:  Service Recommendations Previous Testing/Comments   Colorectal Cancer Screening  * Colonoscopy    * Fecal Occult Blood Test (FOBT)/Fecal Immunochemical Test (FIT)  * Fecal DNA/Cologuard Test  * Flexible Sigmoidoscopy Age: 54-65 years old   Colonoscopy: every 10 years (may be performed more frequently if at higher risk)  OR  FOBT/FIT: every 1 year  OR  Cologuard: every 3 years  OR  Sigmoidoscopy: every 5 years  Screening may be recommended earlier than age 48 if at higher risk for colorectal cancer  Also, an individualized decision between you and your healthcare provider will decide whether screening between the ages of 74-80 would be appropriate  Colonoscopy: 07/11/2002  FOBT/FIT: 05/18/2019  Cologuard: Not on file  Sigmoidoscopy: Not on file          Breast Cancer Screening Age: 36 years old  Frequency: every 1-2 years  Not required if history of left and right mastectomy Mammogram: 10/06/2020    Screening Current   Cervical Cancer Screening Between the ages of 21-29, pap smear recommended once every 3 years  Between the ages of 33-67, can perform pap smear with HPV co-testing every 5 years     Recommendations may differ for women with a history of total hysterectomy, cervical cancer, or abnormal pap smears in past  Pap Smear: 02/17/2021    Screening Not Indicated   Hepatitis C Screening Once for adults born between 1945 and 1965  More frequently in patients at high risk for Hepatitis C Hep C Antibody: 04/25/2018    Screening Current   Diabetes Screening 1-2 times per year if you're at risk for diabetes or have pre-diabetes Fasting glucose: 104 mg/dL   A1C: 6 1 %        Cholesterol Screening Once every 5 years if you don't have a lipid disorder  May order more often based on risk factors  Lipid panel: 11/11/2019    Screening Not Indicated  History Lipid Disorder     Other Preventive Screenings Covered by Medicare:  6  Abdominal Aortic Aneurysm (AAA) Screening: covered once if your at risk  You're considered to be at risk if you have a family history of AAA  7  Lung Cancer Screening: covers low dose CT scan once per year if you meet all of the following conditions: (1) Age 50-69; (2) No signs or symptoms of lung cancer; (3) Current smoker or have quit smoking within the last 15 years; (4) You have a tobacco smoking history of at least 30 pack years (packs per day multiplied by number of years you smoked); (5) You get a written order from a healthcare provider  8  Glaucoma Screening: covered annually if you're considered high risk: (1) You have diabetes OR (2) Family history of glaucoma OR (3)  aged 48 and older OR (3)  American aged 72 and older  5  Osteoporosis Screening: covered every 2 years if you meet one of the following conditions: (1) You're estrogen deficient and at risk for osteoporosis based off medical history and other findings; (2) Have a vertebral abnormality; (3) On glucocorticoid therapy for more than 3 months; (4) Have primary hyperparathyroidism; (5) On osteoporosis medications and need to assess response to drug therapy  · Last bone density test (DXA Scan): Not on file  10  HIV Screening: covered annually if you're between the age of 12-76   Also covered annually if you are younger than 13 and older than 72 with risk factors for HIV infection  For pregnant patients, it is covered up to 3 times per pregnancy  Immunizations:  Immunization Recommendations   Influenza Vaccine Annual influenza vaccination during flu season is recommended for all persons aged >= 6 months who do not have contraindications   Pneumococcal Vaccine (Prevnar and Pneumovax)  * Prevnar = PCV13  * Pneumovax = PPSV23   Adults 25-60 years old: 1-3 doses may be recommended based on certain risk factors  Adults 72 years old: Prevnar (PCV13) vaccine recommended followed by Pneumovax (PPSV23) vaccine  If already received PPSV23 since turning 65, then PCV13 recommended at least one year after PPSV23 dose  Hepatitis B Vaccine 3 dose series if at intermediate or high risk (ex: diabetes, end stage renal disease, liver disease)   Tetanus (Td) Vaccine - COST NOT COVERED BY MEDICARE PART B Following completion of primary series, a booster dose should be given every 10 years to maintain immunity against tetanus  Td may also be given as tetanus wound prophylaxis  Tdap Vaccine - COST NOT COVERED BY MEDICARE PART B Recommended at least once for all adults  For pregnant patients, recommended with each pregnancy  Shingles Vaccine (Shingrix) - COST NOT COVERED BY MEDICARE PART B  2 shot series recommended in those aged 48 and above     Health Maintenance Due:      Topic Date Due    Colorectal Cancer Screening  05/18/2020    Hepatitis C Screening  Completed     Immunizations Due:      Topic Date Due    COVID-19 Vaccine (1) Never done    DTaP,Tdap,and Td Vaccines (1 - Tdap) Never done    Pneumococcal Vaccine: 65+ Years (1 of 1 - PPSV23) Never done     Advance Directives   What are advance directives? Advance directives are legal documents that state your wishes and plans for medical care  These plans are made ahead of time in case you lose your ability to make decisions for yourself   Advance directives can apply to any medical decision, such as the treatments you want, and if you want to donate organs  What are the types of advance directives? There are many types of advance directives, and each state has rules about how to use them  You may choose a combination of any of the following:  · Living will: This is a written record of the treatment you want  You can also choose which treatments you do not want, which to limit, and which to stop at a certain time  This includes surgery, medicine, IV fluid, and tube feedings  · Durable power of  for healthcare [facility]): This is a written record that states who you want to make healthcare choices for you when you are unable to make them for yourself  This person, called a proxy, is usually a family member or a friend  You may choose more than 1 proxy  · Do not resuscitate (DNR) order:  A DNR order is used in case your heart stops beating or you stop breathing  It is a request not to have certain forms of treatment, such as CPR  A DNR order may be included in other types of advance directives  · Medical directive: This covers the care that you want if you are in a coma, near death, or unable to make decisions for yourself  You can list the treatments you want for each condition  Treatment may include pain medicine, surgery, blood transfusions, dialysis, IV or tube feedings, and a ventilator (breathing machine)  · Values history: This document has questions about your views, beliefs, and how you feel and think about life  This information can help others choose the care that you would choose  Why are advance directives important? An advance directive helps you control your care  Although spoken wishes may be used, it is better to have your wishes written down  Spoken wishes can be misunderstood, or not followed  Treatments may be given even if you do not want them   An advance directive may make it easier for your family to make difficult choices about your care  Urinary Incontinence   Urinary incontinence (UI)  is when you lose control of your bladder  UI develops because your bladder cannot store or empty urine properly  The 3 most common types of UI are stress incontinence, urge incontinence, or both  Medicines:   · May be given to help strengthen your bladder control  Report any side effects of medication to your healthcare provider  Do pelvic muscle exercises often:  Your pelvic muscles help you stop urinating  Squeeze these muscles tight for 5 seconds, then relax for 5 seconds  Gradually work up to squeezing for 10 seconds  Do 3 sets of 15 repetitions a day, or as directed  This will help strengthen your pelvic muscles and improve bladder control  Train your bladder:  Go to the bathroom at set times, such as every 2 hours, even if you do not feel the urge to go  You can also try to hold your urine when you feel the urge to go  For example, hold your urine for 5 minutes when you feel the urge to go  As that becomes easier, hold your urine for 10 minutes  Self-care:   · Keep a UI record  Write down how often you leak urine and how much you leak  Make a note of what you were doing when you leaked urine  · Drink liquids as directed  You may need to limit the amount of liquid you drink to help control your urine leakage  Do not drink any liquid right before you go to bed  Limit or do not have drinks that contain caffeine or alcohol  · Prevent constipation  Eat a variety of high-fiber foods  Good examples are high-fiber cereals, beans, vegetables, and whole-grain breads  Walking is the best way to trigger your intestines to have a bowel movement  · Exercise regularly and maintain a healthy weight  Weight loss and exercise will decrease pressure on your bladder and help you control your leakage  · Use a catheter as directed  to help empty your bladder  A catheter is a tiny, plastic tube that is put into your bladder to drain your urine     · Go to behavior therapy as directed  Behavior therapy may be used to help you learn to control your urge to urinate  Weight Management   Why it is important to manage your weight:  Being overweight increases your risk of health conditions such as heart disease, high blood pressure, type 2 diabetes, and certain types of cancer  It can also increase your risk for osteoarthritis, sleep apnea, and other respiratory problems  Aim for a slow, steady weight loss  Even a small amount of weight loss can lower your risk of health problems  How to lose weight safely:  A safe and healthy way to lose weight is to eat fewer calories and get regular exercise  You can lose up about 1 pound a week by decreasing the number of calories you eat by 500 calories each day  Healthy meal plan for weight management:  A healthy meal plan includes a variety of foods, contains fewer calories, and helps you stay healthy  A healthy meal plan includes the following:  · Eat whole-grain foods more often  A healthy meal plan should contain fiber  Fiber is the part of grains, fruits, and vegetables that is not broken down by your body  Whole-grain foods are healthy and provide extra fiber in your diet  Some examples of whole-grain foods are whole-wheat breads and pastas, oatmeal, brown rice, and bulgur  · Eat a variety of vegetables every day  Include dark, leafy greens such as spinach, kale, arsen greens, and mustard greens  Eat yellow and orange vegetables such as carrots, sweet potatoes, and winter squash  · Eat a variety of fruits every day  Choose fresh or canned fruit (canned in its own juice or light syrup) instead of juice  Fruit juice has very little or no fiber  · Eat low-fat dairy foods  Drink fat-free (skim) milk or 1% milk  Eat fat-free yogurt and low-fat cottage cheese  Try low-fat cheeses such as mozzarella and other reduced-fat cheeses  · Choose meat and other protein foods that are low in fat    Choose beans or other legumes such as split peas or lentils  Choose fish, skinless poultry (chicken or turkey), or lean cuts of red meat (beef or pork)  Before you cook meat or poultry, cut off any visible fat  · Use less fat and oil  Try baking foods instead of frying them  Add less fat, such as margarine, sour cream, regular salad dressing and mayonnaise to foods  Eat fewer high-fat foods  Some examples of high-fat foods include french fries, doughnuts, ice cream, and cakes  · Eat fewer sweets  Limit foods and drinks that are high in sugar  This includes candy, cookies, regular soda, and sweetened drinks  Exercise:  Exercise at least 30 minutes per day on most days of the week  Some examples of exercise include walking, biking, dancing, and swimming  You can also fit in more physical activity by taking the stairs instead of the elevator or parking farther away from stores  Ask your healthcare provider about the best exercise plan for you  © Copyright [employer] 2018 Information is for End User's use only and may not be sold, redistributed or otherwise used for commercial purposes  All illustrations and images included in CareNotes® are the copyrighted property of A D A M , Inc  or [address] [place] [address] [facility]    Obesity   AMBULATORY CARE:   Obesity  is when your body mass index (BMI) is greater than 30  Your healthcare provider will use your height and weight to measure your BMI  The risks of obesity include  many health problems, such as injuries or physical disability  You may need tests to check for the following:  · Diabetes    · High blood pressure or high cholesterol    · Heart disease    · Gallbladder or liver disease    · Cancer of the colon, breast, prostate, liver, or kidney    · Sleep apnea    · Arthritis or gout    Seek care immediately if:   · You have a severe headache, confusion, or difficulty speaking  · You have weakness on one side of your body  · You have chest pain, sweating, or shortness of breath      Contact your healthcare provider if:   · You have symptoms of gallbladder or liver disease, such as pain in your upper abdomen  · You have knee or hip pain and discomfort while walking  · You have symptoms of diabetes, such as intense hunger and thirst, and frequent urination  · You have symptoms of sleep apnea, such as snoring or daytime sleepiness  · You have questions or concerns about your condition or care  Treatment for obesity  focuses on helping you lose weight to improve your health  Even a small decrease in BMI can reduce the risk for many health problems  Your healthcare provider will help you set a weight-loss goal   · Lifestyle changes  are the first step in treating obesity  These include making healthy food choices and getting regular physical activity  Your healthcare provider may suggest a weight-loss program that involves coaching, education, and therapy  · Medicine  may help you lose weight when it is used with a healthy diet and physical activity  · Surgery  can help you lose weight if you are very obese and have other health problems  There are several types of weight-loss surgery  Ask your healthcare provider for more information  Be successful losing weight:   · Set small, realistic goals  An example of a small goal is to walk for 20 minutes 5 days a week  Des goal is to lose 5% of your body weight  · Tell friends, family members, and coworkers about your goals  and ask for their support  Ask a friend to lose weight with you, or join a weight-loss support group  · Identify foods or triggers that may cause you to overeat , and find ways to avoid them  Remove tempting high-calorie foods from your home and workplace  Place a bowl of fresh fruit on your kitchen counter  If stress causes you to eat, then find other ways to cope with stress  · Keep a diary to track what you eat and drink  Also write down how many minutes of physical activity you do each day   Weigh yourself once a week and record it in your diary  Eating changes: You will need to eat 500 to 1,000 fewer calories each day than you currently eat to lose 1 to 2 pounds a week  The following changes will help you cut calories:  · Eat smaller portions  Use small plates, no larger than 9 inches in diameter  Fill your plate half full of fruits and vegetables  Measure your food using measuring cups until you know what a serving size looks like  · Eat 3 meals and 1 or 2 snacks each day  Plan your meals in advance  [facility] and eat at home most of the time  Eat slowly  Do not skip meals  Skipping meals can lead to overeating later in the day  This can make it harder for you to lose weight  Talk with a dietitian to help you make a meal plan and schedule that is right for you  · Eat fruits and vegetables at every meal   They are low in calories and high in fiber, which makes you feel full  Do not add butter, margarine, or cream sauce to vegetables  Use herbs to season steamed vegetables  · Eat less fat and fewer fried foods  Eat more baked or grilled chicken and fish  These protein sources are lower in calories and fat than red meat  Limit fast food  Dress your salads with olive oil and vinegar instead of bottled dressing  · Limit the amount of sugar you eat  Do not drink sugary beverages  Limit alcohol  Activity changes:  Physical activity is good for your body in many ways  It helps you burn calories and build strong muscles  It decreases stress and depression, and improves your mood  It can also help you sleep better  Talk to your healthcare provider before you begin an exercise program   · Exercise for at least 30 minutes 5 days a week  Start slowly  Set aside time each day for physical activity that you enjoy and that is convenient for you  It is best to do both weight training and an activity that increases your heart rate, such as walking, bicycling, or swimming           · Find ways to be more active  Do yard work and housecleaning  Walk up the stairs instead of using elevators  Spend your leisure time going to events that require walking, such as outdoor festivals or fairs  This extra physical activity can help you lose weight and keep it off  Follow up with your healthcare provider as directed: You may need to meet with a dietitian  Write down your questions so you remember to ask them during your visits  © Copyright [address] Information is for End User's use only and may not be sold, redistributed or otherwise used for commercial purposes  All illustrations and images included in CareNotes® are the copyrighted property of A D A M , Inc  or [employer] [facility]  The above information is an  only  It is not intended as medical advice for individual conditions or treatments  Talk to your doctor, nurse or pharmacist before following any medical regimen to see if it is safe and effective for you  Low Fat Diet   AMBULATORY CARE:   A low-fat diet  is an eating plan that is low in total fat, unhealthy fat, and cholesterol  You may need to follow a low-fat diet if you have trouble digesting or absorbing fat  You may also need to follow this diet if you have high cholesterol  You can also lower your cholesterol by increasing the amount of fiber in your diet  Soluble fiber is a type of fiber that helps to decrease cholesterol levels  Different types of fat in food:   · Limit unhealthy fats  A diet that is high in cholesterol, saturated fat, and trans fat may cause unhealthy cholesterol levels  Unhealthy cholesterol levels increase your risk of heart disease  ? Cholesterol:  Limit intake of cholesterol to less than 200 mg per day  Cholesterol is found in meat, eggs, and dairy  ? Saturated fat:  Limit saturated fat to less than 7% of your total daily calories  Ask your dietitian how many calories you need each day   Saturated fat is found in butter, cheese, ice cream, whole milk, and palm oil  Saturated fat is also found in meat, such as beef, pork, chicken skin, and processed meats  Processed meats include sausage, hot dogs, and bologna  ? Trans fat:  Avoid trans fat as much as possible  Trans fat is used in fried and baked foods  Foods that say trans fat free on the label may still have up to 0 5 grams of trans fat per serving  · Include healthy fats  Replace foods that are high in saturated and trans fat with foods high in healthy fats  This may help to decrease high cholesterol levels  ? Monounsaturated fats: These are found in avocados, nuts, and vegetable oils, such as olive, canola, and sunflower oil  ? Polyunsaturated fats: These can be found in vegetable oils, such as soybean or corn oil  Omega-3 fats can help to decrease the risk of heart disease  Omega-3 fats are found in fish, such as salmon, herring, trout, and tuna  Omega-3 fats can also be found in plant foods, such as walnuts, flaxseed, soybeans, and canola oil  Foods to limit or avoid:   · Grains:      ? Snacks that are made with partially hydrogenated oils, such as chips, regular crackers, and butter-flavored popcorn    ? High-fat baked goods, such as biscuits, croissants, doughnuts, pies, cookies, and pastries    · Dairy:      ? Whole milk, 2% milk, and yogurt and ice cream made with whole milk    ? Half and half creamer, heavy cream, and whipping cream    ? Cheese, cream cheese, and sour cream    · Meats and proteins:      ? High-fat cuts of meat (T-bone steak, regular hamburger, and ribs)    ? Fried meat, poultry (turkey and chicken), and fish    ? Poultry (chicken and turkey) with skin    ? Cold cuts (salami or bologna), hot dogs, wolf, and sausage    ? Whole eggs and egg yolks    · Vegetables and fruits with added fat:      ? Fried vegetables or vegetables in butter or high-fat sauces, such as cream or cheese sauces    ?  Fried fruit or fruit served with butter or cream    · Fats: ? Butter, stick margarine, and shortening    ? Coconut, palm oil, and palm kernel oil    Foods to include:   · Grains:      ? Whole-grain breads, cereals, pasta, and brown rice    ? Low-fat crackers and pretzels    · Vegetables and fruits:      ? Fresh, frozen, or canned vegetables (no salt or low-sodium)    ? Fresh, frozen, dried, or canned fruit (canned in light syrup or fruit juice)    ? Avocado    · Low-fat dairy products:      ? Nonfat (skim) or 1% milk    ? Nonfat or low-fat cheese, yogurt, and cottage cheese    · Meats and proteins:      ? Chicken or turkey with no skin    ? Baked or broiled fish    ? Lean beef and pork (loin, round, extra lean hamburger)    ? Beans and peas, unsalted nuts, soy products    ? Egg whites and substitutes    ? Seeds and nuts    · Fats:      ? Unsaturated oil, such as canola, olive, peanut, soybean, or sunflower oil    ? Soft or liquid margarine and vegetable oil spread    ? Low-fat salad dressing    Other ways to decrease fat:   · Read food labels before you buy foods  Choose foods that have less than 30% of calories from fat  Choose low-fat or fat-free dairy products  Remember that fat free does not mean calorie free  These foods still contain calories, and too many calories can lead to weight gain  · Trim fat from meat and avoid fried food  Trim all visible fat from meat before you cook it  Remove the skin from poultry  Do not brian meat, fish, or poultry  Bake, roast, boil, or broil these foods instead  Avoid fried foods  Eat a baked potato instead of [place] fries  Steam vegetables instead of sautéing them in butter  · Add less fat to foods  Use imitation wolf bits on salads and baked potatoes instead of regular wolf bits  Use fat-free or low-fat salad dressings instead of regular dressings  Use low-fat or nonfat butter-flavored topping instead of regular butter or margarine on popcorn and other foods      Ways to decrease fat in recipes:  Replace high-fat ingredients with low-fat or nonfat ones  This may cause baked goods to be drier than usual  You may need to use nonfat cooking spray on pans to prevent food from sticking  You also may need to change the amount of other ingredients, such as water, in the recipe  Try the following:  · Use low-fat or light margarine instead of regular margarine or shortening  · Use lean ground turkey breast or chicken, or lean ground beef (less than 5% fat) instead of hamburger  · Add 1 teaspoon of canola oil to 8 ounces of skim milk instead of using cream or half and half  · Use grated zucchini, carrots, or apples in breads instead of coconut  · Use blenderized, low-fat cottage cheese, plain tofu, or low-fat ricotta cheese instead of cream cheese  · Use 1 egg white and 1 teaspoon of canola oil, or use ¼ cup (2 ounces) of fat-free egg substitute instead of a whole egg  · Replace half of the oil that is called for in a recipe with applesauce when you bake  Use 3 tablespoons of cocoa powder and 1 tablespoon of canola oil instead of a square of baking chocolate  How to increase fiber:  Eat enough high-fiber foods to get 20 to 30 grams of fiber every day  Slowly increase your fiber intake to avoid stomach cramps, gas, and other problems  · Eat 3 ounces of whole-grain foods each day  An ounce is about 1 slice of bread  Eat whole-grain breads, such as whole-wheat bread  Whole wheat, whole-wheat flour, or other whole grains should be listed as the first ingredient on the food label  Replace white flour with whole-grain flour or use half of each in recipes  Whole-grain flour is heavier than white flour, so you may have to add more yeast or baking powder  · Eat a high-fiber cereal for breakfast   Oatmeal is a good source of soluble fiber  Look for cereals that have bran or fiber in the name  Choose whole-grain products, such as brown rice, barley, and whole-wheat pasta  · Eat more beans, peas, and lentils  For example, add beans to soups or salads  Eat at least 5 cups of fruits and vegetables each day  Eat fruits and vegetables with the peel because the peel is high in fiber  © Copyright [address] Information is for End User's use only and may not be sold, redistributed or otherwise used for commercial purposes  All illustrations and images included in CareNotes® are the copyrighted property of [employer] M , Inc  or [facility]   The above information is an  only  It is not intended as medical advice for individual conditions or treatments  Talk to your doctor, nurse or pharmacist before following any medical regimen to see if it is safe and effective for you  Heart Healthy Diet   AMBULATORY CARE:   A heart healthy diet  is an eating plan low in unhealthy fats and sodium (salt)  The plan is high in healthy fats and fiber  A heart healthy diet helps improve your cholesterol levels and lowers your risk for heart disease and stroke  A dietitian will teach you how to read and understand food labels  Heart healthy diet guidelines to follow:   · Choose foods that contain healthy fats  ? Unsaturated fats  include monounsaturated and polyunsaturated fats  Unsaturated fat is found in foods such as soybean, canola, olive, corn, and safflower oils  It is also found in soft tub margarine that is made with liquid vegetable oil  ? Omega-3 fat  is found in certain fish, such as salmon, tuna, and trout, and in walnuts and flaxseed  Eat fish high in omega-3 fats at least 2 times a week  · Get 20 to 30 grams of fiber each day  Fruits, vegetables, whole-grain foods, and legumes (cooked beans) are good sources of fiber  · Limit or do not have unhealthy fats  ? Cholesterol  is found in animal foods, such as eggs and lobster, and in dairy products made from whole milk  Limit cholesterol to less than 200 mg each day      ? Saturated fat  is found in meats, such as wolf and hamburger  It is also found in chicken or turkey skin, whole milk, and butter  Limit saturated fat to less than 7% of your total daily calories  ? Trans fat  is found in packaged foods, such as potato chips and cookies  It is also in hard margarine, some fried foods, and shortening  Do not eat foods that contain trans fats  · Limit sodium as directed  You may be told to limit sodium to 2,000 to 2,300 mg each day  Choose low-sodium or no-salt-added foods  Add little or no salt to food you prepare  Use herbs and spices in place of salt  Include the following in your heart healthy plan:  Ask your dietitian or healthcare provider how many servings to have from each of the following food groups:  · Grains:      ? Whole-wheat breads, cereals, and pastas, and brown rice    ? Low-fat, low-sodium crackers and chips    · Vegetables:      ? Broccoli, green beans, green peas, and spinach    ? Collards, kale, and lima beans    ? Carrots, sweet potatoes, tomatoes, and peppers    ? Canned vegetables with no salt added    · Fruits:      ? Bananas, peaches, pears, and pineapple    ? Grapes, raisins, and dates    ? Oranges, tangerines, grapefruit, orange juice, and grapefruit juice    ? Apricots, mangoes, melons, and papaya    ? Raspberries and strawberries    ? Canned fruit with no added sugar    · Low-fat dairy:      ? Nonfat (skim) milk, 1% milk, and low-fat almond, cashew, or soy milks fortified with calcium    ? Low-fat cheese, regular or frozen yogurt, and cottage cheese    · Meats and proteins:      ? Lean cuts of beef and pork (loin, leg, round), skinless chicken and turkey    ? Legumes, soy products, egg whites, or nuts    Limit or do not include the following in your heart healthy plan:   · Unhealthy fats and oils:      ? Whole or 2% milk, cream cheese, sour cream, or cheese    ? High-fat cuts of beef (T-bone steaks, ribs), chicken or turkey with skin, and organ meats such as liver    ?  Butter, stick margarine, shortening, and cooking oils such as coconut or palm oil    · Foods and liquids high in sodium:      ? Packaged foods, such as frozen dinners, cookies, macaroni and cheese, and cereals with more than 300 mg of sodium per serving    ? Vegetables with added sodium, such as instant potatoes, vegetables with added sauces, or regular canned vegetables    ? Cured or smoked meats, such as hot dogs, wolf, and sausage    ? High-sodium ketchup, barbecue sauce, salad dressing, pickles, olives, soy sauce, or miso    · Foods and liquids high in sugar:      ? Candy, cake, cookies, pies, or doughnuts    ? Soft drinks (soda), sports drinks, or sweetened tea    ? Canned or dry mixes for cakes, soups, sauces, or gravies    Other healthy heart guidelines:   · Do not smoke  Nicotine and other chemicals in cigarettes and cigars can cause lung and heart damage  Ask your healthcare provider for information if you currently smoke and need help to quit  E-cigarettes or smokeless tobacco still contain nicotine  Talk to your healthcare provider before you use these products  · Limit or do not drink alcohol as directed  Alcohol can damage your heart and raise your blood pressure  Your healthcare provider may give you specific daily and weekly limits  The general recommended limit is 1 drink a day for women 21 or older and for men 72 or older  Do not have more than 3 drinks in a day or 7 in a week  The recommended limit is 2 drinks a day for men 24to 59years of age  Do not have more than 4 drinks in a day or 14 in a week  A drink of alcohol is 12 ounces of beer, 5 ounces of wine, or 1½ ounces of liquor  · Exercise regularly  Exercise can help you maintain a healthy weight and improve your blood pressure and cholesterol levels  Regular exercise can also decrease your risk for heart problems  Ask your healthcare provider about the best exercise plan for you  Do not start an exercise program without asking your healthcare provider  Follow up with your doctor or cardiologist as directed:  Write down your questions so you remember to ask them during your visits  © Copyright [address] Information is for End User's use only and may not be sold, redistributed or otherwise used for commercial purposes  All illustrations and images included in CareNotes® are the copyrighted property of [employer]  or Jerrica Fenton   The above information is an  only  It is not intended as medical advice for individual conditions or treatments  Talk to your doctor, nurse or pharmacist before following any medical regimen to see if it is safe and effective for you

## 2021-05-14 NOTE — PROGRESS NOTES
Assessment and Plan:     Problem List Items Addressed This Visit        Digestive    GERD without esophagitis    Relevant Medications    pantoprazole (PROTONIX) 20 mg tablet       Cardiovascular and Mediastinum    Venous insufficiency of both lower extremities    Relevant Orders    CBC and differential       Musculoskeletal and Integument    Primary osteoarthritis involving multiple joints    Relevant Orders    CBC and differential       Other    Vitamin D deficiency    Relevant Orders    Vitamin D 25 hydroxy    Hyperlipidemia - Primary    Relevant Orders    Comprehensive metabolic panel    Lipid Panel with Direct LDL reflex    TSH, 3rd generation with Free T4 reflex    Chronic right shoulder pain    Chronic lower back pain      Other Visit Diagnoses     Medicare annual wellness visit, subsequent        Encounter for other general examination        Screening for lipid disorders        Screening for diabetes mellitus (DM)        Screening for thyroid disorder        Screening for deficiency anemia        Relevant Orders    CBC and differential    Vulvovaginitis        Relevant Medications    metroNIDAZOLE (METROCREAM) 0 75 % cream    nystatin-triamcinolone (MYCOLOG-II) ointment           Preventive health issues were discussed with patient, and age appropriate screening tests were ordered as noted in patient's After Visit Summary  Personalized health advice and appropriate referrals for health education or preventive services given if needed, as noted in patient's After Visit Summary       History of Present Illness:     Patient presents for Medicare Annual Wellness visit    Patient Care Team:  Jelena Callahan DO as PCP - General (Internal Medicine)  Jelena Callahan DO as PCP - [address] [place], [place] [address])     Problem List:     Patient Active Problem List   Diagnosis    Chronic lower back pain    Chronic right shoulder pain    GERD without esophagitis    Hyperlipidemia    Nephrolithiasis    Sciatica of left side    Vitamin D deficiency    Primary osteoarthritis involving multiple joints    Venous insufficiency of both lower extremities      Past Medical and Surgical History:     Past Medical History:   Diagnosis Date    Abnormal electrocardiogram     last assessed 1/8/16    Chronic kidney disease     GERD (gastroesophageal reflux disease)     Kidney stone     Primary osteoarthritis involving multiple joints 11/7/2019     Past Surgical History:   Procedure Laterality Date    EYE SURGERY      TONSILLECTOMY      TOOTH EXTRACTION      TUBAL LIGATION        Family History:     Family History   Problem Relation Age of Onset    Other Mother         sheehans syndrome    Aneurysm Father     Nephrolithiasis Father     Hypertension Sister     Cirrhosis Brother         alcoholic liver    No Known Problems Daughter     No Known Problems Sister     No Known Problems Daughter     No Known Problems Daughter     Other Daughter     No Known Problems Maternal Aunt     No Known Problems Maternal Grandmother     No Known Problems Paternal Grandmother       Social History:     E-Cigarette/Vaping    E-Cigarette Use Never User      E-Cigarette/Vaping Substances    Nicotine No     THC No     CBD No     Flavoring No     Other No     Unknown No      Social History     Socioeconomic History    Marital status: /Civil Union     Spouse name: None    Number of children: None    Years of education: None    Highest education level: None   Occupational History    Occupation: Retired   Social Needs    Financial resource strain: None    Food insecurity     Worry: None     Inability: None    Transportation needs     Medical: None     Non-medical: None   Tobacco Use    Smoking status: Former Smoker    Smokeless tobacco: Never Used   Substance and Sexual Activity    Alcohol use: Yes     Comment: occasional    Drug use: No    Sexual activity: Not Currently   Lifestyle    Physical activity     Days per week: None     Minutes per session: None    Stress: None   Relationships    Social connections     Talks on phone: None     Gets together: None     Attends Dru[employer] service: None     Active member of club or organization: None     Attends meetings of clubs or organizations: None     Relationship status: None    Intimate partner violence     Fear of current or ex partner: None     Emotionally abused: None     Physically abused: None     Forced sexual activity: None   Other Topics Concern    None   Social History Narrative    RETIRED      Medications and Allergies:     Current Outpatient Medications   Medication Sig Dispense Refill    B Complex Vitamins (VITAMIN B COMPLEX 100 IJ) vitamin B complex      cholecalciferol (VITAMIN D) 400 units/mL Vitamin D      Coenzyme Q10 (COQ-10) 10 MG CAPS CoQ-10      ibuprofen (MOTRIN) 600 mg tablet Take 1 tablet (600 mg total) by mouth every 6 (six) hours as needed for mild pain 60 tablet 3    nystatin-triamcinolone (MYCOLOG-II) ointment Apply topically 2 (two) times a day 30 g 0    olopatadine (PATANOL) 0 1 % ophthalmic solution Administer 1 drop to both eyes 2 (two) times a day 5 mL 0    Omega-3 Fatty Acids (FISH OIL) 1,000 mg Fish Oil      pantoprazole (PROTONIX) 20 mg tablet Take 20 mg by mouth daily      Alfalfa 250 MG TABS alfalfa      metroNIDAZOLE (METROCREAM) 0 75 % cream Apply topically 2 (two) times a day 45 g 5    nystatin-triamcinolone (MYCOLOG-II) ointment Apply topically 2 (two) times a day 30 g 3    pyridoxine (VITAMIN B6) 100 mg tablet Vitamin B-6      triamcinolone (KENALOG) 0 1 % cream triamcinolone acetonide 0 1 % topical cream       No current facility-administered medications for this visit  Allergies   Allergen Reactions    Flu Virus Vaccine     Furosemide       Immunizations: There is no immunization history on file for this patient     Health Maintenance:         Topic Date Due    Colorectal Cancer Screening  05/18/2020    Hepatitis C Screening  Completed         Topic Date Due    COVID-19 Vaccine (1) Never done    DTaP,Tdap,and Td Vaccines (1 - Tdap) Never done    Pneumococcal Vaccine: 65+ Years (1 of 1 - PPSV23) Never done      Medicare Health Risk Assessment:     /72   Pulse 73   Temp 97 8 °F (36 6 °C)   Ht 4' 10" (1 473 m)   Wt 77 2 kg (170 lb 2 oz)   SpO2 97%   BMI 35 56 kg/m²      Sanjeev Bullock is here for her Subsequent Wellness visit  Last Medicare Wellness visit information reviewed, patient interviewed and updates made to the record today  Health Risk Assessment:   Patient rates overall health as good  Patient feels that their physical health rating is same  Patient is satisfied with their life  Eyesight was rated as same  Hearing was rated as same  Patient feels that their emotional and mental health rating is same  Patients states they are never, rarely angry  Patient states they are never, rarely unusually tired/fatigued  Pain experienced in the last 7 days has been none  Patient states that she has experienced no weight loss or gain in last 6 months  Depression Screening:   PHQ-2 Score: 0      Fall Risk Screening: In the past year, patient has experienced: no history of falling in past year      Urinary Incontinence Screening:   Patient has not leaked urine accidently in the last six months  Home Safety:  Patient has trouble with stairs inside or outside of their home  Patient has working smoke alarms and has working carbon monoxide detector  Home safety hazards include: none  Nutrition:   Current diet is Regular  Medications:   Patient is currently taking over-the-counter supplements  OTC medications include: see medication list  Patient is able to manage medications  Activities of Daily Living (ADLs)/Instrumental Activities of Daily Living (IADLs):   Walk and transfer into and out of bed and chair?: Yes  Dress and groom yourself?: Yes    Bathe or shower yourself?: Yes    Feed yourself? Yes  Do your laundry/housekeeping?: Yes  Manage your money, pay your bills and track your expenses?: Yes  Make your own meals?: Yes    Do your own shopping?: Yes    Previous Hospitalizations:   Any hospitalizations or ED visits within the last 12 months?: No      Advance Care Planning:   Living will: Yes    Durable POA for healthcare: Yes    Advanced directive: Yes    Advanced directive counseling given: Yes    Five wishes given: No    Patient declined ACP directive: No    End of Life Decisions reviewed with patient: Yes    Provider agrees with end of life decisions: Yes      Cognitive Screening:   Provider or family/friend/caregiver concerned regarding cognition?: No    PREVENTIVE SCREENINGS      Cardiovascular Screening:    General: History Lipid Disorder    Due for: Lipid Panel      Diabetes Screening:       Due for: Blood Glucose      Colorectal Cancer Screening:     General: Screening Current      Breast Cancer Screening:     General: Screening Current      Cervical Cancer Screening:    General: Screening Current      Abdominal Aortic Aneurysm (AAA) Screening:        General: Screening Not Indicated      Lung Cancer Screening:     General: Screening Not Indicated      Hepatitis C Screening:    General: Screening Current    Screening, Brief Intervention, and Referral to Treatment (SBIRT)    Screening  Typical number of drinks in a day: 0  Typical number of drinks in a week: 0  Interpretation: Low risk drinking behavior  Single Item Drug Screening:  How often have you used an illegal drug (including marijuana) or a prescription medication for non-medical reasons in the past year? never    Single Item Drug Screen Score: 0  Interpretation: Negative screen for possible drug use disorder    Other Counseling Topics:   Car/seat belt/driving safety, sunscreen and calcium and vitamin D intake and regular weightbearing exercise  A/P: Doing well and no falls reported  Denies depression and feels safe at home  Diverse diet  No problem operating a MV and  uses seat belts  Has a living will and POA  No DME or referrals needed today  RTC one year for medicare wellness       Tabatha Orozco,

## 2021-05-17 ENCOUNTER — APPOINTMENT (OUTPATIENT)
Dept: LAB | Facility: CLINIC | Age: 76
End: 2021-05-17
Payer: COMMERCIAL

## 2021-05-17 DIAGNOSIS — N76.0 VULVOVAGINITIS: ICD-10-CM

## 2021-05-17 DIAGNOSIS — M54.32 SCIATICA OF LEFT SIDE: Primary | ICD-10-CM

## 2021-05-17 DIAGNOSIS — Z13.0 SCREENING FOR DEFICIENCY ANEMIA: ICD-10-CM

## 2021-05-17 DIAGNOSIS — I87.2 VENOUS INSUFFICIENCY OF BOTH LOWER EXTREMITIES: ICD-10-CM

## 2021-05-17 DIAGNOSIS — E55.9 VITAMIN D DEFICIENCY: ICD-10-CM

## 2021-05-17 DIAGNOSIS — E78.2 MIXED HYPERLIPIDEMIA: ICD-10-CM

## 2021-05-17 DIAGNOSIS — M15.9 PRIMARY OSTEOARTHRITIS INVOLVING MULTIPLE JOINTS: ICD-10-CM

## 2021-05-17 LAB
25(OH)D3 SERPL-MCNC: 13.8 NG/ML (ref 30–100)
ALBUMIN SERPL BCP-MCNC: 3.5 G/DL (ref 3.5–5)
ALP SERPL-CCNC: 108 U/L (ref 46–116)
ALT SERPL W P-5'-P-CCNC: 28 U/L (ref 12–78)
ANION GAP SERPL CALCULATED.3IONS-SCNC: 2 MMOL/L (ref 4–13)
AST SERPL W P-5'-P-CCNC: 27 U/L (ref 5–45)
BASOPHILS # BLD AUTO: 0.07 THOUSANDS/ΜL (ref 0–0.1)
BASOPHILS NFR BLD AUTO: 1 % (ref 0–1)
BILIRUB SERPL-MCNC: 0.36 MG/DL (ref 0.2–1)
BUN SERPL-MCNC: 18 MG/DL (ref 5–25)
CALCIUM SERPL-MCNC: 9.3 MG/DL (ref 8.3–10.1)
CHLORIDE SERPL-SCNC: 108 MMOL/L (ref 100–108)
CHOLEST SERPL-MCNC: 195 MG/DL (ref 50–200)
CO2 SERPL-SCNC: 27 MMOL/L (ref 21–32)
CREAT SERPL-MCNC: 0.7 MG/DL (ref 0.6–1.3)
EOSINOPHIL # BLD AUTO: 0.79 THOUSAND/ΜL (ref 0–0.61)
EOSINOPHIL NFR BLD AUTO: 12 % (ref 0–6)
ERYTHROCYTE [DISTWIDTH] IN BLOOD BY AUTOMATED COUNT: 14.2 % (ref 11.6–15.1)
GFR SERPL CREATININE-BSD FRML MDRD: 85 ML/MIN/1.73SQ M
GLUCOSE P FAST SERPL-MCNC: 114 MG/DL (ref 65–99)
HCT VFR BLD AUTO: 39.2 % (ref 34.8–46.1)
HDLC SERPL-MCNC: 70 MG/DL
HGB BLD-MCNC: 12.3 G/DL (ref 11.5–15.4)
IMM GRANULOCYTES # BLD AUTO: 0.02 THOUSAND/UL (ref 0–0.2)
IMM GRANULOCYTES NFR BLD AUTO: 0 % (ref 0–2)
LDLC SERPL CALC-MCNC: 104 MG/DL (ref 0–100)
LYMPHOCYTES # BLD AUTO: 1.68 THOUSANDS/ΜL (ref 0.6–4.47)
LYMPHOCYTES NFR BLD AUTO: 26 % (ref 14–44)
MCH RBC QN AUTO: 28.3 PG (ref 26.8–34.3)
MCHC RBC AUTO-ENTMCNC: 31.4 G/DL (ref 31.4–37.4)
MCV RBC AUTO: 90 FL (ref 82–98)
MONOCYTES # BLD AUTO: 0.51 THOUSAND/ΜL (ref 0.17–1.22)
MONOCYTES NFR BLD AUTO: 8 % (ref 4–12)
NEUTROPHILS # BLD AUTO: 3.29 THOUSANDS/ΜL (ref 1.85–7.62)
NEUTS SEG NFR BLD AUTO: 53 % (ref 43–75)
NRBC BLD AUTO-RTO: 0 /100 WBCS
PLATELET # BLD AUTO: 233 THOUSANDS/UL (ref 149–390)
PMV BLD AUTO: 12.4 FL (ref 8.9–12.7)
POTASSIUM SERPL-SCNC: 4.7 MMOL/L (ref 3.5–5.3)
PROT SERPL-MCNC: 7.4 G/DL (ref 6.4–8.2)
RBC # BLD AUTO: 4.35 MILLION/UL (ref 3.81–5.12)
SODIUM SERPL-SCNC: 137 MMOL/L (ref 136–145)
TRIGL SERPL-MCNC: 104 MG/DL
TSH SERPL DL<=0.05 MIU/L-ACNC: 3.65 UIU/ML (ref 0.36–3.74)
WBC # BLD AUTO: 6.36 THOUSAND/UL (ref 4.31–10.16)

## 2021-05-17 PROCEDURE — 80061 LIPID PANEL: CPT

## 2021-05-17 PROCEDURE — 85025 COMPLETE CBC W/AUTO DIFF WBC: CPT

## 2021-05-17 PROCEDURE — 84443 ASSAY THYROID STIM HORMONE: CPT

## 2021-05-17 PROCEDURE — 80053 COMPREHEN METABOLIC PANEL: CPT

## 2021-05-17 PROCEDURE — 82306 VITAMIN D 25 HYDROXY: CPT

## 2021-05-17 PROCEDURE — 36415 COLL VENOUS BLD VENIPUNCTURE: CPT

## 2021-05-17 RX ORDER — METRONIDAZOLE 7.5 MG/G
1 GEL VAGINAL DAILY
Qty: 70 G | Refills: 0 | Status: SHIPPED | OUTPATIENT
Start: 2021-05-17 | End: 2022-07-13

## 2021-10-11 ENCOUNTER — TELEPHONE (OUTPATIENT)
Dept: INTERNAL MEDICINE CLINIC | Facility: CLINIC | Age: 76
End: 2021-10-11

## 2021-10-13 PROBLEM — R05.9 COUGH IN ADULT: Status: ACTIVE | Noted: 2021-10-13

## 2021-10-13 PROCEDURE — U0003 INFECTIOUS AGENT DETECTION BY NUCLEIC ACID (DNA OR RNA); SEVERE ACUTE RESPIRATORY SYNDROME CORONAVIRUS 2 (SARS-COV-2) (CORONAVIRUS DISEASE [COVID-19]), AMPLIFIED PROBE TECHNIQUE, MAKING USE OF HIGH THROUGHPUT TECHNOLOGIES AS DESCRIBED BY CMS-2020-01-R: HCPCS | Performed by: NURSE PRACTITIONER

## 2021-10-13 PROCEDURE — U0005 INFEC AGEN DETEC AMPLI PROBE: HCPCS | Performed by: NURSE PRACTITIONER

## 2021-10-20 ENCOUNTER — TELEPHONE (OUTPATIENT)
Dept: INTERNAL MEDICINE CLINIC | Facility: CLINIC | Age: 76
End: 2021-10-20

## 2021-11-03 ENCOUNTER — NURSE TRIAGE (OUTPATIENT)
Dept: OTHER | Facility: OTHER | Age: 76
End: 2021-11-03

## 2021-11-03 ENCOUNTER — TELEPHONE (OUTPATIENT)
Dept: INTERNAL MEDICINE CLINIC | Facility: CLINIC | Age: 76
End: 2021-11-03

## 2021-11-09 ENCOUNTER — OFFICE VISIT (OUTPATIENT)
Dept: INTERNAL MEDICINE CLINIC | Facility: CLINIC | Age: 76
End: 2021-11-09
Payer: COMMERCIAL

## 2021-11-09 VITALS
BODY MASS INDEX: 35.48 KG/M2 | TEMPERATURE: 97.5 F | SYSTOLIC BLOOD PRESSURE: 120 MMHG | OXYGEN SATURATION: 95 % | HEIGHT: 58 IN | DIASTOLIC BLOOD PRESSURE: 70 MMHG | HEART RATE: 85 BPM | WEIGHT: 169 LBS

## 2021-11-09 DIAGNOSIS — L50.9 URTICARIA: Primary | ICD-10-CM

## 2021-11-09 DIAGNOSIS — B35.4 TINEA CORPORIS: ICD-10-CM

## 2021-11-09 DIAGNOSIS — T50.905A ADVERSE EFFECT OF DRUG, INITIAL ENCOUNTER: ICD-10-CM

## 2021-11-09 PROCEDURE — 1036F TOBACCO NON-USER: CPT | Performed by: INTERNAL MEDICINE

## 2021-11-09 PROCEDURE — 1160F RVW MEDS BY RX/DR IN RCRD: CPT | Performed by: INTERNAL MEDICINE

## 2021-11-09 PROCEDURE — 99213 OFFICE O/P EST LOW 20 MIN: CPT | Performed by: INTERNAL MEDICINE

## 2021-11-09 RX ORDER — CLOTRIMAZOLE AND BETAMETHASONE DIPROPIONATE 10; .64 MG/G; MG/G
CREAM TOPICAL 2 TIMES DAILY
Qty: 45 G | Refills: 0 | Status: SHIPPED | OUTPATIENT
Start: 2021-11-09 | End: 2022-05-27

## 2021-11-09 RX ORDER — LORATADINE 10 MG/1
10 TABLET ORAL DAILY
Qty: 30 TABLET | Refills: 1 | Status: SHIPPED | OUTPATIENT
Start: 2021-11-09 | End: 2022-05-27

## 2021-11-09 RX ORDER — CIMETIDINE 400 MG/1
400 TABLET, FILM COATED ORAL 2 TIMES DAILY
Qty: 60 TABLET | Refills: 1 | Status: SHIPPED | OUTPATIENT
Start: 2021-11-09 | End: 2022-05-27

## 2021-11-09 RX ORDER — HYDROXYZINE HYDROCHLORIDE 25 MG/1
25 TABLET, FILM COATED ORAL EVERY 6 HOURS PRN
Qty: 30 TABLET | Refills: 1 | Status: SHIPPED | OUTPATIENT
Start: 2021-11-09 | End: 2022-05-27

## 2021-11-22 ENCOUNTER — TELEPHONE (OUTPATIENT)
Dept: INTERNAL MEDICINE CLINIC | Facility: CLINIC | Age: 76
End: 2021-11-22

## 2021-11-30 ENCOUNTER — OFFICE VISIT (OUTPATIENT)
Dept: INTERNAL MEDICINE CLINIC | Facility: CLINIC | Age: 76
End: 2021-11-30
Payer: COMMERCIAL

## 2021-11-30 VITALS
BODY MASS INDEX: 35.13 KG/M2 | WEIGHT: 167.38 LBS | OXYGEN SATURATION: 97 % | HEIGHT: 58 IN | SYSTOLIC BLOOD PRESSURE: 120 MMHG | HEART RATE: 75 BPM | DIASTOLIC BLOOD PRESSURE: 80 MMHG | TEMPERATURE: 97.9 F

## 2021-11-30 DIAGNOSIS — K21.9 GERD WITHOUT ESOPHAGITIS: ICD-10-CM

## 2021-11-30 DIAGNOSIS — E55.9 VITAMIN D DEFICIENCY: ICD-10-CM

## 2021-11-30 DIAGNOSIS — Z23 ENCOUNTER FOR VACCINATION: ICD-10-CM

## 2021-11-30 DIAGNOSIS — M62.08 DIASTASIS RECTI: ICD-10-CM

## 2021-11-30 DIAGNOSIS — M15.9 PRIMARY OSTEOARTHRITIS INVOLVING MULTIPLE JOINTS: ICD-10-CM

## 2021-11-30 DIAGNOSIS — E78.2 MIXED HYPERLIPIDEMIA: Primary | ICD-10-CM

## 2021-11-30 PROBLEM — R05.9 COUGH IN ADULT: Status: RESOLVED | Noted: 2021-10-13 | Resolved: 2021-11-30

## 2021-11-30 PROCEDURE — 99214 OFFICE O/P EST MOD 30 MIN: CPT | Performed by: INTERNAL MEDICINE

## 2021-12-07 ENCOUNTER — TELEPHONE (OUTPATIENT)
Dept: INTERNAL MEDICINE CLINIC | Facility: CLINIC | Age: 76
End: 2021-12-07

## 2021-12-07 ENCOUNTER — APPOINTMENT (OUTPATIENT)
Dept: LAB | Facility: CLINIC | Age: 76
End: 2021-12-07
Payer: COMMERCIAL

## 2021-12-07 DIAGNOSIS — E78.2 MIXED HYPERLIPIDEMIA: ICD-10-CM

## 2021-12-07 DIAGNOSIS — E55.9 VITAMIN D DEFICIENCY: ICD-10-CM

## 2021-12-07 LAB
25(OH)D3 SERPL-MCNC: 19 NG/ML (ref 30–100)
ALBUMIN SERPL BCP-MCNC: 3.5 G/DL (ref 3.5–5)
ALP SERPL-CCNC: 94 U/L (ref 46–116)
ALT SERPL W P-5'-P-CCNC: 26 U/L (ref 12–78)
ANION GAP SERPL CALCULATED.3IONS-SCNC: 6 MMOL/L (ref 4–13)
AST SERPL W P-5'-P-CCNC: 31 U/L (ref 5–45)
BILIRUB SERPL-MCNC: 0.5 MG/DL (ref 0.2–1)
BUN SERPL-MCNC: 15 MG/DL (ref 5–25)
CALCIUM SERPL-MCNC: 9.5 MG/DL (ref 8.3–10.1)
CHLORIDE SERPL-SCNC: 104 MMOL/L (ref 100–108)
CO2 SERPL-SCNC: 27 MMOL/L (ref 21–32)
CREAT SERPL-MCNC: 0.73 MG/DL (ref 0.6–1.3)
GFR SERPL CREATININE-BSD FRML MDRD: 80 ML/MIN/1.73SQ M
GLUCOSE P FAST SERPL-MCNC: 95 MG/DL (ref 65–99)
LDLC SERPL DIRECT ASSAY-MCNC: 131 MG/DL (ref 0–100)
POTASSIUM SERPL-SCNC: 4.1 MMOL/L (ref 3.5–5.3)
PROT SERPL-MCNC: 8.1 G/DL (ref 6.4–8.2)
SODIUM SERPL-SCNC: 137 MMOL/L (ref 136–145)
TRIGL SERPL-MCNC: 133 MG/DL

## 2021-12-07 PROCEDURE — 80053 COMPREHEN METABOLIC PANEL: CPT

## 2021-12-07 PROCEDURE — 36415 COLL VENOUS BLD VENIPUNCTURE: CPT

## 2021-12-07 PROCEDURE — 84478 ASSAY OF TRIGLYCERIDES: CPT

## 2021-12-07 PROCEDURE — 83721 ASSAY OF BLOOD LIPOPROTEIN: CPT

## 2021-12-07 PROCEDURE — 82306 VITAMIN D 25 HYDROXY: CPT

## 2022-01-18 ENCOUNTER — TELEPHONE (OUTPATIENT)
Dept: INTERNAL MEDICINE CLINIC | Facility: CLINIC | Age: 77
End: 2022-01-18

## 2022-01-18 DIAGNOSIS — J06.9 UPPER RESPIRATORY TRACT INFECTION, UNSPECIFIED TYPE: Primary | ICD-10-CM

## 2022-01-18 PROCEDURE — 87636 SARSCOV2 & INF A&B AMP PRB: CPT | Performed by: INTERNAL MEDICINE

## 2022-01-18 NOTE — TELEPHONE ENCOUNTER
Pt's daughter tested positive today, headache, runny nose, eyes feel sticky in the morning since 1/17/22, not vaccinated   Will you order covid test?

## 2022-01-19 LAB
FLUAV RNA RESP QL NAA+PROBE: NEGATIVE
FLUBV RNA RESP QL NAA+PROBE: NEGATIVE
SARS-COV-2 RNA RESP QL NAA+PROBE: POSITIVE

## 2022-01-20 ENCOUNTER — TELEMEDICINE (OUTPATIENT)
Dept: INTERNAL MEDICINE CLINIC | Facility: CLINIC | Age: 77
End: 2022-01-20
Payer: COMMERCIAL

## 2022-01-20 DIAGNOSIS — Z71.89 EDUCATED ABOUT COVID-19 VIRUS INFECTION: ICD-10-CM

## 2022-01-20 DIAGNOSIS — U07.1 LAB TEST POSITIVE FOR DETECTION OF COVID-19 VIRUS: Primary | ICD-10-CM

## 2022-01-20 PROCEDURE — G2012 BRIEF CHECK IN BY MD/QHP: HCPCS | Performed by: INTERNAL MEDICINE

## 2022-01-20 NOTE — PROGRESS NOTES
COVID-19 Outpatient Progress Note    Assessment/Plan:    Problem List Items Addressed This Visit     None      Visit Diagnoses     Lab test positive for detection of COVID-19 virus    -  Primary    Educated about COVID-19 virus infection             Disposition:     Patient has COVID-19 infection  Based off CDC guidelines, they were recommended to isolate for 5 days from the date of the positive test  If they remain asymptomatic, isolation may be ended followed by 5 days of wearing a mask when around othes to minimize risk of infecting others  If they have a fever, continue to stay home until fever resolves for at least 24 hours  I recommended continued isolation until at least 24 hours have passed since recovery defined as resolution of fever without the use of fever-reducing medications AND improvement in COVID symptoms AND 10 days have passed since onset of symptoms (or 10 days have passed since date of first positive viral diagnostic test for asymptomatic patients)  A/P: Day # 3 since onset of COVID s/s  Covid test was positive    Doing ok and mainly nasal congestion, myalgia, and cough, but no SOB    Discussed the monoclonal ab infusion and doesn't met the current criteria    Continue isolation/quarantine, increase fluids, PRN motrin/tylenol, and breathing exercises  RTC one day  for f/u  I have spent 20 minutes directly with the patient  Greater than 50% of this time was spent in counseling/coordination of care regarding: diagnostic results, prognosis, risks and benefits of treatment options, instructions for management, patient and family education, importance of treatment compliance, risk factor reductions and impressions        Encounter provider Frank Gutierrez DO    Provider located at [address] [place] [id number]    Recent Visits  Date Type Provider Dept   01/18/22 Telephone Trent Trujillo   Showing recent visits within past 7 days and meeting all other requirements  Today's Visits  Date Type Provider Dept   01/20/22 Telemedicine DO [facility] [facility] today's visits and meeting all other requirements  Future Appointments  No visits were found meeting these conditions  Showing future appointments within next 150 days and meeting all other requirements     This virtual check-in was done via telephone and she agrees to proceed  Patient agrees to participate in a virtual check in via telephone or video visit instead of presenting to the office to address urgent/immediate medical needs  Patient is aware this is a billable service  After connecting through Telephone, the patient was identified by name and date of birth  Jana Rausch was informed that this was a telemedicine visit and that the exam was being conducted confidentially over secure lines  My office door was closed  No one else was in the room  Jana Rausch acknowledged consent and understanding of privacy and security of the telemedicine visit  I informed the patient that I have reviewed her record in Epic and presented the opportunity for her to ask any questions regarding the visit today  The patient agreed to participate  It was my intent to perform this visit via video technology but the patient was not able to do a video connection so the visit was completed via audio telephone only  Verification of patient location:  Patient is located in the following state in which I hold an active license: [place]    Subjective:   Jana Rausch is a 68 y o  female who has been screened for COVID-19  Symptom change since last report: improving  Patient's symptoms include nasal congestion, rhinorrhea, cough and myalgias  Patient denies fever, chills, fatigue, sore throat, anosmia, loss of taste, shortness of breath, chest tightness, abdominal pain, nausea, vomiting, diarrhea and headaches       - Date of symptom onset: 1/18/2022  - Date of positive COVID-19 test: 1/18/2022  Type of test: PCR  COVID-19 vaccination status: Not vaccinated    Clare Smyth has been staying home and has isolated themselves in her home  She is taking care to not share personal items and is cleaning all surfaces that are touched often, like counters, tabletops, and doorknobs using household cleaning sprays or wipes  She is wearing a mask when she leaves her room       Lab Results   Component Value Date    SARSCOV2 Positive (A) 01/18/2022    SARSCOV2 Not Detected 12/14/2020     Past Medical History:   Diagnosis Date    Abnormal electrocardiogram     last assessed 1/8/16    Chronic kidney disease     GERD (gastroesophageal reflux disease)     Kidney stone     Primary osteoarthritis involving multiple joints 11/7/2019     Past Surgical History:   Procedure Laterality Date    EYE SURGERY      TONSILLECTOMY      TOOTH EXTRACTION      TUBAL LIGATION       Current Outpatient Medications   Medication Sig Dispense Refill    cholecalciferol (VITAMIN D) 400 units/mL Vitamin D      Alfalfa 250 MG TABS alfalfa (Patient not taking: Reported on 1/20/2022)      B Complex Vitamins (VITAMIN B COMPLEX 100 IJ) vitamin B complex (Patient not taking: Reported on 1/20/2022)      cimetidine (TAGAMET) 400 mg tablet Take 1 tablet (400 mg total) by mouth 2 (two) times a day (Patient not taking: Reported on 1/20/2022 ) 60 tablet 1    clotrimazole-betamethasone (LOTRISONE) 1-0 05 % cream Apply topically 2 (two) times a day (Patient not taking: Reported on 1/20/2022 ) 45 g 0    Coenzyme Q10 (COQ-10) 10 MG CAPS CoQ-10 (Patient not taking: Reported on 1/20/2022)      hydrOXYzine HCL (ATARAX) 25 mg tablet Take 1 tablet (25 mg total) by mouth every 6 (six) hours as needed for itching (Patient not taking: Reported on 1/20/2022 ) 30 tablet 1    ibuprofen (MOTRIN) 600 mg tablet Take 1 tablet (600 mg total) by mouth every 6 (six) hours as needed for mild pain (Patient not taking: Reported on 1/20/2022 ) 60 tablet 3    loratadine (CLARITIN) 10 mg tablet Take 1 tablet (10 mg total) by mouth daily (Patient not taking: Reported on 1/20/2022 ) 30 tablet 1    metroNIDAZOLE (METROCREAM) 0 75 % cream Apply topically 2 (two) times a day (Patient not taking: Reported on 1/20/2022 ) 45 g 5    metroNIDAZOLE (METROGEL) 0 75 % vaginal gel Insert 1 application into the vagina daily (Patient not taking: Reported on 1/20/2022 ) 70 g 0    nystatin-triamcinolone (MYCOLOG-II) ointment Apply topically 2 (two) times a day (Patient not taking: Reported on 1/20/2022 ) 30 g 0    nystatin-triamcinolone (MYCOLOG-II) ointment Apply topically 2 (two) times a day (Patient not taking: Reported on 1/20/2022 ) 30 g 3    olopatadine (PATANOL) 0 1 % ophthalmic solution Administer 1 drop to both eyes 2 (two) times a day (Patient not taking: Reported on 1/20/2022 ) 5 mL 0    Omega-3 Fatty Acids (FISH OIL) 1,000 mg Fish Oil (Patient not taking: Reported on 1/20/2022)      pantoprazole (PROTONIX) 20 mg tablet Take 20 mg by mouth daily (Patient not taking: Reported on 11/30/2021 )      pyridoxine (VITAMIN B6) 100 mg tablet Vitamin B-6 (Patient not taking: Reported on 1/20/2022)      triamcinolone (KENALOG) 0 1 % cream triamcinolone acetonide 0 1 % topical cream (Patient not taking: Reported on 1/20/2022)       No current facility-administered medications for this visit  Allergies   Allergen Reactions    Influenza Virus Vaccine Swelling    Furosemide     Doxycycline Rash       Review of Systems   Constitutional: Positive for activity change  Negative for chills, diaphoresis, fatigue and fever  HENT: Positive for congestion, postnasal drip and rhinorrhea  Negative for ear discharge, ear pain, facial swelling, sinus pressure, sinus pain and sore throat  No loss of taste/smell  Respiratory: Positive for cough  Negative for chest tightness, shortness of breath and wheezing      Cardiovascular: Negative for chest pain, palpitations and leg swelling  Gastrointestinal: Negative for abdominal pain, constipation, diarrhea, nausea and vomiting  Genitourinary: Negative for difficulty urinating, dysuria and frequency  Musculoskeletal: Positive for myalgias  Negative for arthralgias and gait problem  Neurological: Negative for light-headedness and headaches  Psychiatric/Behavioral: Negative for confusion  The patient is not nervous/anxious  Objective:    Vitals:       Physical Exam  Vitals and nursing note reviewed  Constitutional:       General: She is not in acute distress  Appearance: Normal appearance  She is not ill-appearing  HENT:      Head: Normocephalic and atraumatic  Mouth/Throat:      Mouth: Mucous membranes are moist    Eyes:      Extraocular Movements: Extraocular movements intact  Conjunctiva/sclera: Conjunctivae normal       Pupils: Pupils are equal, round, and reactive to light  Pulmonary:      Effort: Pulmonary effort is normal  No respiratory distress  Neurological:      General: No focal deficit present  Mental Status: She is alert and oriented to person, place, and time  Mental status is at baseline  Psychiatric:         Mood and Affect: Mood normal          Behavior: Behavior normal          Thought Content: Thought content normal          Judgment: Judgment normal          VIRTUAL VISIT DISCLAIMER    Mikey Marmolejo verbally agrees to participate in [employer]  Pt is aware that [employer] could be limited without vital signs or the ability to perform a full hands-on physical Nino Fanny understands she or the provider may request at any time to terminate the video visit and request the patient to seek care or treatment in person

## 2022-01-20 NOTE — PATIENT INSTRUCTIONS
How to Recover from COVID-19 at [facility]:   COVID-19 can cause a range of symptoms, from mild to severe  If you do not need to be treated in a hospital, you will be given instructions to use at home  You will need to watch for worsening symptoms and seek immediate care if needed  You will also need to stay physically apart from others so you do not spread the virus to anyone  Information about COVID-19 is still being learned  It is not known if a person can be infected with the virus again after recovering from COVID-19  It is also not known if or for how long the virus can continue to be passed to others  DISCHARGE INSTRUCTIONS:   If you think someone in your home may be infected,  do the following to protect others:  · If emergency care is needed,  tell the  about the possible infection, or call ahead and tell the emergency department  · Call a healthcare provider  for instructions if symptoms are mild  Anyone who may be infected should not  arrive without calling first  The provider will need to protect staff members and other patients  · The person who may be infected needs to wear a face covering  while getting medical care  This will help lower the risk of infecting others  Coverings are not used for anyone who is younger than 2 years, has breathing problems, or cannot remove it  The provider can give you instructions for anyone who cannot wear a covering  Call your local emergency number (911 in the [address] [place] [address],[address]) or an emergency department if:   · You have trouble breathing or shortness of breath at rest     · You have chest pain or pressure that lasts longer than 5 minutes  · You become confused or hard to wake  · Your lips or face are blue  · You have a fever of 104°F (40°C) or higher  Call your doctor if:   · You have new, returning, or worsening symptoms      · Someone in your home does not  have symptoms of COVID-19 but had close physical contact within 14 days with you     · You have questions or concerns about your condition or care  Medicines:   · NSAIDs , such as ibuprofen, help decrease swelling, pain, and fever  NSAIDs can cause stomach bleeding or kidney problems in certain people  If you take blood thinner medicine, always ask your healthcare provider if NSAIDs are safe for you  Always read the medicine label and follow directions  · Acetaminophen  decreases pain and fever  It is available without a doctor's order  Ask how much to take and how often to take it  Follow directions  Read the labels of all other medicines you are using to see if they also contain acetaminophen, or ask your doctor or pharmacist  Acetaminophen can cause liver damage if not taken correctly  Do not use more than 4 grams (4,000 milligrams) total of acetaminophen in one day  · Take your medicine as directed  Contact your healthcare provider if you think your medicine is not helping or if you have side effects  Tell him or her if you are allergic to any medicine  Keep a list of the medicines, vitamins, and herbs you take  Include the amounts, and when and why you take them  Bring the list or the pill bottles to follow-up visits  Carry your medicine list with you in case of an emergency  Self-care:  Mild symptoms may get better on their own  The following may be used to manage your symptoms:  · Decongestants  help reduce nasal congestion and help you breathe more easily  If you take decongestant pills, they may make you feel restless or cause problems with your sleep  Do not use decongestant sprays for more than a few days  · Cough suppressants  help reduce coughing  Ask your healthcare provider which type of cough medicine is best for you  · To soothe a sore throat,  gargle with warm salt water, or use throat lozenges or a throat spray  Your healthcare provider may recommend a cough medicine  Drink more liquids to thin and loosen mucus and to prevent dehydration   Use decongestants or saline drops as directed for nasal congestion  Keep others safe while you are recovering at home:  Healthcare providers will give you specific instructions to follow  The following are general guidelines to remind you how to keep others safe until you are well:     · Wash your hands often  Use soap and water as much as possible  You can use hand  that contains alcohol if soap and water are not available  Do not share towels with anyone  If you use paper towels, throw them away in a lined trash can kept in your room or area  Use a covered trash can, if possible  · Cover sneezes and coughs  Turn your face away and cover your mouth and nose with a tissue  Throw the tissue away  Use the bend of your arm if a tissue is not available  Then wash your hands well with soap and water or use hand   · Wear a face covering (mask) around anyone who does not live in your home  A covering will help prevent you from passing the virus to others  It is not known for sure if or for how long the virus can be passed after recovery  Do not  wear a plastic face shield instead of a covering  Use a cloth covering with at least 2 layers  You can also create layers by putting a cloth covering over a disposable non-medical mask  Cover your mouth and your nose  The covering should fit snugly against the bridge of your nose  Securely fasten it under your chin and on the sides of your face  A face covering is not a substitute for other safety measures  Continue social distancing and washing your hands often  · Do not go out of your home unless it is necessary  If possible, ask someone who is not infected to go out for groceries, medicines, and household items  Ask your healthcare provider for other ways to have appointments  Some providers offer phone, video, or other types of appointments   If you need to be seen in person, call ahead to make sure the office will be ready for you     · Do not let anyone into your home, room, or area unless it is necessary  If possible, stay in a separate area or room of your home if you live with others  No one should go into the area or room except to give you care  Only allow medical professionals or other necessary helpers in  Wear a face covering  Remind them to wear face coverings and to wash their hands  If possible, ask someone who is well to care for your baby  You can put breast milk in bottles for the person to use, if needed  Wear a clean face covering if you need to breastfeed or express or pump breast milk  Family members and friends should not visit you  You can visit with others by phone, video chat, e-mail, or similar systems  It is important to stay connected with others in your life while you recover  · Talk to your healthcare provider about your baby  Tell him or her if you have any questions or concerns about caring for or bonding with your baby  He or she will tell you when to bring your baby in for check-ups and vaccines  He or she will also tell you what to do if you think your baby was infected with the coronavirus  · Do not handle live animals unless it is necessary  Until more is known, it is best not to touch, play with, or handle live animals  Some animals, including pets, have been infected with the new coronavirus  Do not handle or care for animals until you are well  Care includes feeding, petting, and cuddling your pet  Do not let your pet lick you or share your food  Ask someone who is not infected to take care of your pet, if possible  If you must care for a pet, wear a face covering  Wash your hands before and after you give care  Talk to your healthcare provider about how to keep a service animal safe, if needed  · Follow directions from your healthcare provider for being around others after you recover  It is not known for sure if or for how long a recovered person can pass the virus to others   Your provider may give you instructions, such as continuing social distancing or wearing a face covering around others  The following are general guidelines for when you can be around others:    ? If you never developed any symptoms,  wait at least 10 days after your positive test  Your provider may want you to have 2 negative tests in a row at least 24 hours apart  This depends on how available testing is in your area  ? If you did have symptoms,  wait at least 10 days after the symptoms first appeared  Then you will need to have no fever for 24 hours without fever medicine  Most of your symptoms will also need to be gone  A loss of taste or smell may continue for several months  It is considered okay to be around others if this is your only symptom  ? If you were hospitalized for COVID-19 and needed oxygen,  your provider will tell you how long to wait  You may need to wait until 20 days after symptoms appeared  It may be less if you have 2 negative tests in a row at least 24 hours apart  This will depend on how available testing is in your area  Follow up with your doctor as directed:  Write down your questions so you remember to ask them during your visits  For more information:   · Centers for Disease Control and Prevention  [address] , [address]  Phone: 4- 194 - 102-2003  Web Address: [employer] br    © [facility] 2021 Information is for End User's use only and may not be sold, redistributed or otherwise used for commercial purposes  All illustrations and images included in CareNotes® are the copyrighted property of A D A M , Inc  or [address] [place] [address] [place]   The above information is an  only  It is not intended as medical advice for individual conditions or treatments  Talk to your doctor, nurse or pharmacist before following any medical regimen to see if it is safe and effective for you

## 2022-01-21 ENCOUNTER — TELEMEDICINE (OUTPATIENT)
Dept: INTERNAL MEDICINE CLINIC | Facility: CLINIC | Age: 77
End: 2022-01-21
Payer: COMMERCIAL

## 2022-01-21 DIAGNOSIS — U07.1 LAB TEST POSITIVE FOR DETECTION OF COVID-19 VIRUS: Primary | ICD-10-CM

## 2022-01-21 DIAGNOSIS — Z71.89 EDUCATED ABOUT COVID-19 VIRUS INFECTION: ICD-10-CM

## 2022-01-21 PROCEDURE — G2012 BRIEF CHECK IN BY MD/QHP: HCPCS | Performed by: INTERNAL MEDICINE

## 2022-01-21 NOTE — PROGRESS NOTES
COVID-19 Outpatient Progress Note    Assessment/Plan:    Problem List Items Addressed This Visit     None      Visit Diagnoses     Lab test positive for detection of COVID-19 virus    -  Primary    Educated about COVID-19 virus infection             Disposition:     Patient has COVID-19 infection  Based off CDC guidelines, they were recommended to isolate for 5 days from the date of the positive test  If they remain asymptomatic, isolation may be ended followed by 5 days of wearing a mask when around othes to minimize risk of infecting others  If they have a fever, continue to stay home until fever resolves for at least 24 hours  I recommended continued isolation until at least 24 hours have passed since recovery defined as resolution of fever without the use of fever-reducing medications AND improvement in COVID symptoms AND 10 days have passed since onset of symptoms (or 10 days have passed since date of first positive viral diagnostic test for asymptomatic patients)  A/P: Day # 4 since onset of COVID s/s  Covid test was positive  Doing a little better but had some JARVIS only  No conversational dyspnea  Mainly some nasal congestion along with a cough  RE-DISCUSSED the monoclonal infusion and techinially meets the criteria, but clinically on the border w/o any conversational dyspnea  Discussed the process and pt doesn't want to have to travel to the infusion site in [place]  Wanted and abx and steroids, but not appropriate at this time  Continue isolation/quarantine, increase fluids, PRN motrin/tylenol, and breathing exercises  May lift restrictions as of 1/22 if remains afebrile off meds and s/s improved  Will need to continue to mask and distance herself for at least five more days  RTC three days  for f/u  I have spent 15 minutes directly with the patient   Greater than 50% of this time was spent in counseling/coordination of care regarding: prognosis, risks and benefits of treatment options, instructions for management, patient and family education, importance of treatment compliance, risk factor reductions and impressions  Encounter provider Kobe Hendrix DO    Provider located at [address] [place] [id number]    Recent Visits  Date Type Provider Dept   01/20/22 Telemedicine DO [facility]   01/18/22 Telephone [facility]   Showing recent visits within past 7 days and meeting all other requirements  Today's Visits  Date Type Provider Dept   01/21/22 Telemedicine Koeb Hendrix DO [facility] today's visits and meeting all other requirements  Future Appointments  No visits were found meeting these conditions  Showing future appointments within next 150 days and meeting all other requirements     This virtual check-in was done via telephone and she agrees to proceed  Patient agrees to participate in a virtual check in via telephone or video visit instead of presenting to the office to address urgent/immediate medical needs  Patient is aware this is a billable service  After connecting through Telephone, the patient was identified by name and date of birth  Dionicio Gonzalez was informed that this was a telemedicine visit and that the exam was being conducted confidentially over secure lines  My office door was closed  No one else was in the room  Dionicio Gonzalez acknowledged consent and understanding of privacy and security of the telemedicine visit  I informed the patient that I have reviewed her record in Epic and presented the opportunity for her to ask any questions regarding the visit today  The patient agreed to participate  It was my intent to perform this visit via video technology but the patient was not able to do a video connection so the visit was completed via audio telephone only          Verification of patient location:  Patient is located in the following state in which I hold an active license: [place]    Subjective:   Angelica Reyes is a 68 y o  female who has been screened for COVID-19  Symptom change since last report: improving  Patient's symptoms include chills, nasal congestion, rhinorrhea, sore throat, cough, shortness of breath, myalgias and headache  Patient denies fever, fatigue, anosmia, loss of taste, chest tightness, abdominal pain, nausea, vomiting and diarrhea  - Date of symptom onset: 1/18/2022  - Date of positive COVID-19 test: 1/18/2022  Type of test: PCR  COVID-19 vaccination status: Not vaccinated    Ada Monterroso has been staying home and has isolated themselves in her home  She is taking care to not share personal items and is cleaning all surfaces that are touched often, like counters, tabletops, and doorknobs using household cleaning sprays or wipes  She is wearing a mask when she leaves her room       Lab Results   Component Value Date    SARSCOV2 Positive (A) 01/18/2022    SARSCOV2 Not Detected 12/14/2020     Past Medical History:   Diagnosis Date    Abnormal electrocardiogram     last assessed 1/8/16    Chronic kidney disease     GERD (gastroesophageal reflux disease)     Kidney stone     Primary osteoarthritis involving multiple joints 11/7/2019     Past Surgical History:   Procedure Laterality Date    EYE SURGERY      TONSILLECTOMY      TOOTH EXTRACTION      TUBAL LIGATION       Current Outpatient Medications   Medication Sig Dispense Refill    cholecalciferol (VITAMIN D) 400 units/mL Vitamin D      Alfalfa 250 MG TABS alfalfa (Patient not taking: Reported on 1/20/2022)      B Complex Vitamins (VITAMIN B COMPLEX 100 IJ) vitamin B complex (Patient not taking: Reported on 1/20/2022)      cimetidine (TAGAMET) 400 mg tablet Take 1 tablet (400 mg total) by mouth 2 (two) times a day (Patient not taking: Reported on 1/20/2022 ) 60 tablet 1    clotrimazole-betamethasone (LOTRISONE) 1-0 05 % cream Apply topically 2 (two) times a day (Patient not taking: Reported on 1/20/2022 ) 45 g 0    Coenzyme Q10 (COQ-10) 10 MG CAPS CoQ-10 (Patient not taking: Reported on 1/20/2022)      hydrOXYzine HCL (ATARAX) 25 mg tablet Take 1 tablet (25 mg total) by mouth every 6 (six) hours as needed for itching (Patient not taking: Reported on 1/20/2022 ) 30 tablet 1    ibuprofen (MOTRIN) 600 mg tablet Take 1 tablet (600 mg total) by mouth every 6 (six) hours as needed for mild pain (Patient not taking: Reported on 1/20/2022 ) 60 tablet 3    loratadine (CLARITIN) 10 mg tablet Take 1 tablet (10 mg total) by mouth daily (Patient not taking: Reported on 1/20/2022 ) 30 tablet 1    metroNIDAZOLE (METROCREAM) 0 75 % cream Apply topically 2 (two) times a day (Patient not taking: Reported on 1/20/2022 ) 45 g 5    metroNIDAZOLE (METROGEL) 0 75 % vaginal gel Insert 1 application into the vagina daily (Patient not taking: Reported on 1/20/2022 ) 70 g 0    nystatin-triamcinolone (MYCOLOG-II) ointment Apply topically 2 (two) times a day (Patient not taking: Reported on 1/20/2022 ) 30 g 0    nystatin-triamcinolone (MYCOLOG-II) ointment Apply topically 2 (two) times a day (Patient not taking: Reported on 1/20/2022 ) 30 g 3    olopatadine (PATANOL) 0 1 % ophthalmic solution Administer 1 drop to both eyes 2 (two) times a day (Patient not taking: Reported on 1/20/2022 ) 5 mL 0    Omega-3 Fatty Acids (FISH OIL) 1,000 mg Fish Oil (Patient not taking: Reported on 1/20/2022)      pantoprazole (PROTONIX) 20 mg tablet Take 20 mg by mouth daily (Patient not taking: Reported on 11/30/2021 )      pyridoxine (VITAMIN B6) 100 mg tablet Vitamin B-6 (Patient not taking: Reported on 1/20/2022)      triamcinolone (KENALOG) 0 1 % cream triamcinolone acetonide 0 1 % topical cream (Patient not taking: Reported on 1/20/2022)       No current facility-administered medications for this visit       Allergies   Allergen Reactions    Influenza Virus Vaccine Swelling    Furosemide  Doxycycline Rash       Review of Systems   Constitutional: Positive for activity change and chills  Negative for diaphoresis, fatigue and fever  HENT: Positive for congestion, postnasal drip, rhinorrhea and sore throat  Negative for ear discharge, ear pain, facial swelling, sinus pressure and sinus pain  No loss of taste/smell  Respiratory: Positive for cough and shortness of breath  Negative for chest tightness and wheezing  Cardiovascular: Negative for chest pain, palpitations and leg swelling  Gastrointestinal: Negative for abdominal pain, constipation, diarrhea, nausea and vomiting  Genitourinary: Negative for difficulty urinating, dysuria and frequency  Musculoskeletal: Positive for myalgias  Negative for arthralgias and gait problem  Neurological: Positive for headaches  Negative for light-headedness  Psychiatric/Behavioral: Negative for confusion  The patient is not nervous/anxious  Objective:    Vitals:       Physical Exam  Vitals and nursing note reviewed  Constitutional:       General: She is not in acute distress  Appearance: Normal appearance  She is not ill-appearing  HENT:      Head: Normocephalic and atraumatic  Mouth/Throat:      Mouth: Mucous membranes are moist    Eyes:      Extraocular Movements: Extraocular movements intact  Conjunctiva/sclera: Conjunctivae normal       Pupils: Pupils are equal, round, and reactive to light  Pulmonary:      Effort: Pulmonary effort is normal  No respiratory distress  Neurological:      General: No focal deficit present  Mental Status: She is alert and oriented to person, place, and time  Mental status is at baseline  Psychiatric:         Mood and Affect: Mood normal          Behavior: Behavior normal          Thought Content: Thought content normal          Judgment: Judgment normal          VIRTUAL VISIT DISCLAIMER    Dylanstalin Nichols verbally agrees to participate in [employer]   Pt is aware that Virtual Care Services could be limited without vital signs or the ability to perform a full hands-on physical Fernando Seaman understands she or the provider may request at any time to terminate the video visit and request the patient to seek care or treatment in person

## 2022-01-21 NOTE — PATIENT INSTRUCTIONS
How to Recover from COVID-19 at [facility]:   COVID-19 can cause a range of symptoms, from mild to severe  If you do not need to be treated in a hospital, you will be given instructions to use at home  You will need to watch for worsening symptoms and seek immediate care if needed  You will also need to stay physically apart from others so you do not spread the virus to anyone  Information about COVID-19 is still being learned  It is not known if a person can be infected with the virus again after recovering from COVID-19  It is also not known if or for how long the virus can continue to be passed to others  DISCHARGE INSTRUCTIONS:   If you think someone in your home may be infected,  do the following to protect others:  · If emergency care is needed,  tell the  about the possible infection, or call ahead and tell the emergency department  · Call a healthcare provider  for instructions if symptoms are mild  Anyone who may be infected should not  arrive without calling first  The provider will need to protect staff members and other patients  · The person who may be infected needs to wear a face covering  while getting medical care  This will help lower the risk of infecting others  Coverings are not used for anyone who is younger than 2 years, has breathing problems, or cannot remove it  The provider can give you instructions for anyone who cannot wear a covering  Call your local emergency number (911 in the [address] [place] [address],[address]) or an emergency department if:   · You have trouble breathing or shortness of breath at rest     · You have chest pain or pressure that lasts longer than 5 minutes  · You become confused or hard to wake  · Your lips or face are blue  · You have a fever of 104°F (40°C) or higher  Call your doctor if:   · You have new, returning, or worsening symptoms      · Someone in your home does not  have symptoms of COVID-19 but had close physical contact within 14 days with you     · You have questions or concerns about your condition or care  Medicines:   · NSAIDs , such as ibuprofen, help decrease swelling, pain, and fever  NSAIDs can cause stomach bleeding or kidney problems in certain people  If you take blood thinner medicine, always ask your healthcare provider if NSAIDs are safe for you  Always read the medicine label and follow directions  · Acetaminophen  decreases pain and fever  It is available without a doctor's order  Ask how much to take and how often to take it  Follow directions  Read the labels of all other medicines you are using to see if they also contain acetaminophen, or ask your doctor or pharmacist  Acetaminophen can cause liver damage if not taken correctly  Do not use more than 4 grams (4,000 milligrams) total of acetaminophen in one day  · Take your medicine as directed  Contact your healthcare provider if you think your medicine is not helping or if you have side effects  Tell him or her if you are allergic to any medicine  Keep a list of the medicines, vitamins, and herbs you take  Include the amounts, and when and why you take them  Bring the list or the pill bottles to follow-up visits  Carry your medicine list with you in case of an emergency  Self-care:  Mild symptoms may get better on their own  The following may be used to manage your symptoms:  · Decongestants  help reduce nasal congestion and help you breathe more easily  If you take decongestant pills, they may make you feel restless or cause problems with your sleep  Do not use decongestant sprays for more than a few days  · Cough suppressants  help reduce coughing  Ask your healthcare provider which type of cough medicine is best for you  · To soothe a sore throat,  gargle with warm salt water, or use throat lozenges or a throat spray  Your healthcare provider may recommend a cough medicine  Drink more liquids to thin and loosen mucus and to prevent dehydration   Use decongestants or saline drops as directed for nasal congestion  Keep others safe while you are recovering at home:  Healthcare providers will give you specific instructions to follow  The following are general guidelines to remind you how to keep others safe until you are well:     · Wash your hands often  Use soap and water as much as possible  You can use hand  that contains alcohol if soap and water are not available  Do not share towels with anyone  If you use paper towels, throw them away in a lined trash can kept in your room or area  Use a covered trash can, if possible  · Cover sneezes and coughs  Turn your face away and cover your mouth and nose with a tissue  Throw the tissue away  Use the bend of your arm if a tissue is not available  Then wash your hands well with soap and water or use hand   · Wear a face covering (mask) around anyone who does not live in your home  A covering will help prevent you from passing the virus to others  It is not known for sure if or for how long the virus can be passed after recovery  Do not  wear a plastic face shield instead of a covering  Use a cloth covering with at least 2 layers  You can also create layers by putting a cloth covering over a disposable non-medical mask  Cover your mouth and your nose  The covering should fit snugly against the bridge of your nose  Securely fasten it under your chin and on the sides of your face  A face covering is not a substitute for other safety measures  Continue social distancing and washing your hands often  · Do not go out of your home unless it is necessary  If possible, ask someone who is not infected to go out for groceries, medicines, and household items  Ask your healthcare provider for other ways to have appointments  Some providers offer phone, video, or other types of appointments   If you need to be seen in person, call ahead to make sure the office will be ready for you     · Do not let anyone into your home, room, or area unless it is necessary  If possible, stay in a separate area or room of your home if you live with others  No one should go into the area or room except to give you care  Only allow medical professionals or other necessary helpers in  Wear a face covering  Remind them to wear face coverings and to wash their hands  If possible, ask someone who is well to care for your baby  You can put breast milk in bottles for the person to use, if needed  Wear a clean face covering if you need to breastfeed or express or pump breast milk  Family members and friends should not visit you  You can visit with others by phone, video chat, e-mail, or similar systems  It is important to stay connected with others in your life while you recover  · Talk to your healthcare provider about your baby  Tell him or her if you have any questions or concerns about caring for or bonding with your baby  He or she will tell you when to bring your baby in for check-ups and vaccines  He or she will also tell you what to do if you think your baby was infected with the coronavirus  · Do not handle live animals unless it is necessary  Until more is known, it is best not to touch, play with, or handle live animals  Some animals, including pets, have been infected with the new coronavirus  Do not handle or care for animals until you are well  Care includes feeding, petting, and cuddling your pet  Do not let your pet lick you or share your food  Ask someone who is not infected to take care of your pet, if possible  If you must care for a pet, wear a face covering  Wash your hands before and after you give care  Talk to your healthcare provider about how to keep a service animal safe, if needed  · Follow directions from your healthcare provider for being around others after you recover  It is not known for sure if or for how long a recovered person can pass the virus to others   Your provider may give you instructions, such as continuing social distancing or wearing a face covering around others  The following are general guidelines for when you can be around others:    ? If you never developed any symptoms,  wait at least 10 days after your positive test  Your provider may want you to have 2 negative tests in a row at least 24 hours apart  This depends on how available testing is in your area  ? If you did have symptoms,  wait at least 10 days after the symptoms first appeared  Then you will need to have no fever for 24 hours without fever medicine  Most of your symptoms will also need to be gone  A loss of taste or smell may continue for several months  It is considered okay to be around others if this is your only symptom  ? If you were hospitalized for COVID-19 and needed oxygen,  your provider will tell you how long to wait  You may need to wait until 20 days after symptoms appeared  It may be less if you have 2 negative tests in a row at least 24 hours apart  This will depend on how available testing is in your area  Follow up with your doctor as directed:  Write down your questions so you remember to ask them during your visits  For more information:   · Centers for Disease Control and Prevention  [address] , [address]  Phone: 7- 131 - 276-1765  Web Address: [employer]    © [facility] 2021 Information is for End User's use only and may not be sold, redistributed or otherwise used for commercial purposes  All illustrations and images included in CareNotes® are the copyrighted property of A D A M , Inc  or [address] [place], [place] [address]johana   The above information is an  only  It is not intended as medical advice for individual conditions or treatments  Talk to your doctor, nurse or pharmacist before following any medical regimen to see if it is safe and effective for you

## 2022-01-24 ENCOUNTER — TELEMEDICINE (OUTPATIENT)
Dept: INTERNAL MEDICINE CLINIC | Facility: CLINIC | Age: 77
End: 2022-01-24
Payer: COMMERCIAL

## 2022-01-24 DIAGNOSIS — U07.1 LAB TEST POSITIVE FOR DETECTION OF COVID-19 VIRUS: Primary | ICD-10-CM

## 2022-01-24 DIAGNOSIS — Z71.89 EDUCATED ABOUT COVID-19 VIRUS INFECTION: ICD-10-CM

## 2022-01-24 DIAGNOSIS — J01.10 ACUTE NON-RECURRENT FRONTAL SINUSITIS: ICD-10-CM

## 2022-01-24 PROCEDURE — 99442 PR PHYS/QHP TELEPHONE EVALUATION 11-20 MIN: CPT | Performed by: INTERNAL MEDICINE

## 2022-01-24 RX ORDER — FLUTICASONE PROPIONATE 50 MCG
1 SPRAY, SUSPENSION (ML) NASAL DAILY
Qty: 16 G | Refills: 0 | Status: SHIPPED | OUTPATIENT
Start: 2022-01-24 | End: 2022-05-27

## 2022-01-24 RX ORDER — GUAIFENESIN 600 MG
600 TABLET, EXTENDED RELEASE 12 HR ORAL EVERY 12 HOURS SCHEDULED
Qty: 20 TABLET | Refills: 0 | Status: SHIPPED | OUTPATIENT
Start: 2022-01-24 | End: 2022-05-27

## 2022-01-24 RX ORDER — AZITHROMYCIN 250 MG/1
TABLET, FILM COATED ORAL
Qty: 6 TABLET | Refills: 0 | Status: SHIPPED | OUTPATIENT
Start: 2022-01-24 | End: 2022-01-29

## 2022-01-24 NOTE — PROGRESS NOTES
COVID-19 Outpatient Progress Note    Assessment/Plan:    Problem List Items Addressed This Visit     None      Visit Diagnoses     Lab test positive for detection of COVID-19 virus    -  Primary    Educated about COVID-19 virus infection        Acute non-recurrent frontal sinusitis        Relevant Medications    azithromycin (ZITHROMAX) 250 mg tablet    guaiFENesin (Mucinex) 600 mg 12 hr tablet    fluticasone (FLONASE) 50 mcg/act nasal spray         Disposition:     Patient has COVID-19 infection  Based off CDC guidelines, they were recommended to isolate for 5 days from the date of the positive test  If they remain asymptomatic, isolation may be ended followed by 5 days of wearing a mask when around othes to minimize risk of infecting others  If they have a fever, continue to stay home until fever resolves for at least 24 hours  I recommended continued isolation until at least 24 hours have passed since recovery defined as resolution of fever without the use of fever-reducing medications AND improvement in COVID symptoms AND 10 days have passed since onset of symptoms (or 10 days have passed since date of first positive viral diagnostic test for asymptomatic patients)  A/P: Day # 7 since onset of COVID s/s  Covid test was positive  Doing a little better  Mainly Nasal congestion  NO fever or SOB    Can lift isolation/quarantine, recommend continuing increase fluids, PRN motrin/tylenol, and breathing exercises  Will send in an abx, mucinex, and INS for the sinuses  RTC as scheduled for f/u  I have spent 15 minutes directly with the patient  Greater than 50% of this time was spent in counseling/coordination of care regarding: prognosis, risks and benefits of treatment options, instructions for management, patient and family education, importance of treatment compliance, risk factor reductions and impressions        Encounter provider Jessica Neves DO    Provider located at [facility]  [address] [facility] [id number]    Recent Visits  Date Type Provider Dept   01/21/22 Telemedicine Glo Body, DO [facility]   01/20/22 Telemedicine Glo Body, DO [facility]   01/18/22 Telephone Jesus Pat [facility]   Showing recent visits within past 7 days and meeting all other requirements  Today's Visits  Date Type Provider Dept   01/24/22 Telemedicine Glo Body, DO [facility] today's visits and meeting all other requirements  Future Appointments  No visits were found meeting these conditions  Showing future appointments within next 150 days and meeting all other requirements     This virtual check-in was done via telephone and she agrees to proceed  Patient agrees to participate in a virtual check in via telephone or video visit instead of presenting to the office to address urgent/immediate medical needs  Patient is aware this is a billable service  After connecting through Telephone, the patient was identified by name and date of birth  Barb Wood was informed that this was a telemedicine visit and that the exam was being conducted confidentially over secure lines  My office door was closed  No one else was in the room  Barb Wood acknowledged consent and understanding of privacy and security of the telemedicine visit  I informed the patient that I have reviewed her record in Epic and presented the opportunity for her to ask any questions regarding the visit today  The patient agreed to participate  It was my intent to perform this visit via video technology but the patient was not able to do a video connection so the visit was completed via audio telephone only  Verification of patient location:  Patient is located in the following state in which I hold an active license: [place]    Subjective:   Barb Wood is a 68 y o  female who has been screened for COVID-19   Symptom change since last report: improving  Patient's symptoms include nasal congestion, rhinorrhea, cough, myalgias and headache  Patient denies fever, chills, fatigue, sore throat, anosmia, loss of taste, shortness of breath, chest tightness, abdominal pain, nausea, vomiting and diarrhea  - Date of symptom onset: 1/18/2022  - Date of positive COVID-19 test: 1/18/2022  Type of test: PCR  COVID-19 vaccination status: Not vaccinated    Santino Mccray has been staying home and has isolated themselves in her home  She is taking care to not share personal items and is cleaning all surfaces that are touched often, like counters, tabletops, and doorknobs using household cleaning sprays or wipes  She is wearing a mask when she leaves her room       Lab Results   Component Value Date    SARSCOV2 Positive (A) 01/18/2022    SARSCOV2 Not Detected 12/14/2020     Past Medical History:   Diagnosis Date    Abnormal electrocardiogram     last assessed 1/8/16    Chronic kidney disease     GERD (gastroesophageal reflux disease)     Kidney stone     Primary osteoarthritis involving multiple joints 11/7/2019     Past Surgical History:   Procedure Laterality Date    EYE SURGERY      TONSILLECTOMY      TOOTH EXTRACTION      TUBAL LIGATION       Current Outpatient Medications   Medication Sig Dispense Refill    [place] 250 MG TABS alfalfa (Patient not taking: Reported on 1/20/2022)      azithromycin (ZITHROMAX) 250 mg tablet Take 2 tablets today then 1 tablet daily x 4 days 6 tablet 0    B Complex Vitamins (VITAMIN B COMPLEX 100 IJ) vitamin B complex (Patient not taking: Reported on 1/20/2022)      cholecalciferol (VITAMIN D) 400 units/mL Vitamin D      cimetidine (TAGAMET) 400 mg tablet Take 1 tablet (400 mg total) by mouth 2 (two) times a day (Patient not taking: Reported on 1/20/2022 ) 60 tablet 1    clotrimazole-betamethasone (LOTRISONE) 1-0 05 % cream Apply topically 2 (two) times a day (Patient not taking: Reported on 1/20/2022 ) 45 g 0    Coenzyme Q10 (COQ-10) 10 MG CAPS CoQ-10 (Patient not taking: Reported on 1/20/2022)      fluticasone (FLONASE) 50 mcg/act nasal spray 1 spray into each nostril daily 16 g 0    guaiFENesin (Mucinex) 600 mg 12 hr tablet Take 1 tablet (600 mg total) by mouth every 12 (twelve) hours 20 tablet 0    hydrOXYzine HCL (ATARAX) 25 mg tablet Take 1 tablet (25 mg total) by mouth every 6 (six) hours as needed for itching (Patient not taking: Reported on 1/20/2022 ) 30 tablet 1    ibuprofen (MOTRIN) 600 mg tablet Take 1 tablet (600 mg total) by mouth every 6 (six) hours as needed for mild pain (Patient not taking: Reported on 1/20/2022 ) 60 tablet 3    loratadine (CLARITIN) 10 mg tablet Take 1 tablet (10 mg total) by mouth daily (Patient not taking: Reported on 1/20/2022 ) 30 tablet 1    metroNIDAZOLE (METROCREAM) 0 75 % cream Apply topically 2 (two) times a day (Patient not taking: Reported on 1/20/2022 ) 45 g 5    metroNIDAZOLE (METROGEL) 0 75 % vaginal gel Insert 1 application into the vagina daily (Patient not taking: Reported on 1/20/2022 ) 70 g 0    nystatin-triamcinolone (MYCOLOG-II) ointment Apply topically 2 (two) times a day (Patient not taking: Reported on 1/20/2022 ) 30 g 0    nystatin-triamcinolone (MYCOLOG-II) ointment Apply topically 2 (two) times a day (Patient not taking: Reported on 1/20/2022 ) 30 g 3    olopatadine (PATANOL) 0 1 % ophthalmic solution Administer 1 drop to both eyes 2 (two) times a day (Patient not taking: Reported on 1/20/2022 ) 5 mL 0    Omega-3 Fatty Acids (FISH OIL) 1,000 mg Fish Oil (Patient not taking: Reported on 1/20/2022)      pantoprazole (PROTONIX) 20 mg tablet Take 20 mg by mouth daily (Patient not taking: Reported on 11/30/2021 )      pyridoxine (VITAMIN B6) 100 mg tablet Vitamin B-6 (Patient not taking: Reported on 1/20/2022)      triamcinolone (KENALOG) 0 1 % cream triamcinolone acetonide 0 1 % topical cream (Patient not taking: Reported on 1/20/2022) No current facility-administered medications for this visit  Allergies   Allergen Reactions    Influenza Virus Vaccine Swelling    Furosemide     Doxycycline Rash       Review of Systems   Constitutional: Positive for activity change  Negative for chills, diaphoresis, fatigue and fever  HENT: Positive for congestion, postnasal drip and rhinorrhea  Negative for ear discharge, ear pain, facial swelling, sinus pressure, sinus pain and sore throat  No loss of taste/smell  Respiratory: Positive for cough  Negative for chest tightness, shortness of breath and wheezing  Cardiovascular: Negative for chest pain, palpitations and leg swelling  Gastrointestinal: Negative for abdominal pain, constipation, diarrhea, nausea and vomiting  Genitourinary: Negative for difficulty urinating, dysuria and frequency  Musculoskeletal: Positive for myalgias  Negative for arthralgias and gait problem  Neurological: Positive for headaches  Negative for light-headedness  Psychiatric/Behavioral: Negative for confusion  The patient is not nervous/anxious  Objective:    Vitals:       Physical Exam  Vitals and nursing note reviewed  Constitutional:       General: She is not in acute distress  Pulmonary:      Effort: Pulmonary effort is normal  No respiratory distress  Neurological:      General: No focal deficit present  Mental Status: She is alert and oriented to person, place, and time  Mental status is at baseline  Psychiatric:         Mood and Affect: Mood normal          Behavior: Behavior normal          Thought Content: Thought content normal          Judgment: Judgment normal          VIRTUAL VISIT DISCLAIMER    Joceline Garibay verbally agrees to participate in [employer]   Pt is aware that [employer] could be limited without vital signs or the ability to perform a full hands-on physical Samantha Min understands she or the provider may request at any time to terminate the video visit and request the patient to seek care or treatment in person

## 2022-01-24 NOTE — PATIENT INSTRUCTIONS
How to Recover from COVID-19 at [facility]:   COVID-19 can cause a range of symptoms, from mild to severe  If you do not need to be treated in a hospital, you will be given instructions to use at home  You will need to watch for worsening symptoms and seek immediate care if needed  You will also need to stay physically apart from others so you do not spread the virus to anyone  Information about COVID-19 is still being learned  It is not known if a person can be infected with the virus again after recovering from COVID-19  It is also not known if or for how long the virus can continue to be passed to others  DISCHARGE INSTRUCTIONS:   If you think someone in your home may be infected,  do the following to protect others:  · If emergency care is needed,  tell the  about the possible infection, or call ahead and tell the emergency department  · Call a healthcare provider  for instructions if symptoms are mild  Anyone who may be infected should not  arrive without calling first  The provider will need to protect staff members and other patients  · The person who may be infected needs to wear a face covering  while getting medical care  This will help lower the risk of infecting others  Coverings are not used for anyone who is younger than 2 years, has breathing problems, or cannot remove it  The provider can give you instructions for anyone who cannot wear a covering  Call your local emergency number (911 in the [address] [place] [address],[address]) or an emergency department if:   · You have trouble breathing or shortness of breath at rest     · You have chest pain or pressure that lasts longer than 5 minutes  · You become confused or hard to wake  · Your lips or face are blue  · You have a fever of 104°F (40°C) or higher  Call your doctor if:   · You have new, returning, or worsening symptoms      · Someone in your home does not  have symptoms of COVID-19 but had close physical contact within 14 days with you     · You have questions or concerns about your condition or care  Medicines:   · NSAIDs , such as ibuprofen, help decrease swelling, pain, and fever  NSAIDs can cause stomach bleeding or kidney problems in certain people  If you take blood thinner medicine, always ask your healthcare provider if NSAIDs are safe for you  Always read the medicine label and follow directions  · Acetaminophen  decreases pain and fever  It is available without a doctor's order  Ask how much to take and how often to take it  Follow directions  Read the labels of all other medicines you are using to see if they also contain acetaminophen, or ask your doctor or pharmacist  Acetaminophen can cause liver damage if not taken correctly  Do not use more than 4 grams (4,000 milligrams) total of acetaminophen in one day  · Take your medicine as directed  Contact your healthcare provider if you think your medicine is not helping or if you have side effects  Tell him or her if you are allergic to any medicine  Keep a list of the medicines, vitamins, and herbs you take  Include the amounts, and when and why you take them  Bring the list or the pill bottles to follow-up visits  Carry your medicine list with you in case of an emergency  Self-care:  Mild symptoms may get better on their own  The following may be used to manage your symptoms:  · Decongestants  help reduce nasal congestion and help you breathe more easily  If you take decongestant pills, they may make you feel restless or cause problems with your sleep  Do not use decongestant sprays for more than a few days  · Cough suppressants  help reduce coughing  Ask your healthcare provider which type of cough medicine is best for you  · To soothe a sore throat,  gargle with warm salt water, or use throat lozenges or a throat spray  Your healthcare provider may recommend a cough medicine  Drink more liquids to thin and loosen mucus and to prevent dehydration   Use decongestants or saline drops as directed for nasal congestion  Keep others safe while you are recovering at home:  Healthcare providers will give you specific instructions to follow  The following are general guidelines to remind you how to keep others safe until you are well:     · Wash your hands often  Use soap and water as much as possible  You can use hand  that contains alcohol if soap and water are not available  Do not share towels with anyone  If you use paper towels, throw them away in a lined trash can kept in your room or area  Use a covered trash can, if possible  · Cover sneezes and coughs  Turn your face away and cover your mouth and nose with a tissue  Throw the tissue away  Use the bend of your arm if a tissue is not available  Then wash your hands well with soap and water or use hand   · Wear a face covering (mask) around anyone who does not live in your home  A covering will help prevent you from passing the virus to others  It is not known for sure if or for how long the virus can be passed after recovery  Do not  wear a plastic face shield instead of a covering  Use a cloth covering with at least 2 layers  You can also create layers by putting a cloth covering over a disposable non-medical mask  Cover your mouth and your nose  The covering should fit snugly against the bridge of your nose  Securely fasten it under your chin and on the sides of your face  A face covering is not a substitute for other safety measures  Continue social distancing and washing your hands often  · Do not go out of your home unless it is necessary  If possible, ask someone who is not infected to go out for groceries, medicines, and household items  Ask your healthcare provider for other ways to have appointments  Some providers offer phone, video, or other types of appointments   If you need to be seen in person, call ahead to make sure the office will be ready for you     · Do not let anyone into your home, room, or area unless it is necessary  If possible, stay in a separate area or room of your home if you live with others  No one should go into the area or room except to give you care  Only allow medical professionals or other necessary helpers in  Wear a face covering  Remind them to wear face coverings and to wash their hands  If possible, ask someone who is well to care for your baby  You can put breast milk in bottles for the person to use, if needed  Wear a clean face covering if you need to breastfeed or express or pump breast milk  Family members and friends should not visit you  You can visit with others by phone, video chat, e-mail, or similar systems  It is important to stay connected with others in your life while you recover  · Talk to your healthcare provider about your baby  Tell him or her if you have any questions or concerns about caring for or bonding with your baby  He or she will tell you when to bring your baby in for check-ups and vaccines  He or she will also tell you what to do if you think your baby was infected with the coronavirus  · Do not handle live animals unless it is necessary  Until more is known, it is best not to touch, play with, or handle live animals  Some animals, including pets, have been infected with the new coronavirus  Do not handle or care for animals until you are well  Care includes feeding, petting, and cuddling your pet  Do not let your pet lick you or share your food  Ask someone who is not infected to take care of your pet, if possible  If you must care for a pet, wear a face covering  Wash your hands before and after you give care  Talk to your healthcare provider about how to keep a service animal safe, if needed  · Follow directions from your healthcare provider for being around others after you recover  It is not known for sure if or for how long a recovered person can pass the virus to others   Your provider may give you instructions, such as continuing social distancing or wearing a face covering around others  The following are general guidelines for when you can be around others:    ? If you never developed any symptoms,  wait at least 10 days after your positive test  Your provider may want you to have 2 negative tests in a row at least 24 hours apart  This depends on how available testing is in your area  ? If you did have symptoms,  wait at least 10 days after the symptoms first appeared  Then you will need to have no fever for 24 hours without fever medicine  Most of your symptoms will also need to be gone  A loss of taste or smell may continue for several months  It is considered okay to be around others if this is your only symptom  ? If you were hospitalized for COVID-19 and needed oxygen,  your provider will tell you how long to wait  You may need to wait until 20 days after symptoms appeared  It may be less if you have 2 negative tests in a row at least 24 hours apart  This will depend on how available testing is in your area  Follow up with your doctor as directed:  Write down your questions so you remember to ask them during your visits  For more information:   · Centers for Disease Control and Prevention  [address] , [address]  Phone: 7- 203 - 842-6621  Web Address: [employer]    © [facility] 2021 Information is for End User's use only and may not be sold, redistributed or otherwise used for commercial purposes  All illustrations and images included in CareNotes® are the copyrighted property of A D A M , Inc  or [address] [place], [place] [address]johana   The above information is an  only  It is not intended as medical advice for individual conditions or treatments  Talk to your doctor, nurse or pharmacist before following any medical regimen to see if it is safe and effective for you

## 2022-05-27 ENCOUNTER — OFFICE VISIT (OUTPATIENT)
Dept: INTERNAL MEDICINE CLINIC | Facility: CLINIC | Age: 77
End: 2022-05-27
Payer: COMMERCIAL

## 2022-05-27 VITALS
BODY MASS INDEX: 34.19 KG/M2 | DIASTOLIC BLOOD PRESSURE: 70 MMHG | HEART RATE: 74 BPM | SYSTOLIC BLOOD PRESSURE: 130 MMHG | OXYGEN SATURATION: 99 % | TEMPERATURE: 98.4 F | HEIGHT: 59 IN | WEIGHT: 169.6 LBS

## 2022-05-27 DIAGNOSIS — Z12.31 ENCOUNTER FOR SCREENING MAMMOGRAM FOR MALIGNANT NEOPLASM OF BREAST: ICD-10-CM

## 2022-05-27 DIAGNOSIS — K21.9 GERD WITHOUT ESOPHAGITIS: ICD-10-CM

## 2022-05-27 DIAGNOSIS — Z13.29 SCREENING FOR THYROID DISORDER: ICD-10-CM

## 2022-05-27 DIAGNOSIS — Z00.00 MEDICARE ANNUAL WELLNESS VISIT, SUBSEQUENT: ICD-10-CM

## 2022-05-27 DIAGNOSIS — I87.2 VENOUS INSUFFICIENCY OF BOTH LOWER EXTREMITIES: ICD-10-CM

## 2022-05-27 DIAGNOSIS — Z13.1 SCREENING FOR DIABETES MELLITUS (DM): ICD-10-CM

## 2022-05-27 DIAGNOSIS — E28.39 MENOPAUSE OVARIAN FAILURE: ICD-10-CM

## 2022-05-27 DIAGNOSIS — Z23 ENCOUNTER FOR VACCINATION: ICD-10-CM

## 2022-05-27 DIAGNOSIS — E78.2 MIXED HYPERLIPIDEMIA: Primary | ICD-10-CM

## 2022-05-27 DIAGNOSIS — E55.9 VITAMIN D DEFICIENCY: ICD-10-CM

## 2022-05-27 DIAGNOSIS — G89.29 CHRONIC LOW BACK PAIN, UNSPECIFIED BACK PAIN LATERALITY, UNSPECIFIED WHETHER SCIATICA PRESENT: ICD-10-CM

## 2022-05-27 DIAGNOSIS — M54.50 CHRONIC LOW BACK PAIN, UNSPECIFIED BACK PAIN LATERALITY, UNSPECIFIED WHETHER SCIATICA PRESENT: ICD-10-CM

## 2022-05-27 PROCEDURE — 99214 OFFICE O/P EST MOD 30 MIN: CPT | Performed by: INTERNAL MEDICINE

## 2022-05-27 PROCEDURE — G0439 PPPS, SUBSEQ VISIT: HCPCS | Performed by: INTERNAL MEDICINE

## 2022-05-27 NOTE — PROGRESS NOTES
Assessment and Plan:     Problem List Items Addressed This Visit        Digestive    GERD without esophagitis    Relevant Orders    CBC and differential       Cardiovascular and Mediastinum    Venous insufficiency of both lower extremities    Relevant Orders    CBC and differential       Other    Vitamin D deficiency    Hyperlipidemia - Primary    Relevant Orders    Comprehensive metabolic panel    Lipid Panel with Direct LDL reflex    TSH, 3rd generation with Free T4 reflex    Chronic lower back pain      Other Visit Diagnoses     Screening for diabetes mellitus (DM)        Relevant Orders    Hemoglobin A1C    Screening for thyroid disorder        Relevant Orders    TSH, 3rd generation with Free T4 reflex    Encounter for screening mammogram for malignant neoplasm of breast        Relevant Orders    Mammo screening bilateral w 3d & cad    Medicare annual wellness visit, subsequent        Encounter for vaccination        Menopause ovarian failure        Relevant Orders    DXA bone density spine hip and pelvis           Preventive health issues were discussed with patient, and age appropriate screening tests were ordered as noted in patient's After Visit Summary  Personalized health advice and appropriate referrals for health education or preventive services given if needed, as noted in patient's After Visit Summary       History of Present Illness:     Patient presents for Medicare Annual Wellness visit    Patient Care Team:  Dion Napoles DO as PCP - General (Internal Medicine)  Dion Napoles DO as PCP - [address] [place] [address] (RTE)     Problem List:     Patient Active Problem List   Diagnosis    Chronic lower back pain    Chronic right shoulder pain    GERD without esophagitis    Hyperlipidemia    Nephrolithiasis    Sciatica of left side    Vitamin D deficiency    Primary osteoarthritis involving multiple joints    Venous insufficiency of both lower extremities    Diastasis recti      Past Medical and Surgical History:     Past Medical History:   Diagnosis Date    Abnormal electrocardiogram     last assessed 1/8/16    Chronic kidney disease     GERD (gastroesophageal reflux disease)     Kidney stone     Primary osteoarthritis involving multiple joints 11/7/2019     Past Surgical History:   Procedure Laterality Date    EYE SURGERY      TONSILLECTOMY      TOOTH EXTRACTION      TUBAL LIGATION        Family History:     Family History   Problem Relation Age of Onset    Other Mother         sheehans syndrome    Aneurysm Father     Nephrolithiasis Father     Hypertension Sister     Cirrhosis Brother         alcoholic liver    No Known Problems Daughter     No Known Problems Sister     No Known Problems Daughter     No Known Problems Daughter     Other Daughter     No Known Problems Maternal Aunt     No Known Problems Maternal Grandmother     No Known Problems Paternal Grandmother       Social History:     Social History     Socioeconomic History    Marital status: /Civil Union     Spouse name: None    Number of children: None    Years of education: None    Highest education level: None   Occupational History    Occupation: Retired   Tobacco Use    Smoking status: Former Smoker    Smokeless tobacco: Never Used   Vaping Use    Vaping Use: Never used   Substance and Sexual Activity    Alcohol use: Yes     Comment: occasional    Drug use: No    Sexual activity: Not Currently   Other Topics Concern    None   Social History Narrative    RETIRED     Social Determinants of Health     Financial Resource Strain: Not on file   Food Insecurity: Not on file   Transportation Needs: Not on file   Physical Activity: Not on file   Stress: Not on file   Social Connections: Not on file   Intimate Partner Violence: Not on file   Housing Stability: Not on file      Medications and Allergies:     Current Outpatient Medications   Medication Sig Dispense Refill    cholecalciferol (VITAMIN D) 400 units/mL Take 400 Units by mouth daily      Multiple Minerals-Vitamins (CALCIUM-MAGNESIUM-ZINC-D3 PO) Take 1 tablet by mouth in the morning      metroNIDAZOLE (METROGEL) 0 75 % vaginal gel Insert 1 application into the vagina daily (Patient not taking: No sig reported) 70 g 0    nystatin-triamcinolone (MYCOLOG-II) ointment Apply topically 2 (two) times a day (Patient not taking: No sig reported) 30 g 0    nystatin-triamcinolone (MYCOLOG-II) ointment Apply topically 2 (two) times a day (Patient not taking: No sig reported) 30 g 3    olopatadine (PATANOL) 0 1 % ophthalmic solution Administer 1 drop to both eyes 2 (two) times a day (Patient not taking: No sig reported) 5 mL 0    Omega-3 Fatty Acids (FISH OIL) 1,000 mg Fish Oil (Patient not taking: No sig reported)      pantoprazole (PROTONIX) 20 mg tablet Take 20 mg by mouth daily (Patient not taking: No sig reported)      pyridoxine (VITAMIN B6) 100 mg tablet Vitamin B-6 (Patient not taking: No sig reported)      triamcinolone (KENALOG) 0 1 % cream triamcinolone acetonide 0 1 % topical cream (Patient not taking: No sig reported)       No current facility-administered medications for this visit  Allergies   Allergen Reactions    Influenza Virus Vaccine Swelling    Furosemide     Doxycycline Rash      Immunizations: There is no immunization history on file for this patient  Health Maintenance:         Topic Date Due    Breast Cancer Screening: Mammogram  10/06/2021    Hepatitis C Screening  Completed         Topic Date Due    COVID-19 Vaccine (1) Never done    DTaP,Tdap,and Td Vaccines (1 - Tdap) Never done    Pneumococcal Vaccine: 65+ Years (1 - PCV) Never done      Medicare Health Risk Assessment:     /70   Pulse 74   Temp 98 4 °F (36 9 °C) (Tympanic)   Ht 4' 11" (1 499 m)   Wt 76 9 kg (169 lb 9 6 oz)   SpO2 99%   BMI 34 26 kg/m²      Brooke Booker is here for her Subsequent Wellness visit   Last Medicare Wellness visit information reviewed, patient interviewed and updates made to the record today  Health Risk Assessment:   Patient rates overall health as very good  Patient feels that their physical health rating is slightly worse  Patient is very satisfied with their life  Eyesight was rated as same  Hearing was rated as slightly worse  Patient feels that their emotional and mental health rating is same  Patients states they are never, rarely angry  Patient states they are never, rarely unusually tired/fatigued  Pain experienced in the last 7 days has been a lot  Patient's pain rating has been 7/10  Patient states that she has experienced no weight loss or gain in last 6 months  Depression Screening:   PHQ-2 Score: 0      Fall Risk Screening: In the past year, patient has experienced: no history of falling in past year      Urinary Incontinence Screening:   Patient has leaked urine accidently in the last six months  Home Safety:  Patient has trouble with stairs inside or outside of their home  Patient has working smoke alarms and has working carbon monoxide detector  Home safety hazards include: none  Nutrition:   Current diet is Regular  Medications:   Patient is currently taking over-the-counter supplements  OTC medications include: see medication list  Patient is able to manage medications  Activities of Daily Living (ADLs)/Instrumental Activities of Daily Living (IADLs):   Walk and transfer into and out of bed and chair?: Yes  Dress and groom yourself?: Yes    Bathe or shower yourself?: Yes    Feed yourself? Yes  Do your laundry/housekeeping?: Yes  Manage your money, pay your bills and track your expenses?: Yes  Make your own meals?: Yes    Do your own shopping?: Yes    Previous Hospitalizations:   Any hospitalizations or ED visits within the last 12 months?: No      Advance Care Planning:   Living will: Yes    Durable POA for healthcare: Yes    Advanced directive: Yes    Advanced directive counseling given:  Yes Five wishes given: No    Patient declined ACP directive: No    End of Life Decisions reviewed with patient: Yes    Provider agrees with end of life decisions: Yes      Cognitive Screening:   Provider or family/friend/caregiver concerned regarding cognition?: No    PREVENTIVE SCREENINGS      Cardiovascular Screening:    General: Screening Not Indicated and History Lipid Disorder    Due for: Lipid Panel      Diabetes Screening:     General: Screening Current and Risks and Benefits Discussed    Due for: Blood Glucose      Colorectal Cancer Screening:     General: Risks and Benefits Discussed and Patient Declines      Breast Cancer Screening:     General: Screening Current    Due for: Mammogram        Cervical Cancer Screening:    General: Screening Not Indicated      Osteoporosis Screening:      Due for: DXA Appendicular      Abdominal Aortic Aneurysm (AAA) Screening:        General: Screening Not Indicated      Lung Cancer Screening:     General: Screening Not Indicated      Hepatitis C Screening:    General: Screening Current    Screening, Brief Intervention, and Referral to Treatment (SBIRT)    Screening  Typical number of drinks in a day: 0  Typical number of drinks in a week: 0  Interpretation: Low risk drinking behavior  Single Item Drug Screening:  How often have you used an illegal drug (including marijuana) or a prescription medication for non-medical reasons in the past year? never    Single Item Drug Screen Score: 0  Interpretation: Negative screen for possible drug use disorder    Other Counseling Topics:   Car/seat belt/driving safety, sunscreen and calcium and vitamin D intake and regular weightbearing exercise  A/P: Doing well and no falls reported  Denies depression and feels safe at home  Diverse diet  No problems operating a MV and uses seat belts  Has a living will and POA  No DME or referrals needed today  RTC one year for medicare wellness       Jori Baldwin,

## 2022-05-27 NOTE — PATIENT INSTRUCTIONS
Obesity   AMBULATORY CARE:   Obesity  means your body mass index (BMI) is greater than 30  Your healthcare provider will use your height and weight to measure your BMI  The risks of obesity include  many health problems, including injuries or physical disability  · Diabetes (high blood sugar level)    · High blood pressure or high cholesterol    · Heart disease    · Stroke    · Gallbladder or liver disease    · Cancer of the colon, breast, prostate, liver, or kidney    · Sleep apnea    · Arthritis or gout    Screening  is done to check for health conditions before you have signs or symptoms  If you are 28to 79years old, your blood sugar level may be checked every 3 years for signs of prediabetes or diabetes  Your healthcare provider will check your blood pressure at each visit  High blood pressure can lead to a stroke or other problems  Your provider may check for signs of heart disease, cancer, or other health problems  Seek care immediately if:   · You have a severe headache, confusion, or difficulty speaking  · You have weakness on one side of your body  · You have chest pain, sweating, or shortness of breath  Call your doctor if:   · You have symptoms of gallbladder or liver disease, such as pain in your upper abdomen  · You have knee or hip pain and discomfort while walking  · You have symptoms of diabetes, such as intense hunger and thirst, and frequent urination  · You have symptoms of sleep apnea, such as snoring or daytime sleepiness  · You have questions or concerns about your condition or care  Treatment for obesity  focuses on helping you lose weight to improve your health  Even a small decrease in BMI can reduce the risk for many health problems  Your healthcare provider will help you set a weight-loss goal   · Lifestyle changes  are the first step in treating obesity  These include making healthy food choices and getting regular physical activity   Your healthcare provider may suggest a weight-loss program that involves coaching, education, and therapy  · Medicine  may help you lose weight when it is used with a healthy foods and physical activity  · Surgery  can help you lose weight if you are very obese and have other health problems  There are several types of weight-loss surgery  Ask your healthcare provider for more information  Tips for safe weight loss:   · Set small, realistic goals  An example of a small goal is to walk for 20 minutes 5 days a week  Anther goal is to lose 5% of your body weight  · Tell friends, family members, and coworkers about your goals  and ask for their support  Ask a friend to lose weight with you, or join a weight-loss support group  · Identify foods or triggers that may cause you to overeat , and find ways to avoid them  Remove tempting high-calorie foods from your home and workplace  Place a bowl of fresh fruit on your kitchen counter  If stress causes you to eat, then find other ways to cope with stress  A counselor or therapist may be able to help you  · Keep a diary to track what you eat and drink  Also write down how many minutes of physical activity you do each day  Weigh yourself once a week and record it in your diary  Eating changes: You will need to eat 500 to 1,000 fewer calories each day than you currently eat to lose 1 to 2 pounds a week  The following changes will help you cut calories:  · Eat smaller portions  Use small plates, no larger than 9 inches in diameter  Fill your plate half full of fruits and vegetables  Measure your food using measuring cups until you know what a serving size looks like  · Eat 3 meals and 1 or 2 snacks each day  Plan your meals in advance  Alicia Sears and eat at home most of the time  Eat slowly  Do not skip meals  Skipping meals can lead to overeating later in the day  This can make it harder for you to lose weight   Talk with a dietitian to help you make a meal plan and schedule that is right for you  · Eat fruits and vegetables at every meal   They are low in calories and high in fiber, which makes you feel full  Do not add butter, margarine, or cream sauce to vegetables  Use herbs to season steamed vegetables  · Eat less fat and fewer fried foods  Eat more baked or grilled chicken and fish  These protein sources are lower in calories and fat than red meat  Limit fast food  Dress your salads with olive oil and vinegar instead of bottled dressing  · Limit the amount of sugar you eat  Do not drink sugary beverages  Limit alcohol  Activity changes:  Physical activity is good for your body in many ways  It helps you burn calories and build strong muscles  It decreases stress and depression, and improves your mood  It can also help you sleep better  Talk to your healthcare provider before you begin an exercise program   · Exercise for at least 30 minutes 5 days a week  Start slowly  Set aside time each day for physical activity that you enjoy and that is convenient for you  It is best to do both weight training and an activity that increases your heart rate, such as walking, bicycling, or swimming  · Find ways to be more active  Do yard work and housecleaning  Walk up the stairs instead of using elevators  Spend your leisure time going to events that require walking, such as outdoor festivals or fairs  This extra physical activity can help you lose weight and keep it off  Follow up with your doctor as directed: You may need to meet with a dietitian  Write down your questions so you remember to ask them during your visits  © Copyright [employer] 2022 Information is for End User's use only and may not be sold, redistributed or otherwise used for commercial purposes  All illustrations and images included in CareNotes® are the copyrighted property of [employer]  or Jerrica Fenton   The above information is an  only   It is not intended as medical advice for individual conditions or treatments  Talk to your doctor, nurse or pharmacist before following any medical regimen to see if it is safe and effective for you  Low Fat Diet   AMBULATORY CARE:   A low-fat diet  is an eating plan that is low in total fat, unhealthy fat, and cholesterol  You may need to follow a low-fat diet if you have trouble digesting or absorbing fat  You may also need to follow this diet if you have high cholesterol  You can also lower your cholesterol by increasing the amount of fiber in your diet  Soluble fiber is a type of fiber that helps to decrease cholesterol levels  Different types of fat in food:   · Limit unhealthy fats  A diet that is high in cholesterol, saturated fat, and trans fat may cause unhealthy cholesterol levels  Unhealthy cholesterol levels increase your risk of heart disease  ? Cholesterol:  Limit intake of cholesterol to less than 200 mg per day  Cholesterol is found in meat, eggs, and dairy  ? Saturated fat:  Limit saturated fat to less than 7% of your total daily calories  Ask your dietitian how many calories you need each day  Saturated fat is found in butter, cheese, ice cream, whole milk, and palm oil  Saturated fat is also found in meat, such as beef, pork, chicken skin, and processed meats  Processed meats include sausage, hot dogs, and bologna  ? Trans fat:  Avoid trans fat as much as possible  Trans fat is used in fried and baked foods  Foods that say trans fat free on the label may still have up to 0 5 grams of trans fat per serving  · Include healthy fats  Replace foods that are high in saturated and trans fat with foods high in healthy fats  This may help to decrease high cholesterol levels  ? Monounsaturated fats: These are found in avocados, nuts, and vegetable oils, such as olive, canola, and sunflower oil  ? Polyunsaturated fats: These can be found in vegetable oils, such as soybean or corn oil   Omega-3 fats can help to decrease the risk of heart disease  Omega-3 fats are found in fish, such as salmon, herring, trout, and tuna  Omega-3 fats can also be found in plant foods, such as walnuts, flaxseed, soybeans, and canola oil  Foods to limit or avoid:   · Grains:      ? Snacks that are made with partially hydrogenated oils, such as chips, regular crackers, and butter-flavored popcorn    ? High-fat baked goods, such as biscuits, croissants, doughnuts, pies, cookies, and pastries    · Dairy:      ? Whole milk, 2% milk, and yogurt and ice cream made with whole milk    ? Half and half creamer, heavy cream, and whipping cream    ? Cheese, cream cheese, and sour cream    · Meats and proteins:      ? High-fat cuts of meat (T-bone steak, regular hamburger, and ribs)    ? Fried meat, poultry (turkey and chicken), and fish    ? Poultry (chicken and turkey) with skin    ? Cold cuts (salami or bologna), hot dogs, wolf, and sausage    ? Whole eggs and egg yolks    · Vegetables and fruits with added fat:      ? Fried vegetables or vegetables in butter or high-fat sauces, such as cream or cheese sauces    ? Fried fruit or fruit served with butter or cream    · Fats:      ? Butter, stick margarine, and shortening    ? Coconut, palm oil, and palm kernel oil    Foods to include:   · Grains:      ? Whole-grain breads, cereals, pasta, and brown rice    ? Low-fat crackers and pretzels    · Vegetables and fruits:      ? Fresh, frozen, or canned vegetables (no salt or low-sodium)    ? Fresh, frozen, dried, or canned fruit (canned in light syrup or fruit juice)    ? Avocado    · Low-fat dairy products:      ? Nonfat (skim) or 1% milk    ? Nonfat or low-fat cheese, yogurt, and cottage cheese    · Meats and proteins:      ? Chicken or turkey with no skin    ? Baked or broiled fish    ? Lean beef and pork (loin, round, extra lean hamburger)    ? Beans and peas, unsalted nuts, soy products    ? Egg whites and substitutes    ?  Seeds and nuts    · Fats:      ? Unsaturated oil, such as canola, olive, peanut, soybean, or sunflower oil    ? Soft or liquid margarine and vegetable oil spread    ? Low-fat salad dressing    Other ways to decrease fat:   · Read food labels before you buy foods  Choose foods that have less than 30% of calories from fat  Choose low-fat or fat-free dairy products  Remember that fat free does not mean calorie free  These foods still contain calories, and too many calories can lead to weight gain  · Trim fat from meat and avoid fried food  Trim all visible fat from meat before you cook it  Remove the skin from poultry  Do not brian meat, fish, or poultry  Bake, roast, boil, or broil these foods instead  Avoid fried foods  Eat a baked potato instead of [place] fries  Steam vegetables instead of sautéing them in butter  · Add less fat to foods  Use imitation wolf bits on salads and baked potatoes instead of regular wolf bits  Use fat-free or low-fat salad dressings instead of regular dressings  Use low-fat or nonfat butter-flavored topping instead of regular butter or margarine on popcorn and other foods  Ways to decrease fat in recipes:  Replace high-fat ingredients with low-fat or nonfat ones  This may cause baked goods to be drier than usual  You may need to use nonfat cooking spray on pans to prevent food from sticking  You also may need to change the amount of other ingredients, such as water, in the recipe  Try the following:  · Use low-fat or light margarine instead of regular margarine or shortening  · Use lean ground turkey breast or chicken, or lean ground beef (less than 5% fat) instead of hamburger  · Add 1 teaspoon of canola oil to 8 ounces of skim milk instead of using cream or half and half  · Use grated zucchini, carrots, or apples in breads instead of coconut  · Use blenderized, low-fat cottage cheese, plain tofu, or low-fat ricotta cheese instead of cream cheese       · Use 1 egg white and 1 teaspoon of canola oil, or use ¼ cup (2 ounces) of fat-free egg substitute instead of a whole egg  · Replace half of the oil that is called for in a recipe with applesauce when you bake  Use 3 tablespoons of cocoa powder and 1 tablespoon of canola oil instead of a square of baking chocolate  How to increase fiber:  Eat enough high-fiber foods to get 20 to 30 grams of fiber every day  Slowly increase your fiber intake to avoid stomach cramps, gas, and other problems  · Eat 3 ounces of whole-grain foods each day  An ounce is about 1 slice of bread  Eat whole-grain breads, such as whole-wheat bread  Whole wheat, whole-wheat flour, or other whole grains should be listed as the first ingredient on the food label  Replace white flour with whole-grain flour or use half of each in recipes  Whole-grain flour is heavier than white flour, so you may have to add more yeast or baking powder  · Eat a high-fiber cereal for breakfast   Oatmeal is a good source of soluble fiber  Look for cereals that have bran or fiber in the name  Choose whole-grain products, such as brown rice, barley, and whole-wheat pasta  · Eat more beans, peas, and lentils  For example, add beans to soups or salads  Eat at least 5 cups of fruits and vegetables each day  Eat fruits and vegetables with the peel because the peel is high in fiber  © Copyright [employer] 2022 Information is for End User's use only and may not be sold, redistributed or otherwise used for commercial purposes  All illustrations and images included in CareNotes® are the copyrighted property of [employer] M [employer] Inc  or [address] [place], [place] [address]emily johana   The above information is an  only  It is not intended as medical advice for individual conditions or treatments  Talk to your doctor, nurse or pharmacist before following any medical regimen to see if it is safe and effective for you      Heart Healthy Diet   AMBULATORY CARE:   A heart healthy diet  is an eating plan low in unhealthy fats and sodium (salt)  The plan is high in healthy fats and fiber  A heart healthy diet helps improve your cholesterol levels and lowers your risk for heart disease and stroke  A dietitian will teach you how to read and understand food labels  Heart healthy diet guidelines to follow:   · Choose foods that contain healthy fats  ? Unsaturated fats  include monounsaturated and polyunsaturated fats  Unsaturated fat is found in foods such as soybean, canola, olive, corn, and safflower oils  It is also found in soft tub margarine that is made with liquid vegetable oil  ? Omega-3 fat  is found in certain fish, such as salmon, tuna, and trout, and in walnuts and flaxseed  Eat fish high in omega-3 fats at least 2 times a week  · Get 20 to 30 grams of fiber each day  Fruits, vegetables, whole-grain foods, and legumes (cooked beans) are good sources of fiber  · Limit or do not have unhealthy fats  ? Cholesterol  is found in animal foods, such as eggs and lobster, and in dairy products made from whole milk  Limit cholesterol to less than 200 mg each day  ? Saturated fat  is found in meats, such as wolf and hamburger  It is also found in chicken or turkey skin, whole milk, and butter  Limit saturated fat to less than 7% of your total daily calories  ? Trans fat  is found in packaged foods, such as potato chips and cookies  It is also in hard margarine, some fried foods, and shortening  Do not eat foods that contain trans fats  · Limit sodium as directed  You may be told to limit sodium to 2,000 to 2,300 mg each day  Choose low-sodium or no-salt-added foods  Add little or no salt to food you prepare  Use herbs and spices in place of salt  Include the following in your heart healthy plan:  Ask your dietitian or healthcare provider how many servings to have from each of the following food groups:  · Grains:      ?  Whole-wheat breads, cereals, and pastas, and [place] rice    ? Low-fat, low-sodium crackers and chips    · Vegetables:      ? Broccoli, green beans, green peas, and spinach    ? Collards, kale, and lima beans    ? Carrots, sweet potatoes, tomatoes, and peppers    ? Canned vegetables with no salt added    · Fruits:      ? Bananas, peaches, pears, and pineapple    ? Grapes, raisins, and dates    ? Oranges, tangerines, grapefruit, orange juice, and grapefruit juice    ? Apricots, mangoes, melons, and papaya    ? Raspberries and strawberries    ? Canned fruit with no added sugar    · Low-fat dairy:      ? Nonfat (skim) milk, 1% milk, and low-fat almond, cashew, or soy milks fortified with calcium    ? Low-fat cheese, regular or frozen yogurt, and cottage cheese    · Meats and proteins:      ? Lean cuts of beef and pork (loin, leg, round), skinless chicken and turkey    ? Legumes, soy products, egg whites, or nuts    Limit or do not include the following in your heart healthy plan:   · Unhealthy fats and oils:      ? Whole or 2% milk, cream cheese, sour cream, or cheese    ? High-fat cuts of beef (T-bone steaks, ribs), chicken or turkey with skin, and organ meats such as liver    ? Butter, stick margarine, shortening, and cooking oils such as coconut or palm oil    · Foods and liquids high in sodium:      ? Packaged foods, such as frozen dinners, cookies, macaroni and cheese, and cereals with more than 300 mg of sodium per serving    ? Vegetables with added sodium, such as instant potatoes, vegetables with added sauces, or regular canned vegetables    ? Cured or smoked meats, such as hot dogs, wolf, and sausage    ? High-sodium ketchup, barbecue sauce, salad dressing, pickles, olives, soy sauce, or miso    · Foods and liquids high in sugar:      ? Candy, cake, cookies, pies, or doughnuts    ? Soft drinks (soda), sports drinks, or sweetened tea    ? Canned or dry mixes for cakes, soups, sauces, or gravies    Other healthy heart guidelines:   · Do not smoke    Nicotine and other chemicals in cigarettes and cigars can cause lung and heart damage  Ask your healthcare provider for information if you currently smoke and need help to quit  E-cigarettes or smokeless tobacco still contain nicotine  Talk to your healthcare provider before you use these products  · Limit or do not drink alcohol as directed  Alcohol can damage your heart and raise your blood pressure  Your healthcare provider may give you specific daily and weekly limits  The general recommended limit is 1 drink a day for women 21 or older and for men 72 or older  Do not have more than 3 drinks in a day or 7 in a week  The recommended limit is 2 drinks a day for men 24to 59years of age  Do not have more than 4 drinks in a day or 14 in a week  A drink of alcohol is 12 ounces of beer, 5 ounces of wine, or 1½ ounces of liquor  · Exercise regularly  Exercise can help you maintain a healthy weight and improve your blood pressure and cholesterol levels  Regular exercise can also decrease your risk for heart problems  Ask your healthcare provider about the best exercise plan for you  Do not start an exercise program without asking your healthcare provider  Follow up with your doctor or cardiologist as directed:  Write down your questions so you remember to ask them during your visits  © Copyright [employer] 2022 Information is for End User's use only and may not be sold, redistributed or otherwise used for commercial purposes  All illustrations and images included in CareNotes® are the copyrighted property of [employer]  or Jerrica Fenton   The above information is an  only  It is not intended as medical advice for individual conditions or treatments  Talk to your doctor, nurse or pharmacist before following any medical regimen to see if it is safe and effective for you      Medicare Preventive Visit Patient Instructions  Thank you for completing your Welcome to Medicare Visit or Medicare Annual Wellness Visit today  Your next wellness visit will be due in one year (5/28/2023)  The screening/preventive services that you may require over the next 5-10 years are detailed below  Some tests may not apply to you based off risk factors and/or age  Screening tests ordered at today's visit but not completed yet may show as past due  Also, please note that scanned in results may not display below  Preventive Screenings:  Service Recommendations Previous Testing/Comments   Colorectal Cancer Screening  * Colonoscopy    * Fecal Occult Blood Test (FOBT)/Fecal Immunochemical Test (FIT)  * Fecal DNA/Cologuard Test  * Flexible Sigmoidoscopy Age: 54-65 years old   Colonoscopy: every 10 years (may be performed more frequently if at higher risk)  OR  FOBT/FIT: every 1 year  OR  Cologuard: every 3 years  OR  Sigmoidoscopy: every 5 years  Screening may be recommended earlier than age 48 if at higher risk for colorectal cancer  Also, an individualized decision between you and your healthcare provider will decide whether screening between the ages of 74-80 would be appropriate  Colonoscopy: 07/11/2002  FOBT/FIT: Not on file  Cologuard: Not on file  Sigmoidoscopy: Not on file          Breast Cancer Screening Age: 36 years old  Frequency: every 1-2 years  Not required if history of left and right mastectomy Mammogram: 10/06/2020    Screening Current   Cervical Cancer Screening Between the ages of 21-29, pap smear recommended once every 3 years  Between the ages of 33-67, can perform pap smear with HPV co-testing every 5 years     Recommendations may differ for women with a history of total hysterectomy, cervical cancer, or abnormal pap smears in past  Pap Smear: 02/17/2021    Screening Not Indicated   Hepatitis C Screening Once for adults born between 1945 and 1965  More frequently in patients at high risk for Hepatitis C Hep C Antibody: 04/25/2018    Screening Current   Diabetes Screening 1-2 times per year if you're at risk for diabetes or have pre-diabetes Fasting glucose: 95 mg/dL   A1C: 6 1 %    Screening Current   Cholesterol Screening Once every 5 years if you don't have a lipid disorder  May order more often based on risk factors  Lipid panel: 05/17/2021    Screening Not Indicated  History Lipid Disorder     Other Preventive Screenings Covered by Medicare:  1  Abdominal Aortic Aneurysm (AAA) Screening: covered once if your at risk  You're considered to be at risk if you have a family history of AAA  2  Lung Cancer Screening: covers low dose CT scan once per year if you meet all of the following conditions: (1) Age 50-69; (2) No signs or symptoms of lung cancer; (3) Current smoker or have quit smoking within the last 15 years; (4) You have a tobacco smoking history of at least 30 pack years (packs per day multiplied by number of years you smoked); (5) You get a written order from a healthcare provider  3  Glaucoma Screening: covered annually if you're considered high risk: (1) You have diabetes OR (2) Family history of glaucoma OR (3)  aged 48 and older OR (3)  American aged 72 and older  3  Osteoporosis Screening: covered every 2 years if you meet one of the following conditions: (1) You're estrogen deficient and at risk for osteoporosis based off medical history and other findings; (2) Have a vertebral abnormality; (3) On glucocorticoid therapy for more than 3 months; (4) Have primary hyperparathyroidism; (5) On osteoporosis medications and need to assess response to drug therapy  · Last bone density test (DXA Scan): Not on file  5  HIV Screening: covered annually if you're between the age of 12-76  Also covered annually if you are younger than 13 and older than 72 with risk factors for HIV infection  For pregnant patients, it is covered up to 3 times per pregnancy      Immunizations:  Immunization Recommendations   Influenza Vaccine Annual influenza vaccination during flu season is recommended for all persons aged >= 6 months who do not have contraindications   Pneumococcal Vaccine (Prevnar and Pneumovax)  * Prevnar = PCV13  * Pneumovax = PPSV23   Adults 25-60 years old: 1-3 doses may be recommended based on certain risk factors  Adults 72 years old: Prevnar (PCV13) vaccine recommended followed by Pneumovax (PPSV23) vaccine  If already received PPSV23 since turning 65, then PCV13 recommended at least one year after PPSV23 dose  Hepatitis B Vaccine 3 dose series if at intermediate or high risk (ex: diabetes, end stage renal disease, liver disease)   Tetanus (Td) Vaccine - COST NOT COVERED BY MEDICARE PART B Following completion of primary series, a booster dose should be given every 10 years to maintain immunity against tetanus  Td may also be given as tetanus wound prophylaxis  Tdap Vaccine - COST NOT COVERED BY MEDICARE PART B Recommended at least once for all adults  For pregnant patients, recommended with each pregnancy  Shingles Vaccine (Shingrix) - COST NOT COVERED BY MEDICARE PART B  2 shot series recommended in those aged 48 and above     Health Maintenance Due:      Topic Date Due    Breast Cancer Screening: Mammogram  10/06/2021    Hepatitis C Screening  Completed     Immunizations Due:      Topic Date Due    COVID-19 Vaccine (1) Never done    DTaP,Tdap,and Td Vaccines (1 - Tdap) Never done    Pneumococcal Vaccine: 65+ Years (1 - PCV) Never done     Advance Directives   What are advance directives? Advance directives are legal documents that state your wishes and plans for medical care  These plans are made ahead of time in case you lose your ability to make decisions for yourself  Advance directives can apply to any medical decision, such as the treatments you want, and if you want to donate organs  What are the types of advance directives? There are many types of advance directives, and each state has rules about how to use them   You may choose a combination of any of the following:  · Living will: This is a written record of the treatment you want  You can also choose which treatments you do not want, which to limit, and which to stop at a certain time  This includes surgery, medicine, IV fluid, and tube feedings  · Durable power of  for healthcare [place] [facility]): This is a written record that states who you want to make healthcare choices for you when you are unable to make them for yourself  This person, called a proxy, is usually a family member or a friend  You may choose more than 1 proxy  · Do not resuscitate (DNR) order:  A DNR order is used in case your heart stops beating or you stop breathing  It is a request not to have certain forms of treatment, such as CPR  A DNR order may be included in other types of advance directives  · Medical directive: This covers the care that you want if you are in a coma, near death, or unable to make decisions for yourself  You can list the treatments you want for each condition  Treatment may include pain medicine, surgery, blood transfusions, dialysis, IV or tube feedings, and a ventilator (breathing machine)  · Values history: This document has questions about your views, beliefs, and how you feel and think about life  This information can help others choose the care that you would choose  Why are advance directives important? An advance directive helps you control your care  Although spoken wishes may be used, it is better to have your wishes written down  Spoken wishes can be misunderstood, or not followed  Treatments may be given even if you do not want them  An advance directive may make it easier for your family to make difficult choices about your care  Urinary Incontinence   Urinary incontinence (UI)  is when you lose control of your bladder  UI develops because your bladder cannot store or empty urine properly  The 3 most common types of UI are stress incontinence, urge incontinence, or both    Medicines:   · May be given to help strengthen your bladder control  Report any side effects of medication to your healthcare provider  Do pelvic muscle exercises often:  Your pelvic muscles help you stop urinating  Squeeze these muscles tight for 5 seconds, then relax for 5 seconds  Gradually work up to squeezing for 10 seconds  Do 3 sets of 15 repetitions a day, or as directed  This will help strengthen your pelvic muscles and improve bladder control  Train your bladder:  Go to the bathroom at set times, such as every 2 hours, even if you do not feel the urge to go  You can also try to hold your urine when you feel the urge to go  For example, hold your urine for 5 minutes when you feel the urge to go  As that becomes easier, hold your urine for 10 minutes  Self-care:   · Keep a UI record  Write down how often you leak urine and how much you leak  Make a note of what you were doing when you leaked urine  · Drink liquids as directed  You may need to limit the amount of liquid you drink to help control your urine leakage  Do not drink any liquid right before you go to bed  Limit or do not have drinks that contain caffeine or alcohol  · Prevent constipation  Eat a variety of high-fiber foods  Good examples are high-fiber cereals, beans, vegetables, and whole-grain breads  Walking is the best way to trigger your intestines to have a bowel movement  · Exercise regularly and maintain a healthy weight  Weight loss and exercise will decrease pressure on your bladder and help you control your leakage  · Use a catheter as directed  to help empty your bladder  A catheter is a tiny, plastic tube that is put into your bladder to drain your urine  · Go to behavior therapy as directed  Behavior therapy may be used to help you learn to control your urge to urinate      Weight Management   Why it is important to manage your weight:  Being overweight increases your risk of health conditions such as heart disease, high blood pressure, type 2 diabetes, and certain types of cancer  It can also increase your risk for osteoarthritis, sleep apnea, and other respiratory problems  Aim for a slow, steady weight loss  Even a small amount of weight loss can lower your risk of health problems  How to lose weight safely:  A safe and healthy way to lose weight is to eat fewer calories and get regular exercise  You can lose up about 1 pound a week by decreasing the number of calories you eat by 500 calories each day  Healthy meal plan for weight management:  A healthy meal plan includes a variety of foods, contains fewer calories, and helps you stay healthy  A healthy meal plan includes the following:  · Eat whole-grain foods more often  A healthy meal plan should contain fiber  Fiber is the part of grains, fruits, and vegetables that is not broken down by your body  Whole-grain foods are healthy and provide extra fiber in your diet  Some examples of whole-grain foods are whole-wheat breads and pastas, oatmeal, brown rice, and bulgur  · Eat a variety of vegetables every day  Include dark, leafy greens such as spinach, kale, arsen greens, and mustard greens  Eat yellow and orange vegetables such as carrots, sweet potatoes, and winter squash  · Eat a variety of fruits every day  Choose fresh or canned fruit (canned in its own juice or light syrup) instead of juice  Fruit juice has very little or no fiber  · Eat low-fat dairy foods  Drink fat-free (skim) milk or 1% milk  Eat fat-free yogurt and low-fat cottage cheese  Try low-fat cheeses such as mozzarella and other reduced-fat cheeses  · Choose meat and other protein foods that are low in fat  Choose beans or other legumes such as split peas or lentils  Choose fish, skinless poultry (chicken or turkey), or lean cuts of red meat (beef or pork)  Before you cook meat or poultry, cut off any visible fat  · Use less fat and oil  Try baking foods instead of frying them   Add less fat, such as margarine, sour cream, regular salad dressing and mayonnaise to foods  Eat fewer high-fat foods  Some examples of high-fat foods include french fries, doughnuts, ice cream, and cakes  · Eat fewer sweets  Limit foods and drinks that are high in sugar  This includes candy, cookies, regular soda, and sweetened drinks  Exercise:  Exercise at least 30 minutes per day on most days of the week  Some examples of exercise include walking, biking, dancing, and swimming  You can also fit in more physical activity by taking the stairs instead of the elevator or parking farther away from stores  Ask your healthcare provider about the best exercise plan for you  © Copyright [employer] 2018 Information is for End User's use only and may not be sold, redistributed or otherwise used for commercial purposes  All illustrations and images included in CareNotes® are the copyrighted property of [employer]  or [facility] Preventive Visit Patient Instructions  Thank you for completing your Welcome to Medicare Visit or Medicare Annual Wellness Visit today  Your next wellness visit will be due in one year (5/28/2023)  The screening/preventive services that you may require over the next 5-10 years are detailed below  Some tests may not apply to you based off risk factors and/or age  Screening tests ordered at today's visit but not completed yet may show as past due  Also, please note that scanned in results may not display below    Preventive Screenings:  Service Recommendations Previous Testing/Comments   Colorectal Cancer Screening  * Colonoscopy    * Fecal Occult Blood Test (FOBT)/Fecal Immunochemical Test (FIT)  * Fecal DNA/Cologuard Test  * Flexible Sigmoidoscopy Age: 54-65 years old   Colonoscopy: every 10 years (may be performed more frequently if at higher risk)  OR  FOBT/FIT: every 1 year  OR  Cologuard: every 3 years  OR  Sigmoidoscopy: every 5 years  Screening may be recommended earlier than age 48 if at higher risk for colorectal cancer  Also, an individualized decision between you and your healthcare provider will decide whether screening between the ages of 74-80 would be appropriate  Colonoscopy: 07/11/2002  FOBT/FIT: Not on file  Cologuard: Not on file  Sigmoidoscopy: Not on file          Breast Cancer Screening Age: 36 years old  Frequency: every 1-2 years  Not required if history of left and right mastectomy Mammogram: 10/06/2020    Screening Current   Cervical Cancer Screening Between the ages of 21-29, pap smear recommended once every 3 years  Between the ages of 33-67, can perform pap smear with HPV co-testing every 5 years  Recommendations may differ for women with a history of total hysterectomy, cervical cancer, or abnormal pap smears in past  Pap Smear: 02/17/2021    Screening Not Indicated   Hepatitis C Screening Once for adults born between 1945 and 1965  More frequently in patients at high risk for Hepatitis C Hep C Antibody: 04/25/2018    Screening Current   Diabetes Screening 1-2 times per year if you're at risk for diabetes or have pre-diabetes Fasting glucose: 95 mg/dL   A1C: 6 1 %    Screening Current   Cholesterol Screening Once every 5 years if you don't have a lipid disorder  May order more often based on risk factors  Lipid panel: 05/17/2021    Screening Not Indicated  History Lipid Disorder     Other Preventive Screenings Covered by Medicare:  6  Abdominal Aortic Aneurysm (AAA) Screening: covered once if your at risk  You're considered to be at risk if you have a family history of AAA    7  Lung Cancer Screening: covers low dose CT scan once per year if you meet all of the following conditions: (1) Age 50-69; (2) No signs or symptoms of lung cancer; (3) Current smoker or have quit smoking within the last 15 years; (4) You have a tobacco smoking history of at least 30 pack years (packs per day multiplied by number of years you smoked); (5) You get a written order from a healthcare provider  8  Glaucoma Screening: covered annually if you're considered high risk: (1) You have diabetes OR (2) Family history of glaucoma OR (3)  aged 48 and older OR (3)  American aged 72 and older  5  Osteoporosis Screening: covered every 2 years if you meet one of the following conditions: (1) You're estrogen deficient and at risk for osteoporosis based off medical history and other findings; (2) Have a vertebral abnormality; (3) On glucocorticoid therapy for more than 3 months; (4) Have primary hyperparathyroidism; (5) On osteoporosis medications and need to assess response to drug therapy  · Last bone density test (DXA Scan): Not on file  10  HIV Screening: covered annually if you're between the age of 12-76  Also covered annually if you are younger than 13 and older than 72 with risk factors for HIV infection  For pregnant patients, it is covered up to 3 times per pregnancy  Immunizations:  Immunization Recommendations   Influenza Vaccine Annual influenza vaccination during flu season is recommended for all persons aged >= 6 months who do not have contraindications   Pneumococcal Vaccine (Prevnar and Pneumovax)  * Prevnar = PCV13  * Pneumovax = PPSV23   Adults 25-60 years old: 1-3 doses may be recommended based on certain risk factors  Adults 72 years old: Prevnar (PCV13) vaccine recommended followed by Pneumovax (PPSV23) vaccine  If already received PPSV23 since turning 65, then PCV13 recommended at least one year after PPSV23 dose  Hepatitis B Vaccine 3 dose series if at intermediate or high risk (ex: diabetes, end stage renal disease, liver disease)   Tetanus (Td) Vaccine - COST NOT COVERED BY MEDICARE PART B Following completion of primary series, a booster dose should be given every 10 years to maintain immunity against tetanus  Td may also be given as tetanus wound prophylaxis  Tdap Vaccine - COST NOT COVERED BY MEDICARE PART B Recommended at least once for all adults  For pregnant patients, recommended with each pregnancy  Shingles Vaccine (Shingrix) - COST NOT COVERED BY MEDICARE PART B  2 shot series recommended in those aged 48 and above     Health Maintenance Due:      Topic Date Due    Breast Cancer Screening: Mammogram  10/06/2021    Hepatitis C Screening  Completed     Immunizations Due:      Topic Date Due    COVID-19 Vaccine (1) Never done    DTaP,Tdap,and Td Vaccines (1 - Tdap) Never done    Pneumococcal Vaccine: 65+ Years (1 - PCV) Never done     Advance Directives   What are advance directives? Advance directives are legal documents that state your wishes and plans for medical care  These plans are made ahead of time in case you lose your ability to make decisions for yourself  Advance directives can apply to any medical decision, such as the treatments you want, and if you want to donate organs  What are the types of advance directives? There are many types of advance directives, and each state has rules about how to use them  You may choose a combination of any of the following:  · Living will: This is a written record of the treatment you want  You can also choose which treatments you do not want, which to limit, and which to stop at a certain time  This includes surgery, medicine, IV fluid, and tube feedings  · Durable power of  for healthcare [place] [facility]): This is a written record that states who you want to make healthcare choices for you when you are unable to make them for yourself  This person, called a proxy, is usually a family member or a friend  You may choose more than 1 proxy  · Do not resuscitate (DNR) order:  A DNR order is used in case your heart stops beating or you stop breathing  It is a request not to have certain forms of treatment, such as CPR  A DNR order may be included in other types of advance directives  · Medical directive:   This covers the care that you want if you are in a coma, near death, or unable to make decisions for yourself  You can list the treatments you want for each condition  Treatment may include pain medicine, surgery, blood transfusions, dialysis, IV or tube feedings, and a ventilator (breathing machine)  · Values history: This document has questions about your views, beliefs, and how you feel and think about life  This information can help others choose the care that you would choose  Why are advance directives important? An advance directive helps you control your care  Although spoken wishes may be used, it is better to have your wishes written down  Spoken wishes can be misunderstood, or not followed  Treatments may be given even if you do not want them  An advance directive may make it easier for your family to make difficult choices about your care  Urinary Incontinence   Urinary incontinence (UI)  is when you lose control of your bladder  UI develops because your bladder cannot store or empty urine properly  The 3 most common types of UI are stress incontinence, urge incontinence, or both  Medicines:   · May be given to help strengthen your bladder control  Report any side effects of medication to your healthcare provider  Do pelvic muscle exercises often:  Your pelvic muscles help you stop urinating  Squeeze these muscles tight for 5 seconds, then relax for 5 seconds  Gradually work up to squeezing for 10 seconds  Do 3 sets of 15 repetitions a day, or as directed  This will help strengthen your pelvic muscles and improve bladder control  Train your bladder:  Go to the bathroom at set times, such as every 2 hours, even if you do not feel the urge to go  You can also try to hold your urine when you feel the urge to go  For example, hold your urine for 5 minutes when you feel the urge to go  As that becomes easier, hold your urine for 10 minutes  Self-care:   · Keep a UI record  Write down how often you leak urine and how much you leak   Make a note of what you were doing when you leaked urine   · Drink liquids as directed  You may need to limit the amount of liquid you drink to help control your urine leakage  Do not drink any liquid right before you go to bed  Limit or do not have drinks that contain caffeine or alcohol  · Prevent constipation  Eat a variety of high-fiber foods  Good examples are high-fiber cereals, beans, vegetables, and whole-grain breads  Walking is the best way to trigger your intestines to have a bowel movement  · Exercise regularly and maintain a healthy weight  Weight loss and exercise will decrease pressure on your bladder and help you control your leakage  · Use a catheter as directed  to help empty your bladder  A catheter is a tiny, plastic tube that is put into your bladder to drain your urine  · Go to behavior therapy as directed  Behavior therapy may be used to help you learn to control your urge to urinate  Weight Management   Why it is important to manage your weight:  Being overweight increases your risk of health conditions such as heart disease, high blood pressure, type 2 diabetes, and certain types of cancer  It can also increase your risk for osteoarthritis, sleep apnea, and other respiratory problems  Aim for a slow, steady weight loss  Even a small amount of weight loss can lower your risk of health problems  How to lose weight safely:  A safe and healthy way to lose weight is to eat fewer calories and get regular exercise  You can lose up about 1 pound a week by decreasing the number of calories you eat by 500 calories each day  Healthy meal plan for weight management:  A healthy meal plan includes a variety of foods, contains fewer calories, and helps you stay healthy  A healthy meal plan includes the following:  · Eat whole-grain foods more often  A healthy meal plan should contain fiber  Fiber is the part of grains, fruits, and vegetables that is not broken down by your body   Whole-grain foods are healthy and provide extra fiber in your diet  Some examples of whole-grain foods are whole-wheat breads and pastas, oatmeal, brown rice, and bulgur  · Eat a variety of vegetables every day  Include dark, leafy greens such as spinach, kale, arsen greens, and mustard greens  Eat yellow and orange vegetables such as carrots, sweet potatoes, and winter squash  · Eat a variety of fruits every day  Choose fresh or canned fruit (canned in its own juice or light syrup) instead of juice  Fruit juice has very little or no fiber  · Eat low-fat dairy foods  Drink fat-free (skim) milk or 1% milk  Eat fat-free yogurt and low-fat cottage cheese  Try low-fat cheeses such as mozzarella and other reduced-fat cheeses  · Choose meat and other protein foods that are low in fat  Choose beans or other legumes such as split peas or lentils  Choose fish, skinless poultry (chicken or turkey), or lean cuts of red meat (beef or pork)  Before you cook meat or poultry, cut off any visible fat  · Use less fat and oil  Try baking foods instead of frying them  Add less fat, such as margarine, sour cream, regular salad dressing and mayonnaise to foods  Eat fewer high-fat foods  Some examples of high-fat foods include french fries, doughnuts, ice cream, and cakes  · Eat fewer sweets  Limit foods and drinks that are high in sugar  This includes candy, cookies, regular soda, and sweetened drinks  Exercise:  Exercise at least 30 minutes per day on most days of the week  Some examples of exercise include walking, biking, dancing, and swimming  You can also fit in more physical activity by taking the stairs instead of the elevator or parking farther away from stores  Ask your healthcare provider about the best exercise plan for you  © Copyright [employer] 2018 Information is for End User's use only and may not be sold, redistributed or otherwise used for commercial purposes   All illustrations and images included in CareNotes® are the copyrighted property of [employer]  or [facility]

## 2022-05-27 NOTE — PROGRESS NOTES
BMI Counseling: There is no height or weight on file to calculate BMI  The BMI is above normal  Nutrition recommendations include decreasing portion sizes and encouraging healthy choices of fruits and vegetables  Exercise recommendations include moderate physical activity 150 minutes/week  No pharmacotherapy was ordered  Rationale for BMI follow-up plan is due to patient being overweight or obese  Assessment/Plan:  Problem List Items Addressed This Visit        Digestive    GERD without esophagitis    Relevant Orders    CBC and differential       Cardiovascular and Mediastinum    Venous insufficiency of both lower extremities    Relevant Orders    CBC and differential       Other    Vitamin D deficiency    Hyperlipidemia - Primary    Relevant Orders    Comprehensive metabolic panel    Lipid Panel with Direct LDL reflex    TSH, 3rd generation with Free T4 reflex    Chronic lower back pain      Other Visit Diagnoses     Screening for diabetes mellitus (DM)        Relevant Orders    Hemoglobin A1C    Screening for thyroid disorder        Relevant Orders    TSH, 3rd generation with Free T4 reflex    Encounter for screening mammogram for malignant neoplasm of breast        Relevant Orders    Mammo screening bilateral w 3d & cad    Medicare annual wellness visit, subsequent        Encounter for vaccination        Menopause ovarian failure        Relevant Orders    DXA bone density spine hip and pelvis           Diagnoses and all orders for this visit:    Mixed hyperlipidemia  -     Comprehensive metabolic panel; Future  -     Lipid Panel with Direct LDL reflex; Future  -     TSH, 3rd generation with Free T4 reflex;  Future    GERD without esophagitis  -     CBC and differential; Future    Venous insufficiency of both lower extremities  -     CBC and differential; Future    Vitamin D deficiency    Chronic low back pain, unspecified back pain laterality, unspecified whether sciatica present    Screening for diabetes mellitus (DM)  -     Hemoglobin A1C; Future    Screening for thyroid disorder  -     TSH, 3rd generation with Free T4 reflex; Future    Encounter for screening mammogram for malignant neoplasm of breast  -     Mammo screening bilateral w 3d & cad; Future    Medicare annual wellness visit, subsequent    Encounter for vaccination    Menopause ovarian failure  -     DXA bone density spine hip and pelvis; Future    Other orders  -     Multiple Minerals-Vitamins (CALCIUM-MAGNESIUM-ZINC-D3 PO); Take 1 tablet by mouth in the morning      No problem-specific Assessment & Plan notes found for this encounter  A/P: Doing well and will check labs  Discussed BMI and will give info on diet and exercise  Discussed vaccines and defers  Order mammo and dexa  Continue current treatment and RTC six months for routine  Subjective:      Patient ID: Ava Stevenson is a 68 y o  female  HF RTC for f/u hyperlipidemia, GERD, etc  Doing well and no new issues  Remains active w/o difficulty and no falls  Chronic pain is manageable  No reflux  Due for labs, mammo, dexa, and vaccines  The following portions of the patient's history were reviewed and updated as appropriate:   She has a past medical history of Abnormal electrocardiogram, Chronic kidney disease, GERD (gastroesophageal reflux disease), Kidney stone, and Primary osteoarthritis involving multiple joints (11/7/2019)  ,  does not have any pertinent problems on file  ,   has a past surgical history that includes Tubal ligation; Tonsillectomy; Tooth extraction; and Eye surgery  ,  family history includes Aneurysm in her father; Cirrhosis in her brother; Hypertension in her sister; Nephrolithiasis in her father; No Known Problems in her daughter, daughter, daughter, maternal aunt, maternal grandmother, paternal grandmother, and sister; Other in her daughter and mother  ,   reports that she has quit smoking   She has never used smokeless tobacco  She reports current alcohol use  She reports that she does not use drugs  ,  is allergic to influenza virus vaccine, furosemide, and doxycycline     Current Outpatient Medications   Medication Sig Dispense Refill    cholecalciferol (VITAMIN D) 400 units/mL Take 400 Units by mouth daily      Multiple Minerals-Vitamins (CALCIUM-MAGNESIUM-ZINC-D3 PO) Take 1 tablet by mouth in the morning      metroNIDAZOLE (METROGEL) 0 75 % vaginal gel Insert 1 application into the vagina daily (Patient not taking: No sig reported) 70 g 0    nystatin-triamcinolone (MYCOLOG-II) ointment Apply topically 2 (two) times a day (Patient not taking: No sig reported) 30 g 0    nystatin-triamcinolone (MYCOLOG-II) ointment Apply topically 2 (two) times a day (Patient not taking: No sig reported) 30 g 3    olopatadine (PATANOL) 0 1 % ophthalmic solution Administer 1 drop to both eyes 2 (two) times a day (Patient not taking: No sig reported) 5 mL 0    Omega-3 Fatty Acids (FISH OIL) 1,000 mg Fish Oil (Patient not taking: No sig reported)      pantoprazole (PROTONIX) 20 mg tablet Take 20 mg by mouth daily (Patient not taking: No sig reported)      pyridoxine (VITAMIN B6) 100 mg tablet Vitamin B-6 (Patient not taking: No sig reported)      triamcinolone (KENALOG) 0 1 % cream triamcinolone acetonide 0 1 % topical cream (Patient not taking: No sig reported)       No current facility-administered medications for this visit  Review of Systems   Constitutional: Negative for activity change, chills, diaphoresis, fatigue and fever  HENT: Negative  Eyes: Negative for visual disturbance  Respiratory: Negative for cough, chest tightness, shortness of breath and wheezing  Cardiovascular: Negative for chest pain, palpitations and leg swelling  Gastrointestinal: Negative for abdominal pain, constipation, diarrhea, nausea and vomiting  Endocrine: Negative for cold intolerance and heat intolerance     Genitourinary: Negative for difficulty urinating, dysuria and frequency  Musculoskeletal: Negative for arthralgias, gait problem and myalgias  Neurological: Negative for dizziness, seizures, syncope, weakness, light-headedness and headaches  Psychiatric/Behavioral: Negative for confusion, dysphoric mood and sleep disturbance  The patient is not nervous/anxious  PHQ-2/9 Depression Screening    Little interest or pleasure in doing things: 0 - not at all  Feeling down, depressed, or hopeless: 0 - not at all  PHQ-2 Score: 0  PHQ-2 Interpretation: Negative depression screen        Objective:  Vitals:    05/27/22 1442   BP: 130/70   Pulse: 74   Temp: 98 4 °F (36 9 °C)   TempSrc: Tympanic   SpO2: 99%   Weight: 76 9 kg (169 lb 9 6 oz)   Height: 4' 11" (1 499 m)     Body mass index is 34 26 kg/m²  Physical Exam  Vitals and nursing note reviewed  Constitutional:       General: She is not in acute distress  Appearance: Normal appearance  She is not ill-appearing  HENT:      Head: Normocephalic and atraumatic  Mouth/Throat:      Mouth: Mucous membranes are moist    Eyes:      Extraocular Movements: Extraocular movements intact  Conjunctiva/sclera: Conjunctivae normal       Pupils: Pupils are equal, round, and reactive to light  Neck:      Vascular: No carotid bruit  Cardiovascular:      Rate and Rhythm: Normal rate and regular rhythm  Heart sounds: Normal heart sounds  Pulmonary:      Effort: Pulmonary effort is normal  No respiratory distress  Breath sounds: Normal breath sounds  No wheezing or rales  Abdominal:      General: Bowel sounds are normal  There is no distension  Palpations: Abdomen is soft  Tenderness: There is no abdominal tenderness  Musculoskeletal:      Cervical back: Neck supple  Right lower leg: No edema  Left lower leg: No edema  Neurological:      General: No focal deficit present  Mental Status: She is alert and oriented to person, place, and time  Mental status is at baseline  Psychiatric:         Mood and Affect: Mood normal          Behavior: Behavior normal          Thought Content: Thought content normal          Judgment: Judgment normal          BMI Counseling: Body mass index is 34 26 kg/m²  The BMI is above normal  Nutrition recommendations include reducing portion sizes, decreasing overall calorie intake, reducing intake of saturated fat and trans fat and reducing intake of cholesterol  Exercise recommendations include moderate aerobic physical activity for 150 minutes/week

## 2022-05-31 ENCOUNTER — APPOINTMENT (OUTPATIENT)
Dept: LAB | Facility: CLINIC | Age: 77
End: 2022-05-31
Payer: COMMERCIAL

## 2022-05-31 DIAGNOSIS — Z13.29 SCREENING FOR THYROID DISORDER: ICD-10-CM

## 2022-05-31 DIAGNOSIS — E78.2 MIXED HYPERLIPIDEMIA: ICD-10-CM

## 2022-05-31 DIAGNOSIS — I87.2 VENOUS INSUFFICIENCY OF BOTH LOWER EXTREMITIES: ICD-10-CM

## 2022-05-31 DIAGNOSIS — Z13.1 SCREENING FOR DIABETES MELLITUS (DM): ICD-10-CM

## 2022-05-31 DIAGNOSIS — K21.9 GERD WITHOUT ESOPHAGITIS: ICD-10-CM

## 2022-05-31 LAB
ALBUMIN SERPL BCP-MCNC: 3.6 G/DL (ref 3.5–5)
ALP SERPL-CCNC: 84 U/L (ref 46–116)
ALT SERPL W P-5'-P-CCNC: 36 U/L (ref 12–78)
ANION GAP SERPL CALCULATED.3IONS-SCNC: 6 MMOL/L (ref 4–13)
AST SERPL W P-5'-P-CCNC: 38 U/L (ref 5–45)
BASOPHILS # BLD AUTO: 0.04 THOUSANDS/ΜL (ref 0–0.1)
BASOPHILS NFR BLD AUTO: 1 % (ref 0–1)
BILIRUB SERPL-MCNC: 0.41 MG/DL (ref 0.2–1)
BUN SERPL-MCNC: 17 MG/DL (ref 5–25)
CALCIUM SERPL-MCNC: 9.7 MG/DL (ref 8.3–10.1)
CHLORIDE SERPL-SCNC: 104 MMOL/L (ref 100–108)
CHOLEST SERPL-MCNC: 201 MG/DL
CO2 SERPL-SCNC: 28 MMOL/L (ref 21–32)
CREAT SERPL-MCNC: 0.7 MG/DL (ref 0.6–1.3)
EOSINOPHIL # BLD AUTO: 0.43 THOUSAND/ΜL (ref 0–0.61)
EOSINOPHIL NFR BLD AUTO: 7 % (ref 0–6)
ERYTHROCYTE [DISTWIDTH] IN BLOOD BY AUTOMATED COUNT: 14.1 % (ref 11.6–15.1)
EST. AVERAGE GLUCOSE BLD GHB EST-MCNC: 126 MG/DL
GFR SERPL CREATININE-BSD FRML MDRD: 84 ML/MIN/1.73SQ M
GLUCOSE P FAST SERPL-MCNC: 90 MG/DL (ref 65–99)
HBA1C MFR BLD: 6 %
HCT VFR BLD AUTO: 39.6 % (ref 34.8–46.1)
HDLC SERPL-MCNC: 70 MG/DL
HGB BLD-MCNC: 12.6 G/DL (ref 11.5–15.4)
IMM GRANULOCYTES # BLD AUTO: 0.01 THOUSAND/UL (ref 0–0.2)
IMM GRANULOCYTES NFR BLD AUTO: 0 % (ref 0–2)
LDLC SERPL CALC-MCNC: 120 MG/DL (ref 0–100)
LYMPHOCYTES # BLD AUTO: 1.6 THOUSANDS/ΜL (ref 0.6–4.47)
LYMPHOCYTES NFR BLD AUTO: 25 % (ref 14–44)
MCH RBC QN AUTO: 28.7 PG (ref 26.8–34.3)
MCHC RBC AUTO-ENTMCNC: 31.8 G/DL (ref 31.4–37.4)
MCV RBC AUTO: 90 FL (ref 82–98)
MONOCYTES # BLD AUTO: 0.43 THOUSAND/ΜL (ref 0.17–1.22)
MONOCYTES NFR BLD AUTO: 7 % (ref 4–12)
NEUTROPHILS # BLD AUTO: 3.85 THOUSANDS/ΜL (ref 1.85–7.62)
NEUTS SEG NFR BLD AUTO: 60 % (ref 43–75)
NRBC BLD AUTO-RTO: 0 /100 WBCS
PLATELET # BLD AUTO: 245 THOUSANDS/UL (ref 149–390)
PMV BLD AUTO: 12.3 FL (ref 8.9–12.7)
POTASSIUM SERPL-SCNC: 3.9 MMOL/L (ref 3.5–5.3)
PROT SERPL-MCNC: 7.5 G/DL (ref 6.4–8.2)
RBC # BLD AUTO: 4.39 MILLION/UL (ref 3.81–5.12)
SODIUM SERPL-SCNC: 138 MMOL/L (ref 136–145)
TRIGL SERPL-MCNC: 56 MG/DL
TSH SERPL DL<=0.05 MIU/L-ACNC: 1.83 UIU/ML (ref 0.45–4.5)
WBC # BLD AUTO: 6.36 THOUSAND/UL (ref 4.31–10.16)

## 2022-05-31 PROCEDURE — 80053 COMPREHEN METABOLIC PANEL: CPT

## 2022-05-31 PROCEDURE — 36415 COLL VENOUS BLD VENIPUNCTURE: CPT

## 2022-05-31 PROCEDURE — 84443 ASSAY THYROID STIM HORMONE: CPT

## 2022-05-31 PROCEDURE — 80061 LIPID PANEL: CPT

## 2022-05-31 PROCEDURE — 85025 COMPLETE CBC W/AUTO DIFF WBC: CPT

## 2022-05-31 PROCEDURE — 83036 HEMOGLOBIN GLYCOSYLATED A1C: CPT

## 2022-06-13 ENCOUNTER — HOSPITAL ENCOUNTER (OUTPATIENT)
Dept: MAMMOGRAPHY | Facility: HOSPITAL | Age: 77
Discharge: HOME | End: 2022-06-13
Attending: INTERNAL MEDICINE
Payer: COMMERCIAL

## 2022-06-13 ENCOUNTER — HOSPITAL ENCOUNTER (OUTPATIENT)
Dept: BONE DENSITY | Facility: HOSPITAL | Age: 77
Discharge: HOME | End: 2022-06-13
Attending: INTERNAL MEDICINE
Payer: COMMERCIAL

## 2022-06-13 DIAGNOSIS — Z12.31 ENCOUNTER FOR SCREENING MAMMOGRAM FOR MALIGNANT NEOPLASM OF BREAST: ICD-10-CM

## 2022-06-13 DIAGNOSIS — E28.39 MENOPAUSE OVARIAN FAILURE: ICD-10-CM

## 2022-06-13 PROCEDURE — 77067 SCR MAMMO BI INCL CAD: CPT

## 2022-06-13 PROCEDURE — 77080 DXA BONE DENSITY AXIAL: CPT

## 2022-06-13 PROCEDURE — 77063 BREAST TOMOSYNTHESIS BI: CPT

## 2022-06-14 ENCOUNTER — TELEPHONE (OUTPATIENT)
Dept: INTERNAL MEDICINE CLINIC | Facility: CLINIC | Age: 77
End: 2022-06-14

## 2022-06-14 DIAGNOSIS — R04.0 EPISTAXIS: Primary | ICD-10-CM

## 2022-06-14 NOTE — TELEPHONE ENCOUNTER
PT called asking if you can refer her to the ENT  She claims she is having cronic nose bleeds  Please advise

## 2022-06-23 ENCOUNTER — OFFICE VISIT (OUTPATIENT)
Dept: INTERNAL MEDICINE CLINIC | Facility: CLINIC | Age: 77
End: 2022-06-23
Payer: COMMERCIAL

## 2022-06-23 ENCOUNTER — APPOINTMENT (OUTPATIENT)
Dept: RADIOLOGY | Facility: CLINIC | Age: 77
End: 2022-06-23
Payer: COMMERCIAL

## 2022-06-23 VITALS
HEIGHT: 59 IN | BODY MASS INDEX: 33.87 KG/M2 | SYSTOLIC BLOOD PRESSURE: 120 MMHG | OXYGEN SATURATION: 97 % | TEMPERATURE: 97.6 F | DIASTOLIC BLOOD PRESSURE: 72 MMHG | WEIGHT: 168 LBS | HEART RATE: 73 BPM

## 2022-06-23 DIAGNOSIS — G89.29 CHRONIC LOW BACK PAIN, UNSPECIFIED BACK PAIN LATERALITY, UNSPECIFIED WHETHER SCIATICA PRESENT: Primary | ICD-10-CM

## 2022-06-23 DIAGNOSIS — G89.29 CHRONIC LOW BACK PAIN, UNSPECIFIED BACK PAIN LATERALITY, UNSPECIFIED WHETHER SCIATICA PRESENT: ICD-10-CM

## 2022-06-23 DIAGNOSIS — M54.50 CHRONIC LOW BACK PAIN, UNSPECIFIED BACK PAIN LATERALITY, UNSPECIFIED WHETHER SCIATICA PRESENT: ICD-10-CM

## 2022-06-23 DIAGNOSIS — M54.50 CHRONIC LOW BACK PAIN, UNSPECIFIED BACK PAIN LATERALITY, UNSPECIFIED WHETHER SCIATICA PRESENT: Primary | ICD-10-CM

## 2022-06-23 PROCEDURE — 74018 RADEX ABDOMEN 1 VIEW: CPT

## 2022-06-23 PROCEDURE — 1160F RVW MEDS BY RX/DR IN RCRD: CPT | Performed by: NURSE PRACTITIONER

## 2022-06-23 PROCEDURE — 99214 OFFICE O/P EST MOD 30 MIN: CPT | Performed by: NURSE PRACTITIONER

## 2022-06-23 PROCEDURE — 1036F TOBACCO NON-USER: CPT | Performed by: NURSE PRACTITIONER

## 2022-06-23 NOTE — ASSESSMENT & PLAN NOTE
· 6/23/2022  · The patient has had low back pains  · 3/6/2018 lumbar spine xray  FINDINGS:   Mild double curvature lumbar scoliosis    There is no evidence of acute fracture or destructive osseous lesion    Anterolateral marginal osteophyte formation at multiple levels of the visualized thoracolumbar spine  Mild degenerative changes posterior elements, L5-S1  Vacuum disc phenomenon at L3-4 through L5-S1    The pedicles appear intact    Atherosclerotic calcification abdominal aorta noted    Narrowing of left hip joint space indicative of degenerative osteoarthritis    IMPRESSION:   Degenerative changes, thoracolumbar spine and left hip    Degenerative disc disease, L3-4 through L5-S1  · 3/7/2018 MRI lumbar - with significant findings - see paper copy  · The patient is having continued low back pain  · She has asked about kidney stones as she has had such severe pains  · Will order the patient a KUB and monitor  · Continue with pain patches and bengay

## 2022-06-23 NOTE — PROGRESS NOTES
Assessment/Plan:     Problem List Items Addressed This Visit        Other    Chronic lower back pain - Primary     6/23/2022  The patient has had low back pains  3/6/2018 lumbar spine xray  FINDINGS:   Mild double curvature lumbar scoliosis    There is no evidence of acute fracture or destructive osseous lesion    Anterolateral marginal osteophyte formation at multiple levels of the visualized thoracolumbar spine  Mild degenerative changes posterior elements, L5-S1  Vacuum disc phenomenon at L3-4 through L5-S1    The pedicles appear intact    Atherosclerotic calcification abdominal aorta noted    Narrowing of left hip joint space indicative of degenerative osteoarthritis    IMPRESSION:   Degenerative changes, thoracolumbar spine and left hip    Degenerative disc disease, L3-4 through L5-S1   3/7/2018 MRI lumbar - with significant findings - see paper copy  The patient is having continued low back pain  She has asked about kidney stones as she has had such severe pains  Will order the patient a KUB and monitor  Continue with pain patches and bengay  Relevant Orders    XR abdomen 1 view kub          Subjective:      Patient ID: Betsey Montalvo is a 68 y o  female  The patient is here with low back pains and potential kidney stones  The following portions of the patient's history were reviewed and updated as appropriate:     Past Medical History:  She has a past medical history of Abnormal electrocardiogram, Chronic kidney disease, GERD (gastroesophageal reflux disease), Kidney stone, and Primary osteoarthritis involving multiple joints (11/7/2019)  ,  _______________________________________________________________________  Medical Problems:  does not have any pertinent problems on file ,  _______________________________________________________________________  Past Surgical History:   has a past surgical history that includes Tubal ligation; Tonsillectomy;  Tooth extraction; and Eye surgery  ,  _______________________________________________________________________  Family History:  family history includes Aneurysm in her father; Cirrhosis in her brother; Hypertension in her sister; Nephrolithiasis in her father; No Known Problems in her daughter, daughter, daughter, maternal aunt, maternal grandmother, paternal grandmother, and sister; Other in her daughter and mother ,  _______________________________________________________________________  Social History:   reports that she has quit smoking  She has never used smokeless tobacco  She reports current alcohol use  She reports that she does not use drugs  ,  _______________________________________________________________________  Allergies:  is allergic to influenza virus vaccine, furosemide, and doxycycline     _______________________________________________________________________  Current Outpatient Medications   Medication Sig Dispense Refill    cholecalciferol (VITAMIN D) 400 units/mL Take 400 Units by mouth daily      Omega-3 Fatty Acids (FISH OIL) 1,000 mg Fish Oil      metroNIDAZOLE (METROGEL) 0 75 % vaginal gel Insert 1 application into the vagina daily (Patient not taking: No sig reported) 70 g 0    Multiple Minerals-Vitamins (CALCIUM-MAGNESIUM-ZINC-D3 PO) Take 1 tablet by mouth in the morning (Patient not taking: Reported on 6/23/2022)      nystatin-triamcinolone (MYCOLOG-II) ointment Apply topically 2 (two) times a day (Patient not taking: No sig reported) 30 g 0    nystatin-triamcinolone (MYCOLOG-II) ointment Apply topically 2 (two) times a day (Patient not taking: No sig reported) 30 g 3    olopatadine (PATANOL) 0 1 % ophthalmic solution Administer 1 drop to both eyes 2 (two) times a day (Patient not taking: No sig reported) 5 mL 0    pantoprazole (PROTONIX) 20 mg tablet Take 20 mg by mouth daily (Patient not taking: No sig reported)      pyridoxine (VITAMIN B6) 100 mg tablet Vitamin B-6 (Patient not taking: No sig reported)  triamcinolone (KENALOG) 0 1 % cream triamcinolone acetonide 0 1 % topical cream (Patient not taking: No sig reported)       No current facility-administered medications for this visit      _______________________________________________________________________       Review of Systems   Musculoskeletal: Positive for arthralgias, back pain, gait problem and myalgias  Objective:  Vitals:    06/23/22 1502   BP: 120/72   BP Location: Left arm   Patient Position: Sitting   Cuff Size: Standard   Pulse: 73   Temp: 97 6 °F (36 4 °C)   SpO2: 97%   Weight: 76 2 kg (168 lb)   Height: 4' 11" (1 499 m)     Body mass index is 33 93 kg/m²       Physical Exam  Musculoskeletal:        Back:

## 2022-06-23 NOTE — PATIENT INSTRUCTIONS
Dermatomes:      ________________________________________________________________________________________    Pinterest Account - look for "food that cause Kidney Stones"    ________________________________________________________________________________________    WE would like to take a minute to say thank you for choosing [facility] as your PCP  As a team [facility] and Nathanael Nicole are always trying to make our patients feel more comfortable and important    Because you are important to us! After your appointment is complete you'll receive a brief survey either by text or on your MyChart  If you could take a couple moments and let us know how we are doing, we would really appreciate it  If you're happy with your appointment let us know that too! Thank you! Please remember we are always here for you! We look forward to hearing from you! Stay healthy! Kaia Nicole     _______________________________________________________________________________    Problem List Items Addressed This Visit          Other    Chronic lower back pain - Primary     6/23/2022  The patient has had low back pains  3/6/2018 lumbar spine xray  FINDINGS:   Mild double curvature lumbar scoliosis  There is no evidence of acute fracture or destructive osseous lesion  Anterolateral marginal osteophyte formation at multiple levels of the visualized thoracolumbar spine  Mild degenerative changes posterior elements, L5-S1  Vacuum disc phenomenon at L3-4 through L5-S1  The pedicles appear intact  Atherosclerotic calcification abdominal aorta noted  Narrowing of left hip joint space indicative of degenerative osteoarthritis  IMPRESSION:   Degenerative changes, thoracolumbar spine and left hip     Degenerative disc disease, L3-4 through L5-S1   3/7/2018 MRI lumbar - with significant findings - see paper copy  The patient is having continued low back pain  She has asked about kidney stones as she has had such severe pains  Will order the patient a KUB and monitor  Continue with pain patches and bengay

## 2022-06-24 ENCOUNTER — TELEPHONE (OUTPATIENT)
Dept: OTHER | Facility: OTHER | Age: 77
End: 2022-06-24

## 2022-06-24 NOTE — LETTER
June 24, 2022     Patient: Jose A Armstrong  YOB: 1945  Date of Visit: 6/23/2022    To Whom it May Concern:    Jose A Armstrong is under my professional care  Vanessa Quispe was seen in my office on 6/23/2022  Your KUB (the xray taken on 6/23/2022) did not show any kidney stones  It did show "moderate to Severe left Hip Osteoarthritis"  It also showed a "Hydroxyapatite (HA) crystal deposition disease (HADD) is a well-recognized systemic disease of unknown etiology that is caused by para-articular and/or intra-articular deposition of HA crystals  The disease is clinically manifested by localized pain, swelling, and tenderness about the affected joint along with variable limitation of joint motion, although not all patients are symptomatic  Plain radiographs may show calcifications of varying size and shape in the para-articular tendons, bursae, and capsule  The disease may be mono- or polyarticular in distribution  The shoulder is most commonly involved with calcification in the supraspinatus tendon  When intra-articular, HA crystals can cause joint destruction  Any joint can be involved; the shoulder is most commonly affected, resulting in "Pierce shoulder " Treatment of HADD usually requires use of analgesics, local heat, needling with or without aspiration of the calcific deposits, steroid injections, and, at times, even surgery for relief of pain "    I attached the xray picture of the Lumbar spine xray that we reviewed from 3/6/2018  I attached the xray picture of the KUB xray you had on 6/23/2022  If you have any questions or concerns, please don't hesitate to call             Sincerely,          REGINA Garner        CC: No Recipients

## 2022-06-24 NOTE — PROGRESS NOTES
A letter was created for this patient with attached pictures of her xrays to allow her to visibly see her lumbar spine damage  She was also found to have a hydroxyapatite crystal deposition within the left hamstring origin  I sent her information regarding this issue

## 2022-07-13 ENCOUNTER — OFFICE VISIT (OUTPATIENT)
Dept: INTERNAL MEDICINE CLINIC | Facility: CLINIC | Age: 77
End: 2022-07-13
Payer: COMMERCIAL

## 2022-07-13 VITALS
HEART RATE: 81 BPM | SYSTOLIC BLOOD PRESSURE: 110 MMHG | TEMPERATURE: 98.3 F | WEIGHT: 167.4 LBS | HEIGHT: 59 IN | BODY MASS INDEX: 33.75 KG/M2 | DIASTOLIC BLOOD PRESSURE: 67 MMHG | OXYGEN SATURATION: 98 %

## 2022-07-13 DIAGNOSIS — K21.9 GERD WITHOUT ESOPHAGITIS: ICD-10-CM

## 2022-07-13 DIAGNOSIS — M54.50 CHRONIC LOW BACK PAIN, UNSPECIFIED BACK PAIN LATERALITY, UNSPECIFIED WHETHER SCIATICA PRESENT: ICD-10-CM

## 2022-07-13 DIAGNOSIS — M15.9 PRIMARY OSTEOARTHRITIS INVOLVING MULTIPLE JOINTS: Primary | ICD-10-CM

## 2022-07-13 DIAGNOSIS — M11.052: ICD-10-CM

## 2022-07-13 DIAGNOSIS — G89.29 CHRONIC LOW BACK PAIN, UNSPECIFIED BACK PAIN LATERALITY, UNSPECIFIED WHETHER SCIATICA PRESENT: ICD-10-CM

## 2022-07-13 PROCEDURE — 1160F RVW MEDS BY RX/DR IN RCRD: CPT | Performed by: NURSE PRACTITIONER

## 2022-07-13 PROCEDURE — 99214 OFFICE O/P EST MOD 30 MIN: CPT | Performed by: NURSE PRACTITIONER

## 2022-07-13 RX ORDER — PANTOPRAZOLE SODIUM 20 MG/1
20 TABLET, DELAYED RELEASE ORAL DAILY
Qty: 90 TABLET | Refills: 3 | Status: SHIPPED | OUTPATIENT
Start: 2022-07-13

## 2022-07-13 RX ORDER — TRAMADOL HYDROCHLORIDE 50 MG/1
50 TABLET ORAL EVERY 6 HOURS PRN
Qty: 30 TABLET | Refills: 0 | Status: SHIPPED | OUTPATIENT
Start: 2022-07-13

## 2022-07-13 NOTE — PATIENT INSTRUCTIONS
Problem List Items Addressed This Visit          Digestive    GERD without esophagitis     Will restart protonix            Relevant Medications    pantoprazole (PROTONIX) 20 mg tablet       Musculoskeletal and Integument    Primary osteoarthritis involving multiple joints - Primary     Likely hydroxyapatite crystal deposition within the left hamstring origin  Moderate to severe left hip osteoarthritis           Relevant Medications    Diclofenac Sodium (VOLTAREN) 1 %    traMADol (Ultram) 50 mg tablet    Other Relevant Orders    Ambulatory Referral to Orthopedic Surgery    Hydroxyapatite deposition disease of hip, left     6/23/2022 KUB:  Likely hydroxyapatite crystal deposition within the left hamstring origin  Moderate to severe left hip osteoarthritis  No acute osseous abnormality              Other    Chronic lower back pain      6/23/2022  The patient has had low back pains  3/6/2018 lumbar spine xray  FINDINGS:   Mild double curvature lumbar scoliosis  There is no evidence of acute fracture or destructive osseous lesion  Anterolateral marginal osteophyte formation at multiple levels of the visualized thoracolumbar spine  Mild degenerative changes posterior elements, L5-S1  Vacuum disc phenomenon at L3-4 through L5-S1  The pedicles appear intact  Atherosclerotic calcification abdominal aorta noted  Narrowing of left hip joint space indicative of degenerative osteoarthritis  IMPRESSION:   Degenerative changes, thoracolumbar spine and left hip  Degenerative disc disease, L3-4 through L5-S1   3/7/2018 MRI lumbar - with significant findings - see paper copy  The patient is having continued low back pain  She has asked about kidney stones as she has had such severe pains  Will order the patient a KUB and monitor  Continue with pain patches and bengay         7/13/2022  Patient is here with continued complaints of low back pain  She is having 10/10 pain  Will refer her to Dr Melany Greene from orthopedics  Will start her on ultram  Will order an MRI of lumbar spine due to worsening ambulatory issues, hip pains           Relevant Medications    Diclofenac Sodium (VOLTAREN) 1 %    traMADol (Ultram) 50 mg tablet    Other Relevant Orders    MRI lumbar spine wo contrast

## 2022-07-13 NOTE — ASSESSMENT & PLAN NOTE
· 6/23/2022 KUB:  Likely hydroxyapatite crystal deposition within the left hamstring origin  Moderate to severe left hip osteoarthritis  No acute osseous abnormality

## 2022-07-13 NOTE — ASSESSMENT & PLAN NOTE
Likely hydroxyapatite crystal deposition within the left hamstring origin  Moderate to severe left hip osteoarthritis

## 2022-07-13 NOTE — ASSESSMENT & PLAN NOTE
· 6/23/2022  · The patient has had low back pains  · 3/6/2018 lumbar spine xray  FINDINGS:   Mild double curvature lumbar scoliosis    There is no evidence of acute fracture or destructive osseous lesion    Anterolateral marginal osteophyte formation at multiple levels of the visualized thoracolumbar spine   Mild degenerative changes posterior elements, L5-S1  Vacuum disc phenomenon at L3-4 through L5-S1    The pedicles appear intact    Atherosclerotic calcification abdominal aorta noted    Narrowing of left hip joint space indicative of degenerative osteoarthritis    IMPRESSION:   Degenerative changes, thoracolumbar spine and left hip    Degenerative disc disease, L3-4 through L5-S1  · 3/7/2018 MRI lumbar - with significant findings - see paper copy  · The patient is having continued low back pain  · She has asked about kidney stones as she has had such severe pains  · Will order the patient a KUB and monitor     · Continue with pain patches and bengay        · 7/13/2022  · Patient is here with continued complaints of low back pain  · She is having 10/10 pain  · Will refer her to Dr Toby Lorenzana from orthopedics  · Will start her on ultram  · Will order an MRI of lumbar spine due to worsening ambulatory issues, hip pains

## 2022-07-13 NOTE — PROGRESS NOTES
Assessment/Plan:     Problem List Items Addressed This Visit        Digestive    GERD without esophagitis     Will restart protonix            Relevant Medications    pantoprazole (PROTONIX) 20 mg tablet       Musculoskeletal and Integument    Primary osteoarthritis involving multiple joints - Primary     Likely hydroxyapatite crystal deposition within the left hamstring origin  Moderate to severe left hip osteoarthritis           Relevant Medications    Diclofenac Sodium (VOLTAREN) 1 %    traMADol (Ultram) 50 mg tablet    Other Relevant Orders    Ambulatory Referral to Orthopedic Surgery    Hydroxyapatite deposition disease of hip, left     6/23/2022 KUB:  Likely hydroxyapatite crystal deposition within the left hamstring origin  Moderate to severe left hip osteoarthritis  No acute osseous abnormality              Other    Chronic lower back pain      6/23/2022  The patient has had low back pains  3/6/2018 lumbar spine xray  FINDINGS:   Mild double curvature lumbar scoliosis    There is no evidence of acute fracture or destructive osseous lesion    Anterolateral marginal osteophyte formation at multiple levels of the visualized thoracolumbar spine   Mild degenerative changes posterior elements, L5-S1  Vacuum disc phenomenon at L3-4 through L5-S1    The pedicles appear intact    Atherosclerotic calcification abdominal aorta noted    Narrowing of left hip joint space indicative of degenerative osteoarthritis    IMPRESSION:   Degenerative changes, thoracolumbar spine and left hip    Degenerative disc disease, L3-4 through L5-S1   3/7/2018 MRI lumbar - with significant findings - see paper copy  The patient is having continued low back pain  She has asked about kidney stones as she has had such severe pains  Will order the patient a KUB and monitor     Continue with pain patches and bengay        7/13/2022  Patient is here with continued complaints of low back pain  She is having 10/10 pain  Will refer her to   Mike from orthopedics  Will start her on ultram  Will order an MRI of lumbar spine due to worsening ambulatory issues, hip pains           Relevant Medications    Diclofenac Sodium (VOLTAREN) 1 %    traMADol (Ultram) 50 mg tablet    Other Relevant Orders    MRI lumbar spine wo contrast          Subjective:      Patient ID: Suma Cheatham is a 68 y o  female  The patient is here with continued and worsening low back pain  The following portions of the patient's history were reviewed and updated as appropriate:     Past Medical History:  She has a past medical history of Abnormal electrocardiogram, Chronic kidney disease, GERD (gastroesophageal reflux disease), Kidney stone, and Primary osteoarthritis involving multiple joints (11/7/2019)  ,  _______________________________________________________________________  Medical Problems:  does not have any pertinent problems on file ,  _______________________________________________________________________  Past Surgical History:   has a past surgical history that includes Tubal ligation; Tonsillectomy; Tooth extraction; and Eye surgery  ,  _______________________________________________________________________  Family History:  family history includes Aneurysm in her father; Cirrhosis in her brother; Hypertension in her sister; Nephrolithiasis in her father; No Known Problems in her daughter, daughter, daughter, maternal aunt, maternal grandmother, paternal grandmother, and sister; Other in her daughter and mother ,  _______________________________________________________________________  Social History:   reports that she has quit smoking  She has never used smokeless tobacco  She reports current alcohol use  She reports that she does not use drugs  ,  _______________________________________________________________________  Allergies:  is allergic to influenza virus vaccine, furosemide, and doxycycline     _______________________________________________________________________  Current Outpatient Medications   Medication Sig Dispense Refill    cholecalciferol (VITAMIN D) 400 units/mL Take 400 Units by mouth daily      COD LIVER OIL W/VIT A & D PO Take 1 capsule by mouth in the morning      Diclofenac Sodium (VOLTAREN) 1 % Apply 2 g topically 4 (four) times a day 100 g 3    Multiple Minerals-Vitamins (CALCIUM-MAGNESIUM-ZINC-D3 PO) Take 1 tablet by mouth in the morning      pantoprazole (PROTONIX) 20 mg tablet Take 1 tablet (20 mg total) by mouth daily 90 tablet 3    Probiotic Product (PROBIOTIC BLEND PO) Take 1 capsule by mouth in the morning SYNBIOTIC      traMADol (Ultram) 50 mg tablet Take 1 tablet (50 mg total) by mouth every 6 (six) hours as needed for moderate pain 30 tablet 0     No current facility-administered medications for this visit      _______________________________________________________________________      Review of Systems   Musculoskeletal: Positive for arthralgias, back pain, gait problem, joint swelling and myalgias  Objective:  Vitals:    07/13/22 1043   BP: 110/67   Pulse: 81   Temp: 98 3 °F (36 8 °C)   TempSrc: Tympanic   SpO2: 98%   Weight: 75 9 kg (167 lb 6 4 oz)   Height: 4' 11" (1 499 m)     Body mass index is 33 81 kg/m²  Physical Exam  Musculoskeletal:      Lumbar back: Spasms and tenderness present  Decreased range of motion        Comments: Chronic low back pain

## 2022-07-21 ENCOUNTER — HOSPITAL ENCOUNTER (OUTPATIENT)
Dept: MRI IMAGING | Facility: HOSPITAL | Age: 77
Discharge: HOME | End: 2022-07-21
Payer: COMMERCIAL

## 2022-07-21 DIAGNOSIS — M54.50 CHRONIC LOW BACK PAIN, UNSPECIFIED BACK PAIN LATERALITY, UNSPECIFIED WHETHER SCIATICA PRESENT: ICD-10-CM

## 2022-07-21 DIAGNOSIS — G89.29 CHRONIC LOW BACK PAIN, UNSPECIFIED BACK PAIN LATERALITY, UNSPECIFIED WHETHER SCIATICA PRESENT: ICD-10-CM

## 2022-07-21 PROCEDURE — 72148 MRI LUMBAR SPINE W/O DYE: CPT

## 2022-07-21 PROCEDURE — G1004 CDSM NDSC: HCPCS

## 2022-07-27 ENCOUNTER — TELEPHONE (OUTPATIENT)
Dept: INTERNAL MEDICINE CLINIC | Facility: CLINIC | Age: 77
End: 2022-07-27

## 2022-07-27 DIAGNOSIS — G89.29 CHRONIC LOW BACK PAIN, UNSPECIFIED BACK PAIN LATERALITY, UNSPECIFIED WHETHER SCIATICA PRESENT: Primary | ICD-10-CM

## 2022-07-27 DIAGNOSIS — M54.50 CHRONIC LOW BACK PAIN, UNSPECIFIED BACK PAIN LATERALITY, UNSPECIFIED WHETHER SCIATICA PRESENT: Primary | ICD-10-CM

## 2022-07-27 NOTE — TELEPHONE ENCOUNTER
re: MRI  Received: Today  Caitlin HolsteinREGINA [facility]  Please call the patient and let her know that I placed a referral to Dr Mckenzie Jefferson from Neurosurgery in [place]  Her MRI of her lumbar spine done on 7/25/2022 is showing some significant worsening from her last one on 3/7/2018  She now has severe 'encroachment' of her discs and spine which could cause permanent damage if not treated   Thank you

## 2022-08-04 ENCOUNTER — TELEPHONE (OUTPATIENT)
Dept: INTERNAL MEDICINE CLINIC | Facility: CLINIC | Age: 77
End: 2022-08-04

## 2022-08-09 ENCOUNTER — TELEPHONE (OUTPATIENT)
Dept: INTERNAL MEDICINE CLINIC | Facility: CLINIC | Age: 77
End: 2022-08-09

## 2022-08-09 DIAGNOSIS — R60.0 BILATERAL LOWER EXTREMITY EDEMA: Primary | ICD-10-CM

## 2022-08-09 RX ORDER — HYDROCHLOROTHIAZIDE 12.5 MG/1
12.5 TABLET ORAL DAILY
Qty: 90 TABLET | Refills: 0 | Status: SHIPPED | OUTPATIENT
Start: 2022-08-09

## 2022-08-09 NOTE — TELEPHONE ENCOUNTER
Could you call the patient back and ask her what type of allergie she has to furosemide (lasix) (a water pill)  She would need to have a water pill to help with the swelling, but I would like to know what type of reaction she had with the last one  Also, could you ask if she has ever taken any other kind of water pill   TY

## 2022-08-09 NOTE — TELEPHONE ENCOUNTER
Pt states she does not remember having a reaction to a water pill  She is taking hydrochlorothiazide states it seems to help  Pt states the script is quite old

## 2022-08-09 NOTE — TELEPHONE ENCOUNTER
Pt called states that her legs are swollen and she is retaining water  She is leaving for [place] in up coming weeks  Can you call her something in or would you like her to be seen in office?      Thank you

## 2022-08-11 ENCOUNTER — OFFICE VISIT (OUTPATIENT)
Dept: INTERNAL MEDICINE CLINIC | Facility: CLINIC | Age: 77
End: 2022-08-11
Payer: COMMERCIAL

## 2022-08-11 VITALS
OXYGEN SATURATION: 97 % | TEMPERATURE: 97.7 F | DIASTOLIC BLOOD PRESSURE: 68 MMHG | HEIGHT: 59 IN | WEIGHT: 167 LBS | HEART RATE: 83 BPM | BODY MASS INDEX: 33.67 KG/M2 | SYSTOLIC BLOOD PRESSURE: 110 MMHG

## 2022-08-11 DIAGNOSIS — R93.7 ABNORMAL MRI, LUMBAR SPINE: ICD-10-CM

## 2022-08-11 DIAGNOSIS — R60.0 BILATERAL LOWER EXTREMITY EDEMA: Primary | ICD-10-CM

## 2022-08-11 PROCEDURE — 99213 OFFICE O/P EST LOW 20 MIN: CPT | Performed by: NURSE PRACTITIONER

## 2022-08-11 NOTE — PATIENT INSTRUCTIONS
Problem List Items Addressed This Visit          Other    Bilateral lower extremity edema - Primary     8/9/2022  Patient called stating that her bilateral lower extremities were edematous  She stated that she had taken some of her old prescription of HCTZ  I ordered her a new prescription of HCTZ  We discussed dermatomes  She is dong well on the HCTZ  Will continue this medication  She is safe to travel  She is safe to run the sweeper  She is safe to bend - as long as she does not 'over-do-it'           Abnormal MRI, lumbar spine     7/21/2022  FINDINGS:     VERTEBRAL BODIES:  There are 5 lumbar type vertebral bodies  S-shaped thoracal lumbar scoliosis  No compression fracture  Modic type I endplate changes are noted at the L3-4 level  No discrete marrow lesion  SACRUM:  Normal signal within the sacrum  No evidence of insufficiency or stress fracture  DISTAL CORD AND CONUS:  Normal size and signal within the distal cord and conus  The conus terminates at the L1 level  The cauda equina nerve roots appear within normal limits  PARASPINAL SOFT TISSUES:  There is dependent subcutaneous edema within the lower back region  LOWER THORACIC DISC SPACES:  Diffuse disc bulge is noted at the T11-12 level with mild central canal narrowing  LUMBAR DISC SPACES:     L1-2: Diffuse disc bulge is again noted with a superimposed small broad-based central disc protrusion  No central canal or subarticular/lateral recess narrowing  Moderate right neural foraminal stenosis is again noted  L2-3: Diffuse disc bulge, eccentric to the left with superimposed broad-based central and left foraminal disc protrusions  Moderate left subarticular/lateral recess narrowing  Mild left neural foraminal stenosis is again noted  L3-4: Moderate diffuse disc bulge is noted extending into the foraminal regions with spondylotic ridging  There is bilateral ligamentum flavum and facet hypertrophy    Mild central canal and bilateral subarticular/lateral recess narrowing  Mild   bilateral neural foraminal stenosis  L4-5: Diffuse disc bulge is again noted with a superimposed broad-based central disc protrusion  There is bilateral ligamentum flavum and facet hypertrophy     Mild central canal and bilateral subarticular/lateral recess narrowing  Mild bilateral neural   foraminal stenosis  L5-S1: Diffuse disc bulge is noted with spondylotic ridging  There is bilateral facet hypertrophy  Mild central canal and new moderate to severe left subarticular/lateral recess narrowing with encroachment of the traversing left S1 nerve root  Moderate to severe bilateral neural foraminal stenosis is again noted with encroachment of the bilateral exiting L5 nerve roots        IMPRESSION:  1  S-shaped thoracolumbar scoliosis  The lumbar vertebral body heights are maintained demonstrating no acute fracture or subluxation  2  Multilevel degenerative disc disease with no new disc herniation  New moderate to severe left L5-S1 subarticular/lateral recess narrowing with encroachment of the traversing left S1 nerve root  Persistent moderate to severe bilateral L5-S1 neural   foraminal narrowing with encroachment of the exiting L5 nerve roots  Correlate with radiculopathy symptoms    3

## 2022-08-11 NOTE — PROGRESS NOTES
Assessment/Plan:     Problem List Items Addressed This Visit        Other    Bilateral lower extremity edema - Primary     8/9/2022  Patient called stating that her bilateral lower extremities were edematous  She stated that she had taken some of her old prescription of HCTZ  I ordered her a new prescription of HCTZ  We discussed dermatomes  She is dong well on the HCTZ  Will continue this medication  She is safe to travel  She is safe to run the sweeper  She is safe to bend - as long as she does not 'over-do-it'         Abnormal MRI, lumbar spine     7/21/2022  FINDINGS:     VERTEBRAL BODIES:  There are 5 lumbar type vertebral bodies  S-shaped thoracal lumbar scoliosis  No compression fracture  Modic type I endplate changes are noted at the L3-4 level  No discrete marrow lesion      SACRUM:  Normal signal within the sacrum  No evidence of insufficiency or stress fracture      DISTAL CORD AND CONUS:  Normal size and signal within the distal cord and conus  The conus terminates at the L1 level  The cauda equina nerve roots appear within normal limits      PARASPINAL SOFT TISSUES:  There is dependent subcutaneous edema within the lower back region      LOWER THORACIC DISC SPACES:  Diffuse disc bulge is noted at the T11-12 level with mild central canal narrowing      LUMBAR DISC SPACES:     L1-2: Diffuse disc bulge is again noted with a superimposed small broad-based central disc protrusion  No central canal or subarticular/lateral recess narrowing  Moderate right neural foraminal stenosis is again noted      L2-3: Diffuse disc bulge, eccentric to the left with superimposed broad-based central and left foraminal disc protrusions  Moderate left subarticular/lateral recess narrowing  Mild left neural foraminal stenosis is again noted      L3-4: Moderate diffuse disc bulge is noted extending into the foraminal regions with spondylotic ridging  There is bilateral ligamentum flavum and facet hypertrophy    Mild central canal and bilateral subarticular/lateral recess narrowing  Mild   bilateral neural foraminal stenosis      L4-5: Diffuse disc bulge is again noted with a superimposed broad-based central disc protrusion  There is bilateral ligamentum flavum and facet hypertrophy     Mild central canal and bilateral subarticular/lateral recess narrowing  Mild bilateral neural   foraminal stenosis      L5-S1: Diffuse disc bulge is noted with spondylotic ridging  There is bilateral facet hypertrophy  Mild central canal and new moderate to severe left subarticular/lateral recess narrowing with encroachment of the traversing left S1 nerve root  Moderate to severe bilateral neural foraminal stenosis is again noted with encroachment of the bilateral exiting L5 nerve roots        IMPRESSION:  1  S-shaped thoracolumbar scoliosis  The lumbar vertebral body heights are maintained demonstrating no acute fracture or subluxation  2  Multilevel degenerative disc disease with no new disc herniation  New moderate to severe left L5-S1 subarticular/lateral recess narrowing with encroachment of the traversing left S1 nerve root  Persistent moderate to severe bilateral L5-S1 neural   foraminal narrowing with encroachment of the exiting L5 nerve roots  Correlate with radiculopathy symptoms  3                   Subjective:      Patient ID: Josiane Varghese is a 68 y o  female  HPI    The following portions of the patient's history were reviewed and updated as appropriate:     Past Medical History:  She has a past medical history of Abnormal electrocardiogram, Chronic kidney disease, GERD (gastroesophageal reflux disease), Kidney stone, and Primary osteoarthritis involving multiple joints (11/7/2019)  ,  _______________________________________________________________________  Medical Problems:  does not have any pertinent problems on file ,  _______________________________________________________________________  Past Surgical History:   has a past surgical history that includes Tubal ligation; Tonsillectomy; Tooth extraction; and Eye surgery  ,  _______________________________________________________________________  Family History:  family history includes Aneurysm in her father; Cirrhosis in her brother; Hypertension in her sister; Nephrolithiasis in her father; No Known Problems in her daughter, daughter, daughter, maternal aunt, maternal grandmother, paternal grandmother, and sister; Other in her daughter and mother ,  _______________________________________________________________________  Social History:   reports that she has quit smoking  She has never used smokeless tobacco  She reports current alcohol use  She reports that she does not use drugs  ,  _______________________________________________________________________  Allergies:  is allergic to influenza virus vaccine and doxycycline     _______________________________________________________________________  Current Outpatient Medications   Medication Sig Dispense Refill    cholecalciferol (VITAMIN D) 400 units/mL Take 400 Units by mouth daily      COD LIVER OIL W/VIT A & D PO Take 1 capsule by mouth in the morning      Diclofenac Sodium (VOLTAREN) 1 % Apply 2 g topically 4 (four) times a day 100 g 3    hydrochlorothiazide (HYDRODIURIL) 12 5 mg tablet Take 1 tablet (12 5 mg total) by mouth daily 90 tablet 0    Multiple Minerals-Vitamins (CALCIUM-MAGNESIUM-ZINC-D3 PO) Take 1 tablet by mouth in the morning      pantoprazole (PROTONIX) 20 mg tablet Take 1 tablet (20 mg total) by mouth daily 90 tablet 3    Probiotic Product (PROBIOTIC BLEND PO) Take 1 capsule by mouth in the morning SYNBIOTIC      traMADol (Ultram) 50 mg tablet Take 1 tablet (50 mg total) by mouth every 6 (six) hours as needed for moderate pain 30 tablet 0     No current facility-administered medications for this visit      _______________________________________________________________________      Review of Systems      Objective:  Vitals:    08/11/22 1250   BP: 110/68   BP Location: Left arm   Patient Position: Sitting   Cuff Size: Standard   Pulse: 83   Temp: 97 7 °F (36 5 °C)   SpO2: 97%   Weight: 75 8 kg (167 lb)   Height: 4' 11" (1 499 m)     Body mass index is 33 73 kg/m²       Physical Exam

## 2022-08-11 NOTE — ASSESSMENT & PLAN NOTE
· 8/9/2022  · Patient called stating that her bilateral lower extremities were edematous  · She stated that she had taken some of her old prescription of HCTZ    · I ordered her a new prescription of HCTZ  · We discussed dermatomes  · She is dong well on the HCTZ  · Will continue this medication  · She is safe to travel  · She is safe to run the sweeper  · She is safe to bend - as long as she does not 'over-do-it'

## 2022-08-11 NOTE — ASSESSMENT & PLAN NOTE
· 7/21/2022  FINDINGS:     VERTEBRAL BODIES:  There are 5 lumbar type vertebral bodies  S-shaped thoracal lumbar scoliosis  No compression fracture  Modic type I endplate changes are noted at the L3-4 level  No discrete marrow lesion      SACRUM:  Normal signal within the sacrum  No evidence of insufficiency or stress fracture      DISTAL CORD AND CONUS:  Normal size and signal within the distal cord and conus  The conus terminates at the L1 level  The cauda equina nerve roots appear within normal limits      PARASPINAL SOFT TISSUES:  There is dependent subcutaneous edema within the lower back region      LOWER THORACIC DISC SPACES:  Diffuse disc bulge is noted at the T11-12 level with mild central canal narrowing      LUMBAR DISC SPACES:     L1-2: Diffuse disc bulge is again noted with a superimposed small broad-based central disc protrusion  No central canal or subarticular/lateral recess narrowing  Moderate right neural foraminal stenosis is again noted      L2-3: Diffuse disc bulge, eccentric to the left with superimposed broad-based central and left foraminal disc protrusions  Moderate left subarticular/lateral recess narrowing  Mild left neural foraminal stenosis is again noted      L3-4: Moderate diffuse disc bulge is noted extending into the foraminal regions with spondylotic ridging  There is bilateral ligamentum flavum and facet hypertrophy  Mild central canal and bilateral subarticular/lateral recess narrowing  Mild   bilateral neural foraminal stenosis      L4-5: Diffuse disc bulge is again noted with a superimposed broad-based central disc protrusion  There is bilateral ligamentum flavum and facet hypertrophy     Mild central canal and bilateral subarticular/lateral recess narrowing  Mild bilateral neural   foraminal stenosis      L5-S1: Diffuse disc bulge is noted with spondylotic ridging  There is bilateral facet hypertrophy    Mild central canal and new moderate to severe left subarticular/lateral recess narrowing with encroachment of the traversing left S1 nerve root  Moderate to severe bilateral neural foraminal stenosis is again noted with encroachment of the bilateral exiting L5 nerve roots        IMPRESSION:  1  S-shaped thoracolumbar scoliosis  The lumbar vertebral body heights are maintained demonstrating no acute fracture or subluxation  2  Multilevel degenerative disc disease with no new disc herniation  New moderate to severe left L5-S1 subarticular/lateral recess narrowing with encroachment of the traversing left S1 nerve root  Persistent moderate to severe bilateral L5-S1 neural   foraminal narrowing with encroachment of the exiting L5 nerve roots  Correlate with radiculopathy symptoms    3

## 2022-08-25 ENCOUNTER — CONSULT (OUTPATIENT)
Dept: NEUROSURGERY | Facility: CLINIC | Age: 77
End: 2022-08-25
Payer: COMMERCIAL

## 2022-08-25 VITALS
HEART RATE: 76 BPM | DIASTOLIC BLOOD PRESSURE: 76 MMHG | OXYGEN SATURATION: 99 % | SYSTOLIC BLOOD PRESSURE: 128 MMHG | TEMPERATURE: 97.6 F | HEIGHT: 60 IN | BODY MASS INDEX: 31.22 KG/M2 | WEIGHT: 159 LBS

## 2022-08-25 DIAGNOSIS — G89.29 CHRONIC LOW BACK PAIN, UNSPECIFIED BACK PAIN LATERALITY, UNSPECIFIED WHETHER SCIATICA PRESENT: Primary | ICD-10-CM

## 2022-08-25 DIAGNOSIS — M54.50 CHRONIC LOW BACK PAIN, UNSPECIFIED BACK PAIN LATERALITY, UNSPECIFIED WHETHER SCIATICA PRESENT: Primary | ICD-10-CM

## 2022-08-25 PROCEDURE — 1160F RVW MEDS BY RX/DR IN RCRD: CPT | Performed by: NURSE PRACTITIONER

## 2022-08-25 PROCEDURE — 99203 OFFICE O/P NEW LOW 30 MIN: CPT | Performed by: NURSE PRACTITIONER

## 2022-08-25 RX ORDER — HYDROXYZINE HYDROCHLORIDE 25 MG/1
TABLET, FILM COATED ORAL
COMMUNITY

## 2022-08-25 RX ORDER — OLOPATADINE HYDROCHLORIDE 2 MG/ML
SOLUTION/ DROPS OPHTHALMIC
COMMUNITY

## 2022-08-25 NOTE — ASSESSMENT & PLAN NOTE
Presents as referral from PCP for evaluation of acute on chronic low back pain  · Ongoing x a few years, significantly worsening x 6 months  · Complains of pain that is constant in low back and radiates to left groin and leg  · On tramadol PRN for pain, takes very seldom  · Scheduled to start PT in the upcoming weeks  · On exam with some hip flexion/extension weakness, 4/5, otherwise intact  Imaging:  · MRI lumbar spine wo, 7/21/2022: 1  S-shaped thoracolumbar scoliosis  The lumbar vertebral body heights are maintained demonstrating no acute fracture or subluxation  2  Multilevel degenerative disc disease with no new disc herniation  New moderate to severe left L5-S1 subarticular/lateral recess narrowing with encroachment of the traversing left S1 nerve root  Persistent moderate to severe bilateral L5-S1 neural foraminal narrowing with encroachment of the exiting L5 nerve roots  Correlate with radiculopathy symptoms  Plan:  · Reviewed imaging extensively with patient and daughter  · Reviewed that on MRI she has evidence of diffuse degenerative disease that is likely contributing to pain  Symptoms are not totally consistent with S1 radiculopathy as she is experiencing pain diffusely to left leg including to groin  · Would recommend trial of conservative management of symptoms before consideration for surgical intervention  · Referral placed to pain management  · Reviewed that should symptoms worsen or should she develop worsening numbness, weakness or bowel or bladder incontinence, she should return for evaluation  · Patient and daughter verbalized understanding and are in agreement with plan

## 2022-08-25 NOTE — PROGRESS NOTES
Neurosurgery Office Note  Tessie Caicedo 68 y o  female MRN: [id number]      Assessment/Plan     Chronic lower back pain  Presents as referral from PCP for evaluation of acute on chronic low back pain  · Ongoing x a few years, significantly worsening x 6 months  · Complains of pain that is constant in low back and radiates to left groin and leg  · On tramadol PRN for pain, takes very seldom  · Scheduled to start PT in the upcoming weeks  · On exam with some hip flexion/extension weakness, 4/5, otherwise intact  Imaging:  · MRI lumbar spine wo, 7/21/2022: 1  S-shaped thoracolumbar scoliosis  The lumbar vertebral body heights are maintained demonstrating no acute fracture or subluxation  2  Multilevel degenerative disc disease with no new disc herniation  New moderate to severe left L5-S1 subarticular/lateral recess narrowing with encroachment of the traversing left S1 nerve root  Persistent moderate to severe bilateral L5-S1 neural foraminal narrowing with encroachment of the exiting L5 nerve roots  Correlate with radiculopathy symptoms  Plan:  · Reviewed imaging extensively with patient and daughter  · Reviewed that on MRI she has evidence of diffuse degenerative disease that is likely contributing to pain  Symptoms are not totally consistent with S1 radiculopathy as she is experiencing pain diffusely to left leg including to groin  · Would recommend trial of conservative management of symptoms before consideration for surgical intervention  · Referral placed to pain management  · Reviewed that should symptoms worsen or should she develop worsening numbness, weakness or bowel or bladder incontinence, she should return for evaluation  · Patient and daughter verbalized understanding and are in agreement with plan         Diagnoses and all orders for this visit:    Chronic low back pain, unspecified back pain laterality, unspecified whether sciatica present  -     Ambulatory Referral to Neurosurgery  -     Ambulatory Referral to Pain Management; Future    Other orders  -     hydrOXYzine HCL (ATARAX) 25 mg tablet; hydroxyzine HCl 25 mg tablet   TAKE 1 TABLET BY MOUTH EVERY 6 HOURS AS NEEDED FOR ITCHING  -     olopatadine HCl (PATADAY) 0 2 % opth drops; olopatadine 0 2 % eye drops   INSTILL 1 DROP INTO AFFECTED EYE(S) ONCE DAILY          I spent 30 minutes with the patient today in which >50% of the time was spent counseling/coordination of care regarding diagnosis, imaging review, symptoms and treatment plan  CHIEF COMPLAINT    Chief Complaint   Patient presents with    Consult    Back Pain       HISTORY    History of Present Illness     68y o  year old female     With past medical history of GERD, CKD, who presents as referral from primary care provider for symptoms of low back pain radiating into her left leg  She states that she is had issues with low back pain for quite some time but over the past 6 months became worse and constant  She states the pain will often radiate into her left leg circumferentially down to her feet  Occasionally associated with some numbness to her mid calf and her foot  She also noted edema to her bilateral lower extremities  She describes bilateral arm pain as well  She states the pain is worse when she stands up after having sat for a while  She describes pain that is sharp and constant radiating across her back into her left hip and down her leg  She takes an occasional tramadol for the pain but tries to use the seldom  She describes that she is very active and likes to garden and she is to play a lot of paddle ball  She is had to limit her activity secondary to her pain  She was referred to physical therapy and plans to start in the next 2 weeks but for she is traveling to [place] to visit her other daughter  She is never seen a pain management physician  See Discussion    REVIEW OF SYSTEMS    Review of Systems   Constitutional: Negative  HENT: Negative  Eyes: Negative  Respiratory: Negative  Cardiovascular: Negative  Gastrointestinal: Negative  Endocrine: Negative  Genitourinary: Positive for urgency  Urinary incontinence x3 weeks, no hesitancy   Musculoskeletal: Positive for back pain (left low back radiating down leg from buttock to foot and into groin, and perineum) and gait problem (pain with walking)  No PT, starting 9/8  No PM   Skin: Negative  Allergic/Immunologic: Negative  Neurological: Positive for weakness (left leg, feel like it may give out, has not fallen) and numbness (tingling in left foot, ankle, and calf)  Hematological: Negative  Psychiatric/Behavioral: Positive for sleep disturbance (pain wakes her)  All other systems reviewed and are negative  ROS reviewed and edited as needed      Meds/Allergies     Current Outpatient Medications   Medication Sig Dispense Refill    cholecalciferol (VITAMIN D) 400 units/mL Take 400 Units by mouth daily      COD LIVER OIL W/VIT A & D PO Take 1 capsule by mouth in the morning      Diclofenac Sodium (VOLTAREN) 1 % Apply 2 g topically 4 (four) times a day 100 g 3    hydrochlorothiazide (HYDRODIURIL) 12 5 mg tablet Take 1 tablet (12 5 mg total) by mouth daily 90 tablet 0    hydrOXYzine HCL (ATARAX) 25 mg tablet hydroxyzine HCl 25 mg tablet   TAKE 1 TABLET BY MOUTH EVERY 6 HOURS AS NEEDED FOR ITCHING      Multiple Minerals-Vitamins (CALCIUM-MAGNESIUM-ZINC-D3 PO) Take 1 tablet by mouth in the morning      olopatadine HCl (PATADAY) 0 2 % opth drops olopatadine 0 2 % eye drops   INSTILL 1 DROP INTO AFFECTED EYE(S) ONCE DAILY      pantoprazole (PROTONIX) 20 mg tablet Take 1 tablet (20 mg total) by mouth daily 90 tablet 3    Probiotic Product (PROBIOTIC BLEND PO) Take 1 capsule by mouth in the morning SYNBIOTIC      traMADol (Ultram) 50 mg tablet Take 1 tablet (50 mg total) by mouth every 6 (six) hours as needed for moderate pain 30 tablet 0 No current facility-administered medications for this visit  Allergies   Allergen Reactions    Influenza Virus Vaccine Swelling    Doxycycline Rash       PAST HISTORY    Past Medical History:   Diagnosis Date    Abnormal electrocardiogram     last assessed 1/8/16    Chronic kidney disease     GERD (gastroesophageal reflux disease)     Kidney stone     Primary osteoarthritis involving multiple joints 11/7/2019       Past Surgical History:   Procedure Laterality Date    EYE SURGERY      TONSILLECTOMY      TOOTH EXTRACTION      TUBAL LIGATION         Social History     Tobacco Use    Smoking status: Former Smoker    Smokeless tobacco: Never Used   Vaping Use    Vaping Use: Never used   Substance Use Topics    Alcohol use: Yes     Comment: occasional    Drug use: No       Family History   Problem Relation Age of Onset    Other Mother         sheehans syndrome    Aneurysm Father     Nephrolithiasis Father     Hypertension Sister     Cirrhosis Brother         alcoholic liver    No Known Problems Daughter     No Known Problems Sister     No Known Problems Daughter     No Known Problems Daughter     Other Daughter     No Known Problems Maternal Aunt     No Known Problems Maternal Grandmother     No Known Problems Paternal Grandmother          Above history personally reviewed  EXAM    Vitals:Blood pressure 128/76, pulse 76, temperature 97 6 °F (36 4 °C), temperature source Temporal, height 4' 11 5" (1 511 m), weight 72 1 kg (159 lb), SpO2 99 %  ,Body mass index is 31 58 kg/m²  Physical Exam  Constitutional:       Appearance: She is well-developed  She is obese  HENT:      Head: Normocephalic and atraumatic  Eyes:      Extraocular Movements: EOM normal       Pupils: Pupils are equal, round, and reactive to light  Pulmonary:      Effort: Pulmonary effort is normal    Abdominal:      Palpations: Abdomen is soft  Musculoskeletal:         General: Normal range of motion  Cervical back: Normal range of motion and neck supple  Skin:     General: Skin is warm and dry  Neurological:      Mental Status: She is alert and oriented to person, place, and time  Cranial Nerves: No cranial nerve deficit  Sensory: No sensory deficit  Motor: Weakness present  Coordination: Coordination normal  Finger-Nose-Finger Test normal       Gait: Gait normal       Deep Tendon Reflexes: Reflexes normal       Reflex Scores:       Patellar reflexes are 2+ on the right side and 2+ on the left side  Achilles reflexes are 2+ on the right side and 2+ on the left side  Psychiatric:         Speech: Speech normal          Neurologic Exam     Mental Status   Oriented to person, place, and time  Oriented to person  Oriented to place  Oriented to time  Oriented to year, month and date  Registration: recalls 3 of 3 objects  Attention: normal  Concentration: normal    Speech: speech is normal   Level of consciousness: alert  Knowledge: good and consistent with education  Able to name object  Cranial Nerves     CN III, IV, VI   Pupils are equal, round, and reactive to light  Extraocular motions are normal    Right pupil: Size: 3 mm  Shape: regular  Reactivity: brisk  Consensual response: intact  Accommodation: intact  Left pupil: Size: 3 mm  Shape: regular  Reactivity: brisk  Consensual response: intact  Accommodation: intact  Nystagmus: none   Diplopia: none  Conjugate gaze: present    CN V   Right facial sensation deficit: none  Left facial sensation deficit: none    CN VII   Facial expression full, symmetric       CN VIII   Hearing: intact    CN IX, X   Palate: symmetric    CN XI   Right sternocleidomastoid strength: normal  Left sternocleidomastoid strength: normal  Right trapezius strength: normal  Left trapezius strength: normal    CN XII   Tongue: not atrophic  Fasciculations: absent  Tongue deviation: none    Motor Exam   Muscle bulk: normal  Overall muscle tone: normal  Right arm pronator drift: absent  Left arm pronator drift: absent    Strength   Right deltoid: 5/5  Left deltoid: 5/5  Right biceps: 5/5  Left biceps: 5/5  Right triceps: 5/5  Left triceps: 5/5  Right iliopsoas: 5/5  Left iliopsoas: 4/5  Right quadriceps: 5/5  Left quadriceps: 4/5  Right hamstrin/5  Left hamstrin/5  Right glutei: 5/5  Left glutei: 4/5  Right anterior tibial: 5/5  Left anterior tibial: 5/5  Right posterior tibial: 5/5  Left posterior tibial: 5/5  Right peroneal: 5/5  Left peroneal: 5/5  Right gastroc: 5/5  Left gastroc: 5/5    Sensory Exam   Light touch normal    Proprioception normal    Occasional numbness from left calf to foot  Gait, Coordination, and Reflexes     Coordination   Finger to nose coordination: normal    Tremor   Resting tremor: absent  Intention tremor: absent  Action tremor: absent    Reflexes   Right patellar: 2+  Left patellar: 2+  Right achilles: 2+  Left achilles: 2+  Right Wright: absent  Left Wright: absent  Right ankle clonus: absent  Left ankle clonus: absent        MEDICAL DECISION MAKING    Imaging Studies:     No results found  I have personally reviewed pertinent reports     and I have personally reviewed pertinent films in PACS

## 2022-10-31 ENCOUNTER — TELEPHONE (OUTPATIENT)
Dept: OTHER | Facility: OTHER | Age: 77
End: 2022-10-31

## 2022-11-01 ENCOUNTER — TELEPHONE (OUTPATIENT)
Dept: INTERNAL MEDICINE CLINIC | Facility: CLINIC | Age: 77
End: 2022-11-01

## 2022-11-01 ENCOUNTER — TELEMEDICINE (OUTPATIENT)
Dept: INTERNAL MEDICINE CLINIC | Facility: CLINIC | Age: 77
End: 2022-11-01

## 2022-11-01 VITALS — OXYGEN SATURATION: 98 %

## 2022-11-01 DIAGNOSIS — U07.1 COVID-19: Primary | ICD-10-CM

## 2022-11-01 RX ORDER — BENZONATATE 200 MG/1
200 CAPSULE ORAL 3 TIMES DAILY PRN
Qty: 20 CAPSULE | Refills: 0 | Status: SHIPPED | OUTPATIENT
Start: 2022-11-01

## 2022-11-01 NOTE — TELEPHONE ENCOUNTER
Pt calling in need of a new prescription, took at home test for COVID, came back positive  Pt did not want to speak with a nurse tonight, would like the office to call her back tomorrow

## 2022-11-01 NOTE — PROGRESS NOTES
COVID-19 Outpatient Progress Note    Assessment/Plan:    Problem List Items Addressed This Visit    None     Visit Diagnoses     COVID-19    -  Primary    Relevant Medications    benzonatate (TESSALON) 200 MG capsule         Disposition:     Pt is beyond time frame to start paxlovid  Supportive treatment strategies discussed  Recheck 2 days    I have spent 15 minutes directly with the patient  Greater than 50% of this time was spent in counseling/coordination of care regarding: prognosis, risks and benefits of treatment options, instructions for management, patient and family education, importance of treatment compliance, risk factor reductions and impressions  Encounter provider: Zeb Leija PA-C     Provider located at: [address] [place] [phone]     Recent Visits  No visits were found meeting these conditions  Showing recent visits within past 7 days and meeting all other requirements  Today's Visits  Date Type Provider Dept   11/01/22 Telemedicine Zeb Leija PA-C [facility]   11/01/22 Telephone [address] [facility] today's visits and meeting all other requirements  Future Appointments  No visits were found meeting these conditions  Showing future appointments within next 150 days and meeting all other requirements     This virtual check-in was done via telephone and she agrees to proceed  Patient agrees to participate in a virtual check in via telephone or video visit instead of presenting to the office to address urgent/immediate medical needs  Patient is aware this is a billable service  She acknowledged consent and understanding of privacy and security of the video platform  The patient has agreed to participate and understands they can discontinue the visit at any time  After connecting through Telephone, the patient was identified by name and date of birth   Aram Shone was informed that this was a telemedicine visit and that the exam was being conducted confidentially over secure lines  Raghav Melissa acknowledged consent and understanding of privacy and security of the telemedicine visit  I informed the patient that I have reviewed her record in Epic and presented the opportunity for her to ask any questions regarding the visit today  The patient agreed to participate  It was my intent to perform this visit via video technology but the patient was not able to do a video connection so the visit was completed via audio telephone only  Verification of patient location:  Patient is located in the following state in which I hold an active license: [place]    Subjective:   Raghav Melissa is a 68 y o  female who has been screened for COVID-19  Symptom change since last report: unchanged  Patient's symptoms include nasal congestion, rhinorrhea, sore throat, cough, chest tightness and headache  Patient denies fever, chills, anosmia, loss of taste, shortness of breath, abdominal pain, nausea, vomiting, diarrhea and myalgias  - Date of symptom onset: 10/26/2022  - Date of positive COVID-19 test: 11/1/2022  Type of test: Home antigen  COVID-19 vaccination status: Not vaccinated    Salma Mcwilliams has been staying home and has isolated themselves in her home  She is taking care to not share personal items and is cleaning all surfaces that are touched often, like counters, tabletops, and doorknobs using household cleaning sprays or wipes  She is wearing a mask when she leaves her room  Pt given isolation protocol and verbalized understanding of this  Pt may start taking Vit D, Vit C and Zinc  Pt to do deep breathing exercises on an hourly basis to prevent pneumonia complications  Pt also to ambulate frequently to avoid clotting complications         Lab Results   Component Value Date    SARSCOV2 Positive (A) 01/18/2022    SARSCOV2 Not Detected 12/14/2020       Review of Systems Constitutional: Negative for chills and fever  HENT: Positive for congestion, rhinorrhea and sore throat  Negative for ear pain, hearing loss, postnasal drip, sinus pressure, sinus pain and trouble swallowing  Eyes: Negative for pain and visual disturbance  Respiratory: Positive for cough and chest tightness  Negative for shortness of breath and wheezing  Cardiovascular: Negative  Negative for chest pain, palpitations and leg swelling  Gastrointestinal: Negative for abdominal pain, blood in stool, constipation, diarrhea, nausea and vomiting  Endocrine: Negative for cold intolerance, heat intolerance, polydipsia, polyphagia and polyuria  Genitourinary: Negative for difficulty urinating, dysuria, flank pain and urgency  Musculoskeletal: Negative for arthralgias, back pain, gait problem and myalgias  Skin: Negative for rash  Allergic/Immunologic: Negative  Neurological: Positive for headaches  Negative for dizziness, weakness and light-headedness  Hematological: Negative  Psychiatric/Behavioral: Negative for behavioral problems, dysphoric mood and sleep disturbance  The patient is not nervous/anxious        Current Outpatient Medications on File Prior to Visit   Medication Sig   • cholecalciferol (VITAMIN D) 400 units/mL Take 400 Units by mouth daily   • COD LIVER OIL W/VIT A & D PO Take 1 capsule by mouth in the morning   • Diclofenac Sodium (VOLTAREN) 1 % Apply 2 g topically 4 (four) times a day   • hydrochlorothiazide (HYDRODIURIL) 12 5 mg tablet Take 1 tablet (12 5 mg total) by mouth daily   • hydrOXYzine HCL (ATARAX) 25 mg tablet hydroxyzine HCl 25 mg tablet   TAKE 1 TABLET BY MOUTH EVERY 6 HOURS AS NEEDED FOR ITCHING   • Multiple Minerals-Vitamins (CALCIUM-MAGNESIUM-ZINC-D3 PO) Take 1 tablet by mouth in the morning   • olopatadine HCl (PATADAY) 0 2 % opth drops olopatadine 0 2 % eye drops   INSTILL 1 DROP INTO AFFECTED EYE(S) ONCE DAILY   • pantoprazole (PROTONIX) 20 mg tablet Take 1 tablet (20 mg total) by mouth daily   • Probiotic Product (PROBIOTIC BLEND PO) Take 1 capsule by mouth in the morning SYNBIOTIC   • traMADol (Ultram) 50 mg tablet Take 1 tablet (50 mg total) by mouth every 6 (six) hours as needed for moderate pain       Objective:    SpO2 98%      Physical Exam  Lisa Spence PA-C

## 2022-11-01 NOTE — TELEPHONE ENCOUNTER
Pt called states she was taking care of her son who has Covid  Yesterday she took home test came back positive  Symptoms are dry cough, runny and stuff nose, slight HA,  breathing a little labored, still has taste, no fever  She is not Covid vac and wants the "Covid medicine" sent into the [facility] pharmacy

## 2022-11-01 NOTE — TELEPHONE ENCOUNTER
Pt called she tested + yesterday her symptoms started Sunday runny nose congestion headache cough  She is not vaccinated

## 2022-11-03 ENCOUNTER — TELEMEDICINE (OUTPATIENT)
Dept: INTERNAL MEDICINE CLINIC | Facility: CLINIC | Age: 77
End: 2022-11-03

## 2022-11-03 VITALS — OXYGEN SATURATION: 98 % | HEART RATE: 79 BPM

## 2022-11-03 DIAGNOSIS — U07.1 COVID-19: Primary | ICD-10-CM

## 2022-11-03 NOTE — PROGRESS NOTES
COVID-19 Outpatient Progress Note    Assessment/Plan:    Problem List Items Addressed This Visit    None     Visit Diagnoses     COVID-19    -  Primary         Disposition:     Patient has asymptomatic or mild COVID-19 infection  Based off CDC guidelines, they were recommended to isolate for 5 days  If they are asymptomatic or symptoms are improving with no fevers in the past 24 hours, isolation may be ended followed by 5 days of wearing a mask when around othes to minimize risk of infecting others  If still have a fever or other symptoms have not improved, continue to isolate until they improve  Regardless of when they end isolation, avoid being around people who are more likely to get very sick from COVID-19 until at least day 11  I have spent 15 minutes directly with the patient  Greater than 50% of this time was spent in counseling/coordination of care regarding: prognosis, risks and benefits of treatment options, instructions for management, patient and family education, importance of treatment compliance, risk factor reductions and impressions  Encounter provider: Sangeetha Christianson PA-C     Provider located at: [address] [place] [phone]     Recent Visits  Date Type Provider Dept   [facility]   11/01/22 Telephone Ej Polo recent visits within past 7 days and meeting all other requirements  Today's Visits  Date Type Provider Dept   11/03/22 Telemedicine KYLIE Swanson [facility] today's visits and meeting all other requirements  Future Appointments  No visits were found meeting these conditions  Showing future appointments within next 150 days and meeting all other requirements     This virtual check-in was done via telephone and she agrees to proceed      Patient agrees to participate in a virtual check in via telephone or video visit instead of presenting to the office to address urgent/immediate medical needs  Patient is aware this is a billable service  She acknowledged consent and understanding of privacy and security of the video platform  The patient has agreed to participate and understands they can discontinue the visit at any time  After connecting through Telephone, the patient was identified by name and date of birth  Abe Perry was informed that this was a telemedicine visit and that the exam was being conducted confidentially over secure lines  Abe Perry acknowledged consent and understanding of privacy and security of the telemedicine visit  I informed the patient that I have reviewed her record in Epic and presented the opportunity for her to ask any questions regarding the visit today  The patient agreed to participate  Verification of patient location:  Patient is located in the following state in which I hold an active license: [place]    Subjective:   Abe Perry is a 68 y o  female who has been screened for COVID-19  Symptom change since last report: improving  Patient's symptoms include nasal congestion, rhinorrhea and cough  Patient denies fever, chills, fatigue, malaise, sore throat, anosmia, loss of taste, shortness of breath, chest tightness, nausea, diarrhea, myalgias and headaches  - Date of symptom onset: 10/26/2022      COVID-19 vaccination status: Fully vaccinated (primary series)    Raymond Gore has been staying home and has isolated themselves in her home  She is taking care to not share personal items and is cleaning all surfaces that are touched often, like counters, tabletops, and doorknobs using household cleaning sprays or wipes  She is wearing a mask when she leaves her room  Pt is noting slight improvement in her symptoms since our last virtual visit  No new symptoms  Remains afebrile   O2 is normal at 98%    Lab Results   Component Value Date    SARSCOV2 Positive (A) 01/18/2022    SARSCOV2 Not Detected 12/14/2020       Review of Systems   Constitutional: Negative for chills, fatigue and fever  HENT: Positive for congestion and rhinorrhea  Negative for sore throat  Respiratory: Positive for cough  Negative for chest tightness and shortness of breath  Gastrointestinal: Negative for diarrhea and nausea  Musculoskeletal: Negative for myalgias  Neurological: Negative for headaches       Current Outpatient Medications on File Prior to Visit   Medication Sig   • benzonatate (TESSALON) 200 MG capsule Take 1 capsule (200 mg total) by mouth 3 (three) times a day as needed for cough   • cholecalciferol (VITAMIN D) 400 units/mL Take 400 Units by mouth daily   • COD LIVER OIL W/VIT A & D PO Take 1 capsule by mouth in the morning   • Diclofenac Sodium (VOLTAREN) 1 % Apply 2 g topically 4 (four) times a day   • hydrochlorothiazide (HYDRODIURIL) 12 5 mg tablet Take 1 tablet (12 5 mg total) by mouth daily   • hydrOXYzine HCL (ATARAX) 25 mg tablet hydroxyzine HCl 25 mg tablet   TAKE 1 TABLET BY MOUTH EVERY 6 HOURS AS NEEDED FOR ITCHING   • Multiple Minerals-Vitamins (CALCIUM-MAGNESIUM-ZINC-D3 PO) Take 1 tablet by mouth in the morning   • olopatadine HCl (PATADAY) 0 2 % opth drops olopatadine 0 2 % eye drops   INSTILL 1 DROP INTO AFFECTED EYE(S) ONCE DAILY   • pantoprazole (PROTONIX) 20 mg tablet Take 1 tablet (20 mg total) by mouth daily   • Probiotic Product (PROBIOTIC BLEND PO) Take 1 capsule by mouth in the morning SYNBIOTIC   • traMADol (Ultram) 50 mg tablet Take 1 tablet (50 mg total) by mouth every 6 (six) hours as needed for moderate pain       Objective:    Pulse 79   SpO2 98%      Physical Exam  Lisa Spence PA-C

## 2023-01-12 DIAGNOSIS — M54.50 CHRONIC LOW BACK PAIN, UNSPECIFIED BACK PAIN LATERALITY, UNSPECIFIED WHETHER SCIATICA PRESENT: ICD-10-CM

## 2023-01-12 DIAGNOSIS — G89.29 CHRONIC LOW BACK PAIN, UNSPECIFIED BACK PAIN LATERALITY, UNSPECIFIED WHETHER SCIATICA PRESENT: ICD-10-CM

## 2023-01-12 DIAGNOSIS — M15.9 PRIMARY OSTEOARTHRITIS INVOLVING MULTIPLE JOINTS: ICD-10-CM

## 2023-01-12 RX ORDER — TRAMADOL HYDROCHLORIDE 50 MG/1
50 TABLET ORAL EVERY 6 HOURS PRN
Qty: 30 TABLET | Refills: 0 | Status: SHIPPED | OUTPATIENT
Start: 2023-01-12

## 2023-03-01 ENCOUNTER — NURSE TRIAGE (OUTPATIENT)
Dept: OTHER | Facility: OTHER | Age: 78
End: 2023-03-01

## 2023-03-01 ENCOUNTER — TELEMEDICINE (OUTPATIENT)
Dept: INTERNAL MEDICINE CLINIC | Facility: CLINIC | Age: 78
End: 2023-03-01

## 2023-03-01 DIAGNOSIS — M54.50 CHRONIC LOW BACK PAIN, UNSPECIFIED BACK PAIN LATERALITY, UNSPECIFIED WHETHER SCIATICA PRESENT: ICD-10-CM

## 2023-03-01 DIAGNOSIS — R07.81 RIB PAIN: ICD-10-CM

## 2023-03-01 DIAGNOSIS — G89.29 CHRONIC LOW BACK PAIN, UNSPECIFIED BACK PAIN LATERALITY, UNSPECIFIED WHETHER SCIATICA PRESENT: ICD-10-CM

## 2023-03-01 DIAGNOSIS — J18.9 PNEUMONIA OF BOTH LUNGS DUE TO INFECTIOUS ORGANISM, UNSPECIFIED PART OF LUNG: ICD-10-CM

## 2023-03-01 DIAGNOSIS — M15.9 PRIMARY OSTEOARTHRITIS INVOLVING MULTIPLE JOINTS: ICD-10-CM

## 2023-03-01 DIAGNOSIS — J06.9 ACUTE URI: Primary | ICD-10-CM

## 2023-03-01 RX ORDER — TRAMADOL HYDROCHLORIDE 50 MG/1
50 TABLET ORAL EVERY 6 HOURS PRN
Qty: 30 TABLET | Refills: 0 | Status: SHIPPED | OUTPATIENT
Start: 2023-03-01

## 2023-03-01 RX ORDER — AZITHROMYCIN 250 MG/1
TABLET, FILM COATED ORAL
Qty: 6 TABLET | Refills: 0 | Status: SHIPPED | OUTPATIENT
Start: 2023-03-01 | End: 2023-03-06

## 2023-03-01 RX ORDER — GUAIFENESIN 600 MG/1
600 TABLET, EXTENDED RELEASE ORAL EVERY 12 HOURS SCHEDULED
Qty: 60 TABLET | Refills: 0 | Status: SHIPPED | OUTPATIENT
Start: 2023-03-01

## 2023-03-01 RX ORDER — CYCLOBENZAPRINE HCL 5 MG
5 TABLET ORAL 3 TIMES DAILY PRN
Qty: 60 TABLET | Refills: 0 | Status: SHIPPED | OUTPATIENT
Start: 2023-03-01

## 2023-03-01 NOTE — PATIENT INSTRUCTIONS
Problem List Items Addressed This Visit          Respiratory    Acute URI - Primary     cough and body aches x's 4 days   Ribs hurt due to coughing no fever   Nose is congested,  not a chest cold  She does have a hoarse voice from coughing  Denies any real post nasal drip   Will start her on a Z-pack, flexeril, mucinex  I refilled her tramadol  I suggest a CXR if she does not feel better in the next 2-3 days to rule out pneumonia         Relevant Medications    traMADol (Ultram) 50 mg tablet    cyclobenzaprine (FLEXERIL) 5 mg tablet    azithromycin (Zithromax) 250 mg tablet    guaiFENesin (MUCINEX) 600 mg 12 hr tablet    Other Relevant Orders    XR chest pa & lateral    Bilateral pneumonia    Relevant Medications    guaiFENesin (MUCINEX) 600 mg 12 hr tablet       Musculoskeletal and Integument    Primary osteoarthritis involving multiple joints    Relevant Medications    traMADol (Ultram) 50 mg tablet    cyclobenzaprine (FLEXERIL) 5 mg tablet    azithromycin (Zithromax) 250 mg tablet    guaiFENesin (MUCINEX) 600 mg 12 hr tablet    Other Relevant Orders    XR chest pa & lateral       Other    Chronic lower back pain    Relevant Medications    traMADol (Ultram) 50 mg tablet    cyclobenzaprine (FLEXERIL) 5 mg tablet    azithromycin (Zithromax) 250 mg tablet    guaiFENesin (MUCINEX) 600 mg 12 hr tablet    Other Relevant Orders    XR chest pa & lateral    Rib pain     Rib pain from coughing  Feels that her stomach is more upset than anything else  She is using a pillow to help brace her abdomen and ribs when she coughs  Has tried cough medication, and advill  Tramadol does work for her  Will start her on a Z-pack, flexeril, mucinex  I refilled her tramadol  I suggest a CXR if she does not feel better in the next 2-3 days to rule out pneumonia         Relevant Medications    traMADol (Ultram) 50 mg tablet    cyclobenzaprine (FLEXERIL) 5 mg tablet    azithromycin (Zithromax) 250 mg tablet    guaiFENesin (MUCINEX) 600 mg 12 hr tablet    Other Relevant Orders    XR chest pa & lateral

## 2023-03-01 NOTE — TELEPHONE ENCOUNTER
Patient called in stating she has body aches, nasal congestion and cough   States rib pain from the constant cough  Denies fever or other symptoms  Negative for covid  States possible flu  Was taking  abx  Advised on the importance of not takin  medication and if its the flu abx will not alleviate symptoms  Provided care advice and scheduled  Virtual at 1040 today  Denies difficulty breathing or swallowing  Reason for Disposition  • [1] Continuous (nonstop) coughing interferes with work or school AND [2] no improvement using cough treatment per Care Advice    Additional Information  • [1] Cough with sputum AND [2] no fever    Answer Assessment - Initial Assessment Questions  1  WORST SYMPTOM: "What is your worst symptom?" (e g , cough, runny nose, muscle aches, headache, sore throat, fever)       Cough , - hurts my ribs   2  ONSET: "When did your flu symptoms start?"    5 days ago   3  COUGH: "How bad is the cough?"       Yes   4  RESPIRATORY DISTRESS: "Describe your breathing "       Denies   5  FEVER: "Do you have a fever?" If Yes, ask: "What is your temperature, how was it measured, and when did it start?"     Denies   6  EXPOSURE: "Were you exposed to someone with influenza?"      Denies   7  FLU VACCINE: "Did you get a flu shot this year?"  denies  8  HIGH RISK DISEASE: "Do you any chronic medical problems?" (e g , heart or lung disease, asthma, weak immune system, or other HIGH RISK conditions)denies       Denies     10   OTHER SYMPTOMS: "Do you have any other symptoms?"  (e g , runny nose, muscle aches, headache, sore throat)body aches , chest and nasal congestion    Tea with honey , drinking fluids, cough drops    Protocols used: COUGH - ACUTE PRODUCTIVE-ADULT-, INFLUENZA - SEASONAL-ADULT-AH

## 2023-03-01 NOTE — ASSESSMENT & PLAN NOTE
· Rib pain from coughing  · Feels that her stomach is more upset than anything else  · She is using a pillow to help brace her abdomen and ribs when she coughs  · Has tried cough medication, and advill  · Tramadol does work for her  · Will start her on a Z-pack, flexeril, mucinex  · I refilled her tramadol  · I suggest a CXR if she does not feel better in the next 2-3 days to rule out pneumonia

## 2023-03-01 NOTE — PROGRESS NOTES
Virtual Regular Visit    Verification of patient location:    Patient is located in the following state in which I hold an active license [place]      Assessment/Plan:    Problem List Items Addressed This Visit        Respiratory    Acute URI - Primary     · cough and body aches x's 4 days   · Ribs hurt due to coughing no fever   · Nose is congested,  not a chest cold  · She does have a hoarse voice from coughing  · Denies any real post nasal drip   · Will start her on a Z-pack, flexeril, mucinex  · I refilled her tramadol  · I suggest a CXR if she does not feel better in the next 2-3 days to rule out pneumonia         Relevant Medications    traMADol (Ultram) 50 mg tablet    cyclobenzaprine (FLEXERIL) 5 mg tablet    azithromycin (Zithromax) 250 mg tablet    guaiFENesin (MUCINEX) 600 mg 12 hr tablet    Other Relevant Orders    XR chest pa & lateral    Bilateral pneumonia    Relevant Medications    guaiFENesin (MUCINEX) 600 mg 12 hr tablet       Musculoskeletal and Integument    Primary osteoarthritis involving multiple joints    Relevant Medications    traMADol (Ultram) 50 mg tablet    cyclobenzaprine (FLEXERIL) 5 mg tablet    azithromycin (Zithromax) 250 mg tablet    guaiFENesin (MUCINEX) 600 mg 12 hr tablet    Other Relevant Orders    XR chest pa & lateral       Other    Chronic lower back pain    Relevant Medications    traMADol (Ultram) 50 mg tablet    cyclobenzaprine (FLEXERIL) 5 mg tablet    azithromycin (Zithromax) 250 mg tablet    guaiFENesin (MUCINEX) 600 mg 12 hr tablet    Other Relevant Orders    XR chest pa & lateral    Rib pain     · Rib pain from coughing  · Feels that her stomach is more upset than anything else  · She is using a pillow to help brace her abdomen and ribs when she coughs  · Has tried cough medication, and advill  · Tramadol does work for her  · Will start her on a Z-pack, flexeril, mucinex  · I refilled her tramadol  · I suggest a CXR if she does not feel better in the next 2-3 days to rule out pneumonia         Relevant Medications    traMADol (Ultram) 50 mg tablet    cyclobenzaprine (FLEXERIL) 5 mg tablet    azithromycin (Zithromax) 250 mg tablet    guaiFENesin (MUCINEX) 600 mg 12 hr tablet    Other Relevant Orders    XR chest pa & lateral            Reason for visit is   Chief Complaint   Patient presents with   • Follow-up     [phone]  cough and body aches x's 4 days  Ribs hurt due to coughing no fever   Nose is congested,  not a chest cold          Encounter provider REGINA Carver    Provider located at [address]  [address] [place] [id number]      Recent Visits  No visits were found meeting these conditions  Showing recent visits within past 7 days and meeting all other requirements  Today's Visits  Date Type Provider Dept   03/01/23 Telemedicine REGINA Barnhart Pg today's visits and meeting all other requirements  Future Appointments  No visits were found meeting these conditions  Showing future appointments within next 150 days and meeting all other requirements       The patient was identified by name and date of birth  Eliana Graham was informed that this is a telemedicine visit and that the visit is being conducted through Telephone  My office door was closed  No one else was in the room  She acknowledged consent and understanding of privacy and security of the video platform  The patient has agreed to participate and understands they can discontinue the visit at any time  Patient is aware this is a billable service  Subjective  Eliana Graham is a 68 y o  female  The patient is here today to discuss her rib pains and her cough  Please continue to the [facility] section of the note for details of today's visit           Past Medical History:   Diagnosis Date   • Abnormal electrocardiogram     last assessed 1/8/16   • Chronic kidney disease    • GERD (gastroesophageal reflux disease)    • Kidney stone    • Primary osteoarthritis involving multiple joints 11/7/2019       Past Surgical History:   Procedure Laterality Date   • EYE SURGERY     • TONSILLECTOMY     • TOOTH EXTRACTION     • TUBAL LIGATION         Current Outpatient Medications   Medication Sig Dispense Refill   • azithromycin (Zithromax) 250 mg tablet Take 2 tablets (500 mg total) by mouth daily for 1 day, THEN 1 tablet (250 mg total) daily for 4 days  6 tablet 0   • cholecalciferol (VITAMIN D) 400 units/mL Take 400 Units by mouth daily     • COD LIVER OIL W/VIT A & D PO Take 1 capsule by mouth in the morning     • cyclobenzaprine (FLEXERIL) 5 mg tablet Take 1 tablet (5 mg total) by mouth 3 (three) times a day as needed for muscle spasms 60 tablet 0   • Diclofenac Sodium (VOLTAREN) 1 % Apply 2 g topically 4 (four) times a day 100 g 3   • guaiFENesin (MUCINEX) 600 mg 12 hr tablet Take 1 tablet (600 mg total) by mouth every 12 (twelve) hours 60 tablet 0   • hydrochlorothiazide (HYDRODIURIL) 12 5 mg tablet Take 1 tablet (12 5 mg total) by mouth daily 90 tablet 0   • hydrOXYzine HCL (ATARAX) 25 mg tablet hydroxyzine HCl 25 mg tablet   TAKE 1 TABLET BY MOUTH EVERY 6 HOURS AS NEEDED FOR ITCHING     • Multiple Minerals-Vitamins (CALCIUM-MAGNESIUM-ZINC-D3 PO) Take 1 tablet by mouth in the morning     • olopatadine HCl (PATADAY) 0 2 % opth drops olopatadine 0 2 % eye drops   INSTILL 1 DROP INTO AFFECTED EYE(S) ONCE DAILY     • pantoprazole (PROTONIX) 20 mg tablet Take 1 tablet (20 mg total) by mouth daily 90 tablet 3   • Probiotic Product (PROBIOTIC BLEND PO) Take 1 capsule by mouth in the morning SYNBIOTIC     • traMADol (Ultram) 50 mg tablet Take 1 tablet (50 mg total) by mouth every 6 (six) hours as needed for moderate pain 30 tablet 0     No current facility-administered medications for this visit          Allergies   Allergen Reactions   • Influenza Virus Vaccine Swelling   • Doxycycline Rash       Review of Systems   HENT: Positive for congestion and postnasal drip  Respiratory: Positive for cough  Gastrointestinal: Positive for abdominal pain  Musculoskeletal:        Rib pain bilaterally       Video Exam    Vitals:       Physical Exam  Constitutional:       Comments: Sinus congestion   Pulmonary:      Comments: Non productive cough  Chest:              I spent 15 minutes with patient today in which greater than 50% of the time was spent in counseling/coordination of care regarding medication, treatment plan and follow up care

## 2023-03-01 NOTE — ASSESSMENT & PLAN NOTE
· cough and body aches x's 4 days   · Ribs hurt due to coughing no fever   · Nose is congested,  not a chest cold  · She does have a hoarse voice from coughing  · Denies any real post nasal drip   · Will start her on a Z-pack, flexeril, mucinex  · I refilled her tramadol  · I suggest a CXR if she does not feel better in the next 2-3 days to rule out pneumonia

## 2023-03-02 ENCOUNTER — TELEPHONE (OUTPATIENT)
Dept: INTERNAL MEDICINE CLINIC | Facility: CLINIC | Age: 78
End: 2023-03-02

## 2023-03-06 ENCOUNTER — APPOINTMENT (OUTPATIENT)
Dept: RADIOLOGY | Facility: CLINIC | Age: 78
End: 2023-03-06

## 2023-03-06 DIAGNOSIS — M54.50 CHRONIC LOW BACK PAIN, UNSPECIFIED BACK PAIN LATERALITY, UNSPECIFIED WHETHER SCIATICA PRESENT: ICD-10-CM

## 2023-03-06 DIAGNOSIS — R07.81 RIB PAIN: ICD-10-CM

## 2023-03-06 DIAGNOSIS — J06.9 ACUTE URI: ICD-10-CM

## 2023-03-06 DIAGNOSIS — M15.9 PRIMARY OSTEOARTHRITIS INVOLVING MULTIPLE JOINTS: ICD-10-CM

## 2023-03-06 DIAGNOSIS — G89.29 CHRONIC LOW BACK PAIN, UNSPECIFIED BACK PAIN LATERALITY, UNSPECIFIED WHETHER SCIATICA PRESENT: ICD-10-CM

## 2023-03-30 ENCOUNTER — OFFICE VISIT (OUTPATIENT)
Dept: INTERNAL MEDICINE CLINIC | Facility: CLINIC | Age: 78
End: 2023-03-30

## 2023-03-30 VITALS
TEMPERATURE: 98.4 F | HEART RATE: 92 BPM | DIASTOLIC BLOOD PRESSURE: 76 MMHG | SYSTOLIC BLOOD PRESSURE: 112 MMHG | OXYGEN SATURATION: 97 % | WEIGHT: 170.2 LBS | BODY MASS INDEX: 33.8 KG/M2

## 2023-03-30 DIAGNOSIS — B37.31 VAGINAL CANDIDIASIS: ICD-10-CM

## 2023-03-30 DIAGNOSIS — F11.20 CONTINUOUS OPIOID DEPENDENCE (HCC): ICD-10-CM

## 2023-03-30 DIAGNOSIS — K14.8 TONGUE EDEMA: Primary | ICD-10-CM

## 2023-03-30 RX ORDER — NYSTATIN AND TRIAMCINOLONE ACETONIDE 100000; 1 [USP'U]/G; MG/G
1 OINTMENT TOPICAL 2 TIMES DAILY
Qty: 60 G | Refills: 3 | Status: SHIPPED | OUTPATIENT
Start: 2023-03-30

## 2023-03-30 RX ORDER — NYSTATIN AND TRIAMCINOLONE ACETONIDE 100000; 1 [USP'U]/G; MG/G
1 OINTMENT TOPICAL 2 TIMES DAILY
COMMUNITY
End: 2023-03-30 | Stop reason: SDUPTHER

## 2023-03-30 RX ORDER — METRONIDAZOLE 7.5 MG/G
1 GEL VAGINAL 2 TIMES DAILY
Qty: 70 G | Refills: 3 | Status: SHIPPED | OUTPATIENT
Start: 2023-03-30

## 2023-03-30 RX ORDER — CEPHALEXIN 500 MG/1
500 CAPSULE ORAL DAILY
Qty: 7 CAPSULE | Refills: 0 | Status: SHIPPED | OUTPATIENT
Start: 2023-03-30 | End: 2023-04-06

## 2023-03-30 NOTE — ASSESSMENT & PLAN NOTE
· Back of right side of tongue  · Swollen and red taste buds  · Will give her nystatin to swish and swallow  · Will also give her keflex once daily to help prevent infection

## 2023-03-30 NOTE — PROGRESS NOTES
Assessment/Plan:     Problem List Items Addressed This Visit        Digestive    Tongue edema - Primary     · Back of right side of tongue  · Swollen and red taste buds  · Will give her nystatin to swish and swallow  · Will also give her keflex once daily to help prevent infection         Relevant Medications    nystatin (MYCOSTATIN) 500,000 units/5 mL suspension    cephalexin (KEFLEX) 500 mg capsule       Other    Continuous opioid dependence (HCC)   Other Visit Diagnoses     Vaginal candidiasis        Relevant Medications    nystatin-triamcinolone (MYCOLOG-II) ointment    metroNIDAZOLE (METROGEL) 0 75 % vaginal gel          Subjective:      Patient ID: Saeed Shrestha is a 68 y o  female  The patient is here today to discuss the 'lumps' she found on the back right side of her tongue  Please continue to the [facility] section of the note for details of today's visit  The following portions of the patient's history were reviewed and updated as appropriate:     Past Medical History:  She has a past medical history of Abnormal electrocardiogram, Chronic kidney disease, GERD (gastroesophageal reflux disease), Kidney stone, and Primary osteoarthritis involving multiple joints (11/7/2019)  ,  _______________________________________________________________________  Medical Problems:  does not have any pertinent problems on file ,  _______________________________________________________________________  Past Surgical History:   has a past surgical history that includes Tubal ligation; Tonsillectomy; Tooth extraction; and Eye surgery  ,  _______________________________________________________________________  Family History:  family history includes Aneurysm in her father; Cirrhosis in her brother; Hypertension in her sister; Nephrolithiasis in her father; No Known Problems in her daughter, daughter, daughter, maternal aunt, maternal grandmother, paternal grandmother, and sister;  Other in her daughter and mother ,  _______________________________________________________________________  Social History:   reports that she has quit smoking  She has never used smokeless tobacco  She reports current alcohol use  She reports that she does not use drugs  ,  _______________________________________________________________________  Allergies:  is allergic to influenza virus vaccine and doxycycline     _______________________________________________________________________  Current Outpatient Medications   Medication Sig Dispense Refill   • cephalexin (KEFLEX) 500 mg capsule Take 1 capsule (500 mg total) by mouth in the morning for 7 days 7 capsule 0   • cholecalciferol (VITAMIN D) 400 units/mL Take 400 Units by mouth daily     • COD LIVER OIL W/VIT A & D PO Take 1 capsule by mouth in the morning     • cyclobenzaprine (FLEXERIL) 5 mg tablet Take 1 tablet (5 mg total) by mouth 3 (three) times a day as needed for muscle spasms 60 tablet 0   • Diclofenac Sodium (VOLTAREN) 1 % Apply 2 g topically 4 (four) times a day 100 g 3   • hydrochlorothiazide (HYDRODIURIL) 12 5 mg tablet Take 1 tablet (12 5 mg total) by mouth daily 90 tablet 0   • hydrOXYzine HCL (ATARAX) 25 mg tablet hydroxyzine HCl 25 mg tablet   TAKE 1 TABLET BY MOUTH EVERY 6 HOURS AS NEEDED FOR ITCHING     • metroNIDAZOLE (METROGEL) 0 75 % vaginal gel Insert 1 application  into the vagina 2 (two) times a day 70 g 3   • Multiple Minerals-Vitamins (CALCIUM-MAGNESIUM-ZINC-D3 PO) Take 1 tablet by mouth in the morning     • nystatin (MYCOSTATIN) 500,000 units/5 mL suspension Apply 5 mL (500,000 Units total) to the mouth or throat 4 (four) times a day 473 mL 1   • nystatin-triamcinolone (MYCOLOG-II) ointment Apply 1 application   topically 2 (two) times a day 60 g 3   • olopatadine HCl (PATADAY) 0 2 % opth drops olopatadine 0 2 % eye drops   INSTILL 1 DROP INTO AFFECTED EYE(S) ONCE DAILY     • pantoprazole (PROTONIX) 20 mg tablet Take 1 tablet (20 mg total) by mouth daily 90 tablet 3   • Probiotic Product (PROBIOTIC BLEND PO) Take 1 capsule by mouth in the morning SYNBIOTIC     • traMADol (Ultram) 50 mg tablet Take 1 tablet (50 mg total) by mouth every 6 (six) hours as needed for moderate pain 30 tablet 0   • guaiFENesin (MUCINEX) 600 mg 12 hr tablet Take 1 tablet (600 mg total) by mouth every 12 (twelve) hours (Patient not taking: Reported on 3/30/2023) 60 tablet 0     No current facility-administered medications for this visit      _______________________________________________________________________      Review of Systems   HENT:        Tongue pain         Objective:  Vitals:    03/30/23 1055   BP: 112/76   BP Location: Left arm   Patient Position: Sitting   Cuff Size: Standard   Pulse: 92   Temp: 98 4 °F (36 9 °C)   SpO2: 97%   Weight: 77 2 kg (170 lb 3 2 oz)     Body mass index is 33 8 kg/m²       Physical Exam  HENT:      Mouth/Throat:

## 2023-03-30 NOTE — PATIENT INSTRUCTIONS
Problem List Items Addressed This Visit          Digestive    Tongue edema - Primary     Back of right side of tongue  Swollen and red taste buds  Will give her nystatin to swish and swallow  Will also give her keflex once daily to help prevent infection         Relevant Medications    nystatin (MYCOSTATIN) 500,000 units/5 mL suspension    cephalexin (KEFLEX) 500 mg capsule       Other    Continuous opioid dependence (HCC)     Other Visit Diagnoses       Vaginal candidiasis        Relevant Medications    nystatin-triamcinolone (MYCOLOG-II) ointment    metroNIDAZOLE (METROGEL) 0 75 % vaginal gel

## 2023-04-30 PROBLEM — J18.9 BILATERAL PNEUMONIA: Status: RESOLVED | Noted: 2023-03-01 | Resolved: 2023-04-30

## 2023-04-30 PROBLEM — J06.9 ACUTE URI: Status: RESOLVED | Noted: 2023-03-01 | Resolved: 2023-04-30

## 2023-05-18 DIAGNOSIS — H04.123 CHRONIC DRYNESS OF BOTH EYES: Primary | ICD-10-CM

## 2023-05-18 RX ORDER — OLOPATADINE HYDROCHLORIDE 2 MG/ML
1 SOLUTION/ DROPS OPHTHALMIC DAILY
Qty: 10 ML | Refills: 1 | Status: SHIPPED | OUTPATIENT
Start: 2023-05-18

## 2023-05-31 ENCOUNTER — OFFICE VISIT (OUTPATIENT)
Dept: INTERNAL MEDICINE CLINIC | Facility: CLINIC | Age: 78
End: 2023-05-31

## 2023-05-31 VITALS
SYSTOLIC BLOOD PRESSURE: 104 MMHG | DIASTOLIC BLOOD PRESSURE: 62 MMHG | BODY MASS INDEX: 32.67 KG/M2 | HEIGHT: 60 IN | WEIGHT: 166.4 LBS | TEMPERATURE: 97.9 F | HEART RATE: 79 BPM | OXYGEN SATURATION: 96 %

## 2023-05-31 DIAGNOSIS — M15.9 PRIMARY OSTEOARTHRITIS INVOLVING MULTIPLE JOINTS: ICD-10-CM

## 2023-05-31 DIAGNOSIS — Z12.31 ENCOUNTER FOR SCREENING MAMMOGRAM FOR BREAST CANCER: ICD-10-CM

## 2023-05-31 DIAGNOSIS — M25.511 CHRONIC RIGHT SHOULDER PAIN: ICD-10-CM

## 2023-05-31 DIAGNOSIS — F11.20 CONTINUOUS OPIOID DEPENDENCE (HCC): ICD-10-CM

## 2023-05-31 DIAGNOSIS — M54.50 CHRONIC LOW BACK PAIN, UNSPECIFIED BACK PAIN LATERALITY, UNSPECIFIED WHETHER SCIATICA PRESENT: ICD-10-CM

## 2023-05-31 DIAGNOSIS — E55.9 VITAMIN D DEFICIENCY: ICD-10-CM

## 2023-05-31 DIAGNOSIS — H04.123 DRY EYE SYNDROME OF BOTH EYES: ICD-10-CM

## 2023-05-31 DIAGNOSIS — G89.29 CHRONIC LOW BACK PAIN, UNSPECIFIED BACK PAIN LATERALITY, UNSPECIFIED WHETHER SCIATICA PRESENT: ICD-10-CM

## 2023-05-31 DIAGNOSIS — E78.2 MIXED HYPERLIPIDEMIA: ICD-10-CM

## 2023-05-31 DIAGNOSIS — Z13.6 SCREENING FOR CARDIOVASCULAR CONDITION: ICD-10-CM

## 2023-05-31 DIAGNOSIS — K21.9 GERD WITHOUT ESOPHAGITIS: ICD-10-CM

## 2023-05-31 DIAGNOSIS — I87.2 VENOUS INSUFFICIENCY OF BOTH LOWER EXTREMITIES: ICD-10-CM

## 2023-05-31 DIAGNOSIS — G89.29 CHRONIC RIGHT SHOULDER PAIN: ICD-10-CM

## 2023-05-31 DIAGNOSIS — Z00.00 MEDICARE ANNUAL WELLNESS VISIT, SUBSEQUENT: Primary | ICD-10-CM

## 2023-05-31 DIAGNOSIS — R60.0 BILATERAL LOWER EXTREMITY EDEMA: ICD-10-CM

## 2023-05-31 DIAGNOSIS — R73.03 PREDIABETES: ICD-10-CM

## 2023-05-31 LAB — SL AMB POCT HEMOGLOBIN AIC: 6 (ref ?–6.5)

## 2023-05-31 NOTE — PATIENT INSTRUCTIONS
Medicare Preventive Visit Patient Instructions  Thank you for completing your Welcome to Medicare Visit or Medicare Annual Wellness Visit today  Your next wellness visit will be due in one year (5/31/2024)  The screening/preventive services that you may require over the next 5-10 years are detailed below  Some tests may not apply to you based off risk factors and/or age  Screening tests ordered at today's visit but not completed yet may show as past due  Also, please note that scanned in results may not display below  Preventive Screenings:  Service Recommendations Previous Testing/Comments   Colorectal Cancer Screening  * Colonoscopy    * Fecal Occult Blood Test (FOBT)/Fecal Immunochemical Test (FIT)  * Fecal DNA/Cologuard Test  * Flexible Sigmoidoscopy Age: 39-70 years old   Colonoscopy: every 10 years (may be performed more frequently if at higher risk)  OR  FOBT/FIT: every 1 year  OR  Cologuard: every 3 years  OR  Sigmoidoscopy: every 5 years  Screening may be recommended earlier than age 39 if at higher risk for colorectal cancer  Also, an individualized decision between you and your healthcare provider will decide whether screening between the ages of 74-80 would be appropriate  Colonoscopy: 07/11/2002  FOBT/FIT: Not on file  Cologuard: Not on file  Sigmoidoscopy: Not on file          Breast Cancer Screening Age: 36 years old  Frequency: every 1-2 years  Not required if history of left and right mastectomy Mammogram: 06/13/2022    Screening Current   Cervical Cancer Screening Between the ages of 21-29, pap smear recommended once every 3 years  Between the ages of 33-67, can perform pap smear with HPV co-testing every 5 years     Recommendations may differ for women with a history of total hysterectomy, cervical cancer, or abnormal pap smears in past  Pap Smear: 02/17/2021    Screening Not Indicated   Hepatitis C Screening Once for adults born between 1945 and 1965  More frequently in patients at high risk for Hepatitis C Hep C Antibody: 04/25/2018    Screening Current   Diabetes Screening 1-2 times per year if you're at risk for diabetes or have pre-diabetes Fasting glucose: 90 mg/dL (5/31/2022)  A1C: 6 0 % (5/31/2022)  Screening Current   Cholesterol Screening Once every 5 years if you don't have a lipid disorder  May order more often based on risk factors  Lipid panel: 05/31/2022    Screening Not Indicated  History Lipid Disorder     Other Preventive Screenings Covered by Medicare:  Abdominal Aortic Aneurysm (AAA) Screening: covered once if your at risk  You're considered to be at risk if you have a family history of AAA  Lung Cancer Screening: covers low dose CT scan once per year if you meet all of the following conditions: (1) Age 50-69; (2) No signs or symptoms of lung cancer; (3) Current smoker or have quit smoking within the last 15 years; (4) You have a tobacco smoking history of at least 20 pack years (packs per day multiplied by number of years you smoked); (5) You get a written order from a healthcare provider  Glaucoma Screening: covered annually if you're considered high risk: (1) You have diabetes OR (2) Family history of glaucoma OR (3)  aged 48 and older OR (3)  American aged 72 and older  Osteoporosis Screening: covered every 2 years if you meet one of the following conditions: (1) You're estrogen deficient and at risk for osteoporosis based off medical history and other findings; (2) Have a vertebral abnormality; (3) On glucocorticoid therapy for more than 3 months; (4) Have primary hyperparathyroidism; (5) On osteoporosis medications and need to assess response to drug therapy  Last bone density test (DXA Scan): 06/13/2022  HIV Screening: covered annually if you're between the age of 12-76  Also covered annually if you are younger than 13 and older than 72 with risk factors for HIV infection   For pregnant patients, it is covered up to 3 times per pregnancy  Immunizations:  Immunization Recommendations   Influenza Vaccine Annual influenza vaccination during flu season is recommended for all persons aged >= 6 months who do not have contraindications   Pneumococcal Vaccine   * Pneumococcal conjugate vaccine = PCV13 (Prevnar 13), PCV15 (Vaxneuvance), PCV20 (Prevnar 20)  * Pneumococcal polysaccharide vaccine = PPSV23 (Pneumovax) Adults 25-60 years old: 1-3 doses may be recommended based on certain risk factors  Adults 72 years old: 1-2 doses may be recommended based off what pneumonia vaccine you previously received   Hepatitis B Vaccine 3 dose series if at intermediate or high risk (ex: diabetes, end stage renal disease, liver disease)   Tetanus (Td) Vaccine - COST NOT COVERED BY MEDICARE PART B Following completion of primary series, a booster dose should be given every 10 years to maintain immunity against tetanus  Td may also be given as tetanus wound prophylaxis  Tdap Vaccine - COST NOT COVERED BY MEDICARE PART B Recommended at least once for all adults  For pregnant patients, recommended with each pregnancy  Shingles Vaccine (Shingrix) - COST NOT COVERED BY MEDICARE PART B  2 shot series recommended in those aged 48 and above     Health Maintenance Due:      Topic Date Due    Breast Cancer Screening: Mammogram  06/13/2023    Hepatitis C Screening  Completed    Colorectal Cancer Screening  Discontinued     Immunizations Due:      Topic Date Due    COVID-19 Vaccine (1) Never done    Pneumococcal Vaccine: 65+ Years (1 - PCV) Never done     Advance Directives   What are advance directives? Advance directives are legal documents that state your wishes and plans for medical care  These plans are made ahead of time in case you lose your ability to make decisions for yourself  Advance directives can apply to any medical decision, such as the treatments you want, and if you want to donate organs  What are the types of advance directives?   There are many types of advance directives, and each state has rules about how to use them  You may choose a combination of any of the following:  Living will: This is a written record of the treatment you want  You can also choose which treatments you do not want, which to limit, and which to stop at a certain time  This includes surgery, medicine, IV fluid, and tube feedings  Durable power of  for healthcare [place] [facility]): This is a written record that states who you want to make healthcare choices for you when you are unable to make them for yourself  This person, called a proxy, is usually a family member or a friend  You may choose more than 1 proxy  Do not resuscitate (DNR) order:  A DNR order is used in case your heart stops beating or you stop breathing  It is a request not to have certain forms of treatment, such as CPR  A DNR order may be included in other types of advance directives  Medical directive: This covers the care that you want if you are in a coma, near death, or unable to make decisions for yourself  You can list the treatments you want for each condition  Treatment may include pain medicine, surgery, blood transfusions, dialysis, IV or tube feedings, and a ventilator (breathing machine)  Values history: This document has questions about your views, beliefs, and how you feel and think about life  This information can help others choose the care that you would choose  Why are advance directives important? An advance directive helps you control your care  Although spoken wishes may be used, it is better to have your wishes written down  Spoken wishes can be misunderstood, or not followed  Treatments may be given even if you do not want them  An advance directive may make it easier for your family to make difficult choices about your care  Urinary Incontinence   Urinary incontinence (UI)  is when you lose control of your bladder   UI develops because your bladder cannot store or empty urine properly  The 3 most common types of UI are stress incontinence, urge incontinence, or both  Medicines:   May be given to help strengthen your bladder control  Report any side effects of medication to your healthcare provider  Do pelvic muscle exercises often:  Your pelvic muscles help you stop urinating  Squeeze these muscles tight for 5 seconds, then relax for 5 seconds  Gradually work up to squeezing for 10 seconds  Do 3 sets of 15 repetitions a day, or as directed  This will help strengthen your pelvic muscles and improve bladder control  Train your bladder:  Go to the bathroom at set times, such as every 2 hours, even if you do not feel the urge to go  You can also try to hold your urine when you feel the urge to go  For example, hold your urine for 5 minutes when you feel the urge to go  As that becomes easier, hold your urine for 10 minutes  Self-care:   Keep a UI record  Write down how often you leak urine and how much you leak  Make a note of what you were doing when you leaked urine  Drink liquids as directed  You may need to limit the amount of liquid you drink to help control your urine leakage  Do not drink any liquid right before you go to bed  Limit or do not have drinks that contain caffeine or alcohol  Prevent constipation  Eat a variety of high-fiber foods  Good examples are high-fiber cereals, beans, vegetables, and whole-grain breads  Walking is the best way to trigger your intestines to have a bowel movement  Exercise regularly and maintain a healthy weight  Weight loss and exercise will decrease pressure on your bladder and help you control your leakage  Use a catheter as directed  to help empty your bladder  A catheter is a tiny, plastic tube that is put into your bladder to drain your urine  Go to behavior therapy as directed  Behavior therapy may be used to help you learn to control your urge to urinate      Weight Management   Why it is important to manage your weight: Being overweight increases your risk of health conditions such as heart disease, high blood pressure, type 2 diabetes, and certain types of cancer  It can also increase your risk for osteoarthritis, sleep apnea, and other respiratory problems  Aim for a slow, steady weight loss  Even a small amount of weight loss can lower your risk of health problems  How to lose weight safely:  A safe and healthy way to lose weight is to eat fewer calories and get regular exercise  You can lose up about 1 pound a week by decreasing the number of calories you eat by 500 calories each day  Healthy meal plan for weight management:  A healthy meal plan includes a variety of foods, contains fewer calories, and helps you stay healthy  A healthy meal plan includes the following:  Eat whole-grain foods more often  A healthy meal plan should contain fiber  Fiber is the part of grains, fruits, and vegetables that is not broken down by your body  Whole-grain foods are healthy and provide extra fiber in your diet  Some examples of whole-grain foods are whole-wheat breads and pastas, oatmeal, brown rice, and bulgur  Eat a variety of vegetables every day  Include dark, leafy greens such as spinach, kale, arsen greens, and mustard greens  Eat yellow and orange vegetables such as carrots, sweet potatoes, and winter squash  Eat a variety of fruits every day  Choose fresh or canned fruit (canned in its own juice or light syrup) instead of juice  Fruit juice has very little or no fiber  Eat low-fat dairy foods  Drink fat-free (skim) milk or 1% milk  Eat fat-free yogurt and low-fat cottage cheese  Try low-fat cheeses such as mozzarella and other reduced-fat cheeses  Choose meat and other protein foods that are low in fat  Choose beans or other legumes such as split peas or lentils  Choose fish, skinless poultry (chicken or turkey), or lean cuts of red meat (beef or pork)  Before you cook meat or poultry, cut off any visible fat     Use less fat and oil  Try baking foods instead of frying them  Add less fat, such as margarine, sour cream, regular salad dressing and mayonnaise to foods  Eat fewer high-fat foods  Some examples of high-fat foods include french fries, doughnuts, ice cream, and cakes  Eat fewer sweets  Limit foods and drinks that are high in sugar  This includes candy, cookies, regular soda, and sweetened drinks  Exercise:  Exercise at least 30 minutes per day on most days of the week  Some examples of exercise include walking, biking, dancing, and swimming  You can also fit in more physical activity by taking the stairs instead of the elevator or parking farther away from stores  Ask your healthcare provider about the best exercise plan for you  © Copyright [employer] 2018 Information is for End User's use only and may not be sold, redistributed or otherwise used for commercial purposes  All illustrations and images included in CareNotes® are the copyrighted property of [employer]  or [facility] Preventive Visit Patient Instructions  Thank you for completing your Welcome to Medicare Visit or Medicare Annual Wellness Visit today  Your next wellness visit will be due in one year (5/31/2024)  The screening/preventive services that you may require over the next 5-10 years are detailed below  Some tests may not apply to you based off risk factors and/or age  Screening tests ordered at today's visit but not completed yet may show as past due  Also, please note that scanned in results may not display below    Preventive Screenings:  Service Recommendations Previous Testing/Comments   Colorectal Cancer Screening  * Colonoscopy    * Fecal Occult Blood Test (FOBT)/Fecal Immunochemical Test (FIT)  * Fecal DNA/Cologuard Test  * Flexible Sigmoidoscopy Age: 39-70 years old   Colonoscopy: every 10 years (may be performed more frequently if at higher risk)  OR  FOBT/FIT: every 1 year  OR  Cologuard: every 3 years OR  Sigmoidoscopy: every 5 years  Screening may be recommended earlier than age 39 if at higher risk for colorectal cancer  Also, an individualized decision between you and your healthcare provider will decide whether screening between the ages of 74-80 would be appropriate  Colonoscopy: 07/11/2002  FOBT/FIT: Not on file  Cologuard: Not on file  Sigmoidoscopy: Not on file          Breast Cancer Screening Age: 36 years old  Frequency: every 1-2 years  Not required if history of left and right mastectomy Mammogram: 06/13/2022    Screening Current   Cervical Cancer Screening Between the ages of 21-29, pap smear recommended once every 3 years  Between the ages of 33-67, can perform pap smear with HPV co-testing every 5 years  Recommendations may differ for women with a history of total hysterectomy, cervical cancer, or abnormal pap smears in past  Pap Smear: 02/17/2021    Screening Not Indicated   Hepatitis C Screening Once for adults born between 1945 and 1965  More frequently in patients at high risk for Hepatitis C Hep C Antibody: 04/25/2018    Screening Current   Diabetes Screening 1-2 times per year if you're at risk for diabetes or have pre-diabetes Fasting glucose: 90 mg/dL (5/31/2022)  A1C: 6 0 % (5/31/2022)  Screening Current   Cholesterol Screening Once every 5 years if you don't have a lipid disorder  May order more often based on risk factors  Lipid panel: 05/31/2022    Screening Not Indicated  History Lipid Disorder     Other Preventive Screenings Covered by Medicare:  Abdominal Aortic Aneurysm (AAA) Screening: covered once if your at risk  You're considered to be at risk if you have a family history of AAA    Lung Cancer Screening: covers low dose CT scan once per year if you meet all of the following conditions: (1) Age 50-69; (2) No signs or symptoms of lung cancer; (3) Current smoker or have quit smoking within the last 15 years; (4) You have a tobacco smoking history of at least 20 pack years (packs per day multiplied by number of years you smoked); (5) You get a written order from a healthcare provider  Glaucoma Screening: covered annually if you're considered high risk: (1) You have diabetes OR (2) Family history of glaucoma OR (3)  aged 48 and older OR (3)  American aged 72 and older  Osteoporosis Screening: covered every 2 years if you meet one of the following conditions: (1) You're estrogen deficient and at risk for osteoporosis based off medical history and other findings; (2) Have a vertebral abnormality; (3) On glucocorticoid therapy for more than 3 months; (4) Have primary hyperparathyroidism; (5) On osteoporosis medications and need to assess response to drug therapy  Last bone density test (DXA Scan): 06/13/2022  HIV Screening: covered annually if you're between the age of 12-76  Also covered annually if you are younger than 13 and older than 72 with risk factors for HIV infection  For pregnant patients, it is covered up to 3 times per pregnancy  Immunizations:  Immunization Recommendations   Influenza Vaccine Annual influenza vaccination during flu season is recommended for all persons aged >= 6 months who do not have contraindications   Pneumococcal Vaccine   * Pneumococcal conjugate vaccine = PCV13 (Prevnar 13), PCV15 (Vaxneuvance), PCV20 (Prevnar 20)  * Pneumococcal polysaccharide vaccine = PPSV23 (Pneumovax) Adults 25-60 years old: 1-3 doses may be recommended based on certain risk factors  Adults 72 years old: 1-2 doses may be recommended based off what pneumonia vaccine you previously received   Hepatitis B Vaccine 3 dose series if at intermediate or high risk (ex: diabetes, end stage renal disease, liver disease)   Tetanus (Td) Vaccine - COST NOT COVERED BY MEDICARE PART B Following completion of primary series, a booster dose should be given every 10 years to maintain immunity against tetanus  Td may also be given as tetanus wound prophylaxis     Tdap Vaccine - COST NOT COVERED BY MEDICARE PART B Recommended at least once for all adults  For pregnant patients, recommended with each pregnancy  Shingles Vaccine (Shingrix) - COST NOT COVERED BY MEDICARE PART B  2 shot series recommended in those aged 48 and above     Health Maintenance Due:      Topic Date Due    Breast Cancer Screening: Mammogram  06/13/2023    Hepatitis C Screening  Completed    Colorectal Cancer Screening  Discontinued     Immunizations Due:      Topic Date Due    COVID-19 Vaccine (1) Never done    Pneumococcal Vaccine: 65+ Years (1 - PCV) Never done     Advance Directives   What are advance directives? Advance directives are legal documents that state your wishes and plans for medical care  These plans are made ahead of time in case you lose your ability to make decisions for yourself  Advance directives can apply to any medical decision, such as the treatments you want, and if you want to donate organs  What are the types of advance directives? There are many types of advance directives, and each state has rules about how to use them  You may choose a combination of any of the following:  Living will: This is a written record of the treatment you want  You can also choose which treatments you do not want, which to limit, and which to stop at a certain time  This includes surgery, medicine, IV fluid, and tube feedings  Durable power of  for healthcare [facility]): This is a written record that states who you want to make healthcare choices for you when you are unable to make them for yourself  This person, called a proxy, is usually a family member or a friend  You may choose more than 1 proxy  Do not resuscitate (DNR) order:  A DNR order is used in case your heart stops beating or you stop breathing  It is a request not to have certain forms of treatment, such as CPR  A DNR order may be included in other types of advance directives  Medical directive:   This covers the care that you want if you are in a coma, near death, or unable to make decisions for yourself  You can list the treatments you want for each condition  Treatment may include pain medicine, surgery, blood transfusions, dialysis, IV or tube feedings, and a ventilator (breathing machine)  Values history: This document has questions about your views, beliefs, and how you feel and think about life  This information can help others choose the care that you would choose  Why are advance directives important? An advance directive helps you control your care  Although spoken wishes may be used, it is better to have your wishes written down  Spoken wishes can be misunderstood, or not followed  Treatments may be given even if you do not want them  An advance directive may make it easier for your family to make difficult choices about your care  Urinary Incontinence   Urinary incontinence (UI)  is when you lose control of your bladder  UI develops because your bladder cannot store or empty urine properly  The 3 most common types of UI are stress incontinence, urge incontinence, or both  Medicines:   May be given to help strengthen your bladder control  Report any side effects of medication to your healthcare provider  Do pelvic muscle exercises often:  Your pelvic muscles help you stop urinating  Squeeze these muscles tight for 5 seconds, then relax for 5 seconds  Gradually work up to squeezing for 10 seconds  Do 3 sets of 15 repetitions a day, or as directed  This will help strengthen your pelvic muscles and improve bladder control  Train your bladder:  Go to the bathroom at set times, such as every 2 hours, even if you do not feel the urge to go  You can also try to hold your urine when you feel the urge to go  For example, hold your urine for 5 minutes when you feel the urge to go  As that becomes easier, hold your urine for 10 minutes  Self-care:   Keep a UI record    Write down how often you leak urine and how much you leak  Make a note of what you were doing when you leaked urine  Drink liquids as directed  You may need to limit the amount of liquid you drink to help control your urine leakage  Do not drink any liquid right before you go to bed  Limit or do not have drinks that contain caffeine or alcohol  Prevent constipation  Eat a variety of high-fiber foods  Good examples are high-fiber cereals, beans, vegetables, and whole-grain breads  Walking is the best way to trigger your intestines to have a bowel movement  Exercise regularly and maintain a healthy weight  Weight loss and exercise will decrease pressure on your bladder and help you control your leakage  Use a catheter as directed  to help empty your bladder  A catheter is a tiny, plastic tube that is put into your bladder to drain your urine  Go to behavior therapy as directed  Behavior therapy may be used to help you learn to control your urge to urinate  Weight Management   Why it is important to manage your weight:  Being overweight increases your risk of health conditions such as heart disease, high blood pressure, type 2 diabetes, and certain types of cancer  It can also increase your risk for osteoarthritis, sleep apnea, and other respiratory problems  Aim for a slow, steady weight loss  Even a small amount of weight loss can lower your risk of health problems  How to lose weight safely:  A safe and healthy way to lose weight is to eat fewer calories and get regular exercise  You can lose up about 1 pound a week by decreasing the number of calories you eat by 500 calories each day  Healthy meal plan for weight management:  A healthy meal plan includes a variety of foods, contains fewer calories, and helps you stay healthy  A healthy meal plan includes the following:  Eat whole-grain foods more often  A healthy meal plan should contain fiber  Fiber is the part of grains, fruits, and vegetables that is not broken down by your body   Whole-grain foods are healthy and provide extra fiber in your diet  Some examples of whole-grain foods are whole-wheat breads and pastas, oatmeal, brown rice, and bulgur  Eat a variety of vegetables every day  Include dark, leafy greens such as spinach, kale, arsen greens, and mustard greens  Eat yellow and orange vegetables such as carrots, sweet potatoes, and winter squash  Eat a variety of fruits every day  Choose fresh or canned fruit (canned in its own juice or light syrup) instead of juice  Fruit juice has very little or no fiber  Eat low-fat dairy foods  Drink fat-free (skim) milk or 1% milk  Eat fat-free yogurt and low-fat cottage cheese  Try low-fat cheeses such as mozzarella and other reduced-fat cheeses  Choose meat and other protein foods that are low in fat  Choose beans or other legumes such as split peas or lentils  Choose fish, skinless poultry (chicken or turkey), or lean cuts of red meat (beef or pork)  Before you cook meat or poultry, cut off any visible fat  Use less fat and oil  Try baking foods instead of frying them  Add less fat, such as margarine, sour cream, regular salad dressing and mayonnaise to foods  Eat fewer high-fat foods  Some examples of high-fat foods include french fries, doughnuts, ice cream, and cakes  Eat fewer sweets  Limit foods and drinks that are high in sugar  This includes candy, cookies, regular soda, and sweetened drinks  Exercise:  Exercise at least 30 minutes per day on most days of the week  Some examples of exercise include walking, biking, dancing, and swimming  You can also fit in more physical activity by taking the stairs instead of the elevator or parking farther away from stores  Ask your healthcare provider about the best exercise plan for you  © Copyright [employer] 2018 Information is for End User's use only and may not be sold, redistributed or otherwise used for commercial purposes   All illustrations and images included in CareNotes® are the copyrighted property of A D A M , [employer]  or Jerrica Fenton     ________________________________________________    Problem List Items Addressed This Visit          Digestive    GERD without esophagitis       Cardiovascular and Mediastinum    Venous insufficiency of both lower extremities       Musculoskeletal and Integument    Primary osteoarthritis involving multiple joints       Other    Chronic lower back pain    Chronic right shoulder pain    Mixed hyperlipidemia    Vitamin D deficiency    Relevant Orders    Vitamin D 25 hydroxy    Bilateral lower extremity edema    Continuous opioid dependence ([address] )    Medicare annual wellness visit, subsequent - Primary     Lifestyle modification, diet and exercise discussed  Medications discussed and refilled appropriately  Labs discussed and ordered   Hepatitis C screening discussed  HIV screening discussed  Depression screening performed  Anxiety screening performed  Urinary Incontinence screening performed   BMI discussed  Mammogram ordered  Fall screening performed         Relevant Orders    CBC and differential    Comprehensive metabolic panel    Prediabetes     In office HgbA1c: 6 0         Relevant Orders    POCT hemoglobin A1c    Dry eye syndrome of both eyes     Other Visit Diagnoses       Screening for cardiovascular condition        Relevant Orders    Lipid panel    Encounter for screening mammogram for breast cancer        Relevant Orders    Mammo screening bilateral w 3d & cad

## 2023-05-31 NOTE — PROGRESS NOTES
Assessment and Plan:     Problem List Items Addressed This Visit        Digestive    GERD without esophagitis       Cardiovascular and Mediastinum    Venous insufficiency of both lower extremities       Musculoskeletal and Integument    Primary osteoarthritis involving multiple joints       Other    Chronic lower back pain    Chronic right shoulder pain    Mixed hyperlipidemia    Vitamin D deficiency    Relevant Orders    Vitamin D 25 hydroxy    Bilateral lower extremity edema    Continuous opioid dependence ([address] )    Medicare annual wellness visit, subsequent - Primary     Lifestyle modification, diet and exercise discussed  Medications discussed and refilled appropriately  Labs discussed and ordered   Hepatitis C screening discussed  HIV screening discussed  Depression screening performed  Anxiety screening performed  Urinary Incontinence screening performed   BMI discussed  Mammogram ordered  Fall screening performed         Relevant Orders    CBC and differential    Comprehensive metabolic panel    Prediabetes     In office HgbA1c: 6 0         Relevant Orders    POCT hemoglobin A1c    Dry eye syndrome of both eyes   Other Visit Diagnoses     Screening for cardiovascular condition        Relevant Orders    Lipid panel    Encounter for screening mammogram for breast cancer        Relevant Orders    Mammo screening bilateral w 3d & cad        BMI Counseling: Body mass index is 33 05 kg/m²  The BMI is above normal  Nutrition recommendations include decreasing portion sizes, encouraging healthy choices of fruits and vegetables, decreasing fast food intake, consuming healthier snacks, limiting drinks that contain sugar, moderation in carbohydrate intake, increasing intake of lean protein, reducing intake of saturated and trans fat and reducing intake of cholesterol  Exercise recommendations include exercising 3-5 times per week  No pharmacotherapy was ordered   Rationale for BMI follow-up plan is due to patient being overweight or obese  Depression Screening and Follow-up Plan: Patient was screened for depression during today's encounter  They screened negative with a PHQ-2 score of 0  Falls Plan of Care: Assessed feet and footwear  Home safety education provided  Urinary Incontinence Plan of Care: counseling topics discussed: practice Kegel (pelvic floor strengthening) exercises, use restroom every 2 hours and improving blood sugar control  Preventive health issues were discussed with patient, and age appropriate screening tests were ordered as noted in patient's After Visit Summary  Personalized health advice and appropriate referrals for health education or preventive services given if needed, as noted in patient's After Visit Summary  History of Present Illness:     Patient presents for a Medicare Wellness Visit    The patient is here today to discuss her Medicare Annual Wellness Visit  Please continue to the Shakeel [place] section of the note for details of today's visit  Patient Care Team:  Carol Rehman as PCP - General (Internal Medicine)  Jah Araiza DO as PCP - [address] [place] [address] (RTE)     Review of Systems:     Review of Systems   Constitutional: Negative for activity change, chills, fatigue and fever  HENT: Negative for rhinorrhea and sore throat  Eyes: Negative for pain  Respiratory: Negative for cough and shortness of breath  Cardiovascular: Negative for chest pain, palpitations and leg swelling  Gastrointestinal: Negative for abdominal pain, constipation, diarrhea, nausea and vomiting  Genitourinary: Negative for difficulty urinating, flank pain, frequency and urgency  Musculoskeletal: Positive for arthralgias, back pain and myalgias  Negative for gait problem and joint swelling  Skin: Negative for color change  Neurological: Negative for dizziness, weakness, light-headedness and headaches  Psychiatric/Behavioral: Negative for sleep disturbance   The patient is not nervous/anxious  All other systems reviewed and are negative         Problem List:     Patient Active Problem List   Diagnosis   • Chronic lower back pain   • Chronic right shoulder pain   • GERD without esophagitis   • Mixed hyperlipidemia   • Nephrolithiasis   • Sciatica of left side   • Vitamin D deficiency   • Primary osteoarthritis involving multiple joints   • Venous insufficiency of both lower extremities   • Diastasis recti   • Hydroxyapatite deposition disease of hip, left   • Bilateral lower extremity edema   • Abnormal MRI, lumbar spine   • Rib pain   • Tongue edema   • Continuous opioid dependence ([facility] [address] )   • Medicare annual wellness visit, subsequent   • Prediabetes   • Dry eye syndrome of both eyes      Past Medical and Surgical History:     Past Medical History:   Diagnosis Date   • Abnormal electrocardiogram     last assessed 1/8/16   • Chronic kidney disease    • Kidney stone    • Primary osteoarthritis involving multiple joints 11/7/2019     Past Surgical History:   Procedure Laterality Date   • EYE SURGERY     • TONSILLECTOMY     • TOOTH EXTRACTION     • TUBAL LIGATION        Family History:     Family History   Problem Relation Age of Onset   • Other Mother         sheehans syndrome   • Aneurysm Father    • Nephrolithiasis Father    • Hypertension Sister    • Cirrhosis Brother         alcoholic liver   • No Known Problems Daughter    • No Known Problems Sister    • No Known Problems Daughter    • No Known Problems Daughter    • Other Daughter    • No Known Problems Maternal Aunt    • No Known Problems Maternal Grandmother    • No Known Problems Paternal Grandmother       Social History:     Social History     Socioeconomic History   • Marital status: /Civil Union     Spouse name: None   • Number of children: None   • Years of education: None   • Highest education level: None   Occupational History   • Occupation: Retired   Tobacco Use   • Smoking status: Former   • Smokeless tobacco: Never   Vaping Use   • Vaping Use: Never used   Substance and Sexual Activity   • Alcohol use: Yes     Comment: occasional   • Drug use: No   • Sexual activity: Not Currently   Other Topics Concern   • None   Social History Narrative    RETIRED     Social Determinants of Health     Financial Resource Strain: Low Risk  (5/31/2023)    Overall Financial Resource Strain (CARDIA)    • Difficulty of Paying Living Expenses: Not very hard   Food Insecurity: Not on file   Transportation Needs: No Transportation Needs (5/31/2023)    PRAPARE - Transportation    • Lack of Transportation (Medical): No    • Lack of Transportation (Non-Medical): No   Physical Activity: Not on file   Stress: Not on file   Social Connections: Not on file   Intimate Partner Violence: Not on file   Housing Stability: Not on file      Medications and Allergies:     Current Outpatient Medications   Medication Sig Dispense Refill   • cholecalciferol (VITAMIN D) 400 units/mL Take 400 Units by mouth daily     • COD LIVER OIL W/VIT A & D PO Take 1 capsule by mouth in the morning     • cyclobenzaprine (FLEXERIL) 5 mg tablet Take 1 tablet (5 mg total) by mouth 3 (three) times a day as needed for muscle spasms 60 tablet 0   • Diclofenac Sodium (VOLTAREN) 1 % Apply 2 g topically 4 (four) times a day 100 g 3   • hydrochlorothiazide (HYDRODIURIL) 12 5 mg tablet Take 1 tablet (12 5 mg total) by mouth daily 90 tablet 0   • hydrOXYzine HCL (ATARAX) 25 mg tablet hydroxyzine HCl 25 mg tablet   TAKE 1 TABLET BY MOUTH EVERY 6 HOURS AS NEEDED FOR ITCHING     • metroNIDAZOLE (METROGEL) 0 75 % vaginal gel Insert 1 application   into the vagina 2 (two) times a day 70 g 3   • Multiple Minerals-Vitamins (CALCIUM-MAGNESIUM-ZINC-D3 PO) Take 1 tablet by mouth in the morning     • nystatin (MYCOSTATIN) 500,000 units/5 mL suspension Apply 5 mL (500,000 Units total) to the mouth or throat 4 (four) times a day 473 mL 1   • nystatin-triamcinolone (MYCOLOG-II) ointment Apply 1 application  topically 2 (two) times a day 60 g 3   • olopatadine HCl (PATADAY) 0 2 % opth drops Administer 1 drop to both eyes in the morning 10 mL 1   • pantoprazole (PROTONIX) 20 mg tablet Take 1 tablet (20 mg total) by mouth daily 90 tablet 3   • Probiotic Product (PROBIOTIC BLEND PO) Take 1 capsule by mouth in the morning SYNBIOTIC     • traMADol (Ultram) 50 mg tablet Take 1 tablet (50 mg total) by mouth every 6 (six) hours as needed for moderate pain 30 tablet 0   • guaiFENesin (MUCINEX) 600 mg 12 hr tablet Take 1 tablet (600 mg total) by mouth every 12 (twelve) hours (Patient not taking: Reported on 3/30/2023) 60 tablet 0     No current facility-administered medications for this visit  Allergies   Allergen Reactions   • Influenza Virus Vaccine Swelling   • Doxycycline Rash      Immunizations: There is no immunization history on file for this patient  Health Maintenance:         Topic Date Due   • Breast Cancer Screening: Mammogram  06/13/2023   • Hepatitis C Screening  Completed   • Colorectal Cancer Screening  Discontinued         Topic Date Due   • COVID-19 Vaccine (1) Never done   • Pneumococcal Vaccine: 65+ Years (1 - PCV) Never done      Medicare Screening Tests and Risk Assessments:     Raffi Mora is here for her Subsequent Wellness visit  Health Risk Assessment:   Patient rates overall health as good  Patient feels that their physical health rating is slightly worse  Patient is satisfied with their life  Eyesight was rated as same  Hearing was rated as same  Patient feels that their emotional and mental health rating is slightly worse  Patients states they are never, rarely angry  Patient states they are always unusually tired/fatigued  Pain experienced in the last 7 days has been a lot  Patient's pain rating has been 7/10  Patient states that she has experienced no weight loss or gain in last 6 months  Depression Screening:   PHQ-2 Score: 0      Fall Risk Screening:    In the past year, patient has experienced: no history of falling in past year      Urinary Incontinence Screening:   Patient has leaked urine accidently in the last six months  Home Safety:  Patient has trouble with stairs inside or outside of their home  Patient has working smoke alarms and has working carbon monoxide detector  Nutrition:   Current diet is Regular and Limited junk food  Medications:   Patient is currently taking over-the-counter supplements  OTC medications include: see medication list  Patient is able to manage medications  Activities of Daily Living (ADLs)/Instrumental Activities of Daily Living (IADLs):   Walk and transfer into and out of bed and chair?: Yes  Dress and groom yourself?: Yes    Bathe or shower yourself?: Yes    Feed yourself? Yes  Do your laundry/housekeeping?: Yes  Manage your money, pay your bills and track your expenses?: Yes  Make your own meals?: Yes    Do your own shopping?: Yes    Previous Hospitalizations:   Any hospitalizations or ED visits within the last 12 months?: Yes      Advance Care Planning:   Living will: Yes    Durable POA for healthcare:  Yes    Advanced directive: Yes      PREVENTIVE SCREENINGS      Cardiovascular Screening:    General: Screening Not Indicated and History Lipid Disorder      Diabetes Screening:     General: Screening Current      Breast Cancer Screening:     General: Screening Current      Cervical Cancer Screening:    General: Screening Not Indicated      Lung Cancer Screening:     General: Screening Not Indicated      Hepatitis C Screening:    General: Screening Current    Screening, Brief Intervention, and Referral to Treatment (SBIRT)    Screening    Typical number of drinks in a week: 0    Single Item Drug Screening:  How often have you used an illegal drug (including marijuana) or a prescription medication for non-medical reasons in the past year? never    Single Item Drug Screen Score: 0  Interpretation: Negative screen for possible "drug use disorder    Other Counseling Topics:   Car/seat belt/driving safety and calcium and vitamin D intake and regular weightbearing exercise  No results found  Physical Exam:     /62 (BP Location: Left arm, Patient Position: Sitting, Cuff Size: Standard)   Pulse 79   Temp 97 9 °F (36 6 °C)   Ht 4' 11 5\" (1 511 m)   Wt 75 5 kg (166 lb 6 4 oz)   SpO2 96%   BMI 33 05 kg/m²     Physical Exam  Vitals and nursing note reviewed  Constitutional:       General: She is awake  She is not in acute distress  Appearance: Normal appearance  She is well-developed and overweight  HENT:      Head: Normocephalic and atraumatic  Nose: Nose normal       Mouth/Throat:      Mouth: Mucous membranes are moist    Eyes:      Conjunctiva/sclera: Conjunctivae normal    Cardiovascular:      Rate and Rhythm: Normal rate and regular rhythm  Pulses: Normal pulses  Heart sounds: Normal heart sounds  No murmur heard  Pulmonary:      Effort: Pulmonary effort is normal  No respiratory distress  Breath sounds: Normal breath sounds  Abdominal:      General: Bowel sounds are normal       Palpations: Abdomen is soft  Tenderness: There is no abdominal tenderness  Musculoskeletal:      Cervical back: Neck supple  Right lower leg: No edema  Left lower leg: No edema  Skin:     General: Skin is warm and dry  Neurological:      Mental Status: She is alert and oriented to person, place, and time  Psychiatric:         Attention and Perception: Attention normal          Mood and Affect: Mood normal          Speech: Speech normal          Behavior: Behavior normal  Behavior is cooperative            REGINA Hamm  "

## 2023-06-07 ENCOUNTER — APPOINTMENT (OUTPATIENT)
Dept: LAB | Facility: CLINIC | Age: 78
End: 2023-06-07
Payer: COMMERCIAL

## 2023-06-07 ENCOUNTER — TELEPHONE (OUTPATIENT)
Dept: HEMATOLOGY ONCOLOGY | Facility: CLINIC | Age: 78
End: 2023-06-07

## 2023-06-07 DIAGNOSIS — Z00.00 MEDICARE ANNUAL WELLNESS VISIT, SUBSEQUENT: ICD-10-CM

## 2023-06-07 DIAGNOSIS — Z13.6 SCREENING FOR CARDIOVASCULAR CONDITION: ICD-10-CM

## 2023-06-07 DIAGNOSIS — E55.9 VITAMIN D DEFICIENCY: ICD-10-CM

## 2023-06-07 LAB
ALBUMIN SERPL BCP-MCNC: 3.6 G/DL (ref 3.5–5)
ALP SERPL-CCNC: 85 U/L (ref 46–116)
ALT SERPL W P-5'-P-CCNC: 30 U/L (ref 12–78)
ANION GAP SERPL CALCULATED.3IONS-SCNC: 1 MMOL/L (ref 4–13)
AST SERPL W P-5'-P-CCNC: 37 U/L (ref 5–45)
BASOPHILS # BLD AUTO: 0.06 THOUSANDS/ÂΜL (ref 0–0.1)
BASOPHILS NFR BLD AUTO: 1 % (ref 0–1)
BILIRUB SERPL-MCNC: 0.38 MG/DL (ref 0.2–1)
BUN SERPL-MCNC: 12 MG/DL (ref 5–25)
CALCIUM SERPL-MCNC: 9.1 MG/DL (ref 8.3–10.1)
CHLORIDE SERPL-SCNC: 107 MMOL/L (ref 96–108)
CHOLEST SERPL-MCNC: 184 MG/DL
CO2 SERPL-SCNC: 28 MMOL/L (ref 21–32)
CREAT SERPL-MCNC: 0.65 MG/DL (ref 0.6–1.3)
EOSINOPHIL # BLD AUTO: 0.54 THOUSAND/ÂΜL (ref 0–0.61)
EOSINOPHIL NFR BLD AUTO: 7 % (ref 0–6)
ERYTHROCYTE [DISTWIDTH] IN BLOOD BY AUTOMATED COUNT: 13.8 % (ref 11.6–15.1)
GFR SERPL CREATININE-BSD FRML MDRD: 85 ML/MIN/1.73SQ M
GLUCOSE P FAST SERPL-MCNC: 95 MG/DL (ref 65–99)
HCT VFR BLD AUTO: 39.2 % (ref 34.8–46.1)
HDLC SERPL-MCNC: 64 MG/DL
HGB BLD-MCNC: 12.4 G/DL (ref 11.5–15.4)
IMM GRANULOCYTES # BLD AUTO: 0.02 THOUSAND/UL (ref 0–0.2)
IMM GRANULOCYTES NFR BLD AUTO: 0 % (ref 0–2)
LDLC SERPL CALC-MCNC: 102 MG/DL (ref 0–100)
LYMPHOCYTES # BLD AUTO: 1.52 THOUSANDS/ÂΜL (ref 0.6–4.47)
LYMPHOCYTES NFR BLD AUTO: 21 % (ref 14–44)
MCH RBC QN AUTO: 27.9 PG (ref 26.8–34.3)
MCHC RBC AUTO-ENTMCNC: 31.6 G/DL (ref 31.4–37.4)
MCV RBC AUTO: 88 FL (ref 82–98)
MONOCYTES # BLD AUTO: 0.67 THOUSAND/ÂΜL (ref 0.17–1.22)
MONOCYTES NFR BLD AUTO: 9 % (ref 4–12)
NEUTROPHILS # BLD AUTO: 4.58 THOUSANDS/ÂΜL (ref 1.85–7.62)
NEUTS SEG NFR BLD AUTO: 62 % (ref 43–75)
NONHDLC SERPL-MCNC: 120 MG/DL
NRBC BLD AUTO-RTO: 0 /100 WBCS
PLATELET # BLD AUTO: 255 THOUSANDS/UL (ref 149–390)
PMV BLD AUTO: 11.8 FL (ref 8.9–12.7)
POTASSIUM SERPL-SCNC: 4 MMOL/L (ref 3.5–5.3)
PROT SERPL-MCNC: 7.8 G/DL (ref 6.4–8.4)
RBC # BLD AUTO: 4.45 MILLION/UL (ref 3.81–5.12)
SODIUM SERPL-SCNC: 136 MMOL/L (ref 135–147)
TRIGL SERPL-MCNC: 89 MG/DL
WBC # BLD AUTO: 7.39 THOUSAND/UL (ref 4.31–10.16)

## 2023-06-07 PROCEDURE — 85025 COMPLETE CBC W/AUTO DIFF WBC: CPT

## 2023-06-07 PROCEDURE — 36415 COLL VENOUS BLD VENIPUNCTURE: CPT

## 2023-06-07 PROCEDURE — 82306 VITAMIN D 25 HYDROXY: CPT

## 2023-06-07 PROCEDURE — 80061 LIPID PANEL: CPT

## 2023-06-07 PROCEDURE — 80053 COMPREHEN METABOLIC PANEL: CPT

## 2023-06-07 NOTE — TELEPHONE ENCOUNTER
Patient Call    Who are you speaking with? Patient    If it is not the patient, are they listed on an active communication consent form? N/A   What is the reason for this call? Pt wanted to confirm lab was open   Does this require a call back? No   If a call back is required, please list best call back number n/a   If a call back is required, advise that a message will be forwarded to their care team and someone will return their call as soon as possible  Did you relay this information to the patient?  N/A

## 2023-06-08 LAB — 25(OH)D3 SERPL-MCNC: 25.4 NG/ML (ref 30–100)

## 2023-06-28 DIAGNOSIS — M15.9 PRIMARY OSTEOARTHRITIS INVOLVING MULTIPLE JOINTS: ICD-10-CM

## 2023-06-28 DIAGNOSIS — M54.50 CHRONIC LOW BACK PAIN, UNSPECIFIED BACK PAIN LATERALITY, UNSPECIFIED WHETHER SCIATICA PRESENT: ICD-10-CM

## 2023-06-28 DIAGNOSIS — G89.29 CHRONIC LOW BACK PAIN, UNSPECIFIED BACK PAIN LATERALITY, UNSPECIFIED WHETHER SCIATICA PRESENT: ICD-10-CM

## 2023-06-28 DIAGNOSIS — J06.9 ACUTE URI: ICD-10-CM

## 2023-06-28 DIAGNOSIS — R07.81 RIB PAIN: ICD-10-CM

## 2023-06-28 RX ORDER — TRAMADOL HYDROCHLORIDE 50 MG/1
50 TABLET ORAL EVERY 6 HOURS PRN
Qty: 90 TABLET | Refills: 0 | Status: SHIPPED | OUTPATIENT
Start: 2023-06-28

## 2023-07-30 PROBLEM — Z00.00 MEDICARE ANNUAL WELLNESS VISIT, SUBSEQUENT: Status: RESOLVED | Noted: 2023-05-31 | Resolved: 2023-07-30

## 2023-09-19 DIAGNOSIS — J06.9 ACUTE URI: ICD-10-CM

## 2023-09-19 DIAGNOSIS — M54.50 CHRONIC LOW BACK PAIN, UNSPECIFIED BACK PAIN LATERALITY, UNSPECIFIED WHETHER SCIATICA PRESENT: ICD-10-CM

## 2023-09-19 DIAGNOSIS — G89.29 CHRONIC LOW BACK PAIN, UNSPECIFIED BACK PAIN LATERALITY, UNSPECIFIED WHETHER SCIATICA PRESENT: ICD-10-CM

## 2023-09-19 DIAGNOSIS — M15.9 PRIMARY OSTEOARTHRITIS INVOLVING MULTIPLE JOINTS: ICD-10-CM

## 2023-09-19 DIAGNOSIS — R07.81 RIB PAIN: ICD-10-CM

## 2023-09-19 RX ORDER — TRAMADOL HYDROCHLORIDE 50 MG/1
50 TABLET ORAL EVERY 6 HOURS PRN
Qty: 90 TABLET | Refills: 0 | Status: SHIPPED | OUTPATIENT
Start: 2023-09-19 | End: 2023-09-19 | Stop reason: SDUPTHER

## 2023-09-20 ENCOUNTER — TELEPHONE (OUTPATIENT)
Dept: INTERNAL MEDICINE CLINIC | Facility: CLINIC | Age: 78
End: 2023-09-20

## 2023-09-20 DIAGNOSIS — R07.81 RIB PAIN: ICD-10-CM

## 2023-09-20 DIAGNOSIS — M54.50 CHRONIC LOW BACK PAIN, UNSPECIFIED BACK PAIN LATERALITY, UNSPECIFIED WHETHER SCIATICA PRESENT: Primary | ICD-10-CM

## 2023-09-20 DIAGNOSIS — G89.29 CHRONIC LOW BACK PAIN, UNSPECIFIED BACK PAIN LATERALITY, UNSPECIFIED WHETHER SCIATICA PRESENT: Primary | ICD-10-CM

## 2023-09-20 RX ORDER — MELOXICAM 7.5 MG/1
7.5 TABLET ORAL DAILY
Qty: 30 TABLET | Refills: 0 | Status: SHIPPED | OUTPATIENT
Start: 2023-09-20

## 2023-09-20 RX ORDER — TRAMADOL HYDROCHLORIDE 50 MG/1
50 TABLET ORAL EVERY 6 HOURS PRN
Qty: 90 TABLET | Refills: 0 | Status: SHIPPED | OUTPATIENT
Start: 2023-09-20

## 2023-09-20 NOTE — TELEPHONE ENCOUNTER
Pt requesting a script for Meloxicam states daughter suggested to start this for her pain that she is having. Daughter takes it and says it really helps her. Pt uses [facility]. Please advise.

## 2023-10-22 DIAGNOSIS — M54.50 CHRONIC LOW BACK PAIN, UNSPECIFIED BACK PAIN LATERALITY, UNSPECIFIED WHETHER SCIATICA PRESENT: ICD-10-CM

## 2023-10-22 DIAGNOSIS — R07.81 RIB PAIN: ICD-10-CM

## 2023-10-22 DIAGNOSIS — G89.29 CHRONIC LOW BACK PAIN, UNSPECIFIED BACK PAIN LATERALITY, UNSPECIFIED WHETHER SCIATICA PRESENT: ICD-10-CM

## 2023-10-22 RX ORDER — MELOXICAM 7.5 MG/1
7.5 TABLET ORAL DAILY
Qty: 30 TABLET | Refills: 0 | Status: SHIPPED | OUTPATIENT
Start: 2023-10-22

## 2023-10-23 NOTE — TELEPHONE ENCOUNTER
Medication Refill Request     Name Meloxicam   Dose/Frequency 7.5 mg tab/ take 1 tab by mouth daily  Quantity 30 tabs  Verified pharmacy   [x]  Verified ordering Provider   [x]  Does patient have enough for the next 3 days?  Yes [] No [x]

## 2023-11-16 DIAGNOSIS — M54.50 CHRONIC LOW BACK PAIN, UNSPECIFIED BACK PAIN LATERALITY, UNSPECIFIED WHETHER SCIATICA PRESENT: ICD-10-CM

## 2023-11-16 DIAGNOSIS — G89.29 CHRONIC LOW BACK PAIN, UNSPECIFIED BACK PAIN LATERALITY, UNSPECIFIED WHETHER SCIATICA PRESENT: ICD-10-CM

## 2023-11-16 DIAGNOSIS — R07.81 RIB PAIN: ICD-10-CM

## 2023-11-16 RX ORDER — MELOXICAM 7.5 MG/1
7.5 TABLET ORAL DAILY
Qty: 30 TABLET | Refills: 0 | Status: SHIPPED | OUTPATIENT
Start: 2023-11-16

## 2023-11-29 PROBLEM — Z23 ENCOUNTER FOR IMMUNIZATION: Status: ACTIVE | Noted: 2023-11-29

## 2023-11-29 PROBLEM — K14.8 TONGUE EDEMA: Status: RESOLVED | Noted: 2023-03-30 | Resolved: 2023-11-29

## 2023-11-29 NOTE — ASSESSMENT & PLAN NOTE
Continue tramadol, flexeril, voltaren cream, mobic    Scheduled Medication Review: This patient was educated on side effects, risks of taking this type of medication which include abuse, misuse, dependence, addiction and tolerance. All questions were answered including different dosing levels, increasing and decreasing of of this medication. We discussed their continued open discussions with the PCP in order to make sure that they are on the correct dose. We reviewed the potential side effects of the medications including, but are not limited to, constipation, diarrhea, nausea/upset stomach, drowsiness, fatigue, dizziness, urinary retention, visual changes, insomnia, headaches, nightmares, slowed breathing while sleeping, UTI issues, impaired judgment, addiction issues and the risk of fatal overdose if not taken as prescribed. We discussed the importance of notifying the office/provider immediately of any side effects. We discussed the importance of immediate notification if there are any feelings of suicidality thoughts or behaviors   We discussed the importance of contacting the office if he has any tachycardia or fainting situations. The patient was warned against driving while taking sedation medications. We discussed that sharing medications is a felony. We discussed other side effects such as QT prolongation, risks of Myocardial Infarction (heart attack), stroke and sudden death. We discussed immediate notification if there is any seizure-like activity. We discussed issues with angioedema as an anaphylactic reactions. Patient agrees to provide a Urine Sample for Toxicology purposes at any time requested. I have personally reviewed the [place] (PDMP) website prior to prescribing this medication. The patient understands and agrees to use these medications only as prescribed.    At this point in time, the patient is showing no signs of addiction, abuse, diversion or suicidal ideation. The patient's use has been found to be appropriate without any concerns for misuse by the patient. The patient's current conditions require continued scheduled medication use at this time. Future review for continued appropriate medication use and misuse will continue. All the patient's questions were answered to their satisfaction. [place] Prescription Drug Monitoring Program report was reviewed and was appropriate       This patient has tried both non-drug (yoga, meditation) and drugs that do not contain opioids, barbiturates, benzodiazepines, and stimulants. The requested drug will be used with other drugs and closely monitored.

## 2023-11-29 NOTE — ASSESSMENT & PLAN NOTE
Component  Ref Range & Units 5/31/23  8:38 AM 5/31/22 11:35 AM 11/19/18  1:55 PM   Hemoglobin A1C  6.5 6.0 6.0 High  R 6.1 R      Discussed diet and exercise

## 2023-11-29 NOTE — PROGRESS NOTES
Name: Matilde Hylton      : 1945      MRN: [id number]  Encounter Provider: REGINA Jordan  Encounter Date: 2023   Encounter department: [id number] [facility]     1. Mixed hyperlipidemia  Assessment & Plan:  Continue diet control       2. GERD without esophagitis  Assessment & Plan:  Continue protonix       3. Vitamin D deficiency  Assessment & Plan:  Continue supplement      4. Prediabetes  Assessment & Plan:        Component  Ref Range & Units 23  8:38 AM 22 11:35 AM 18  1:55 PM   Hemoglobin A1C  6.5 6.0 6.0 High  R 6.1 R      Discussed diet and exercise       5. Continuous opioid dependence (HCC)  Assessment & Plan:  Continue tramadol, flexeril, voltaren cream, mobic    Scheduled Medication Review: This patient was educated on side effects, risks of taking this type of medication which include abuse, misuse, dependence, addiction and tolerance. All questions were answered including different dosing levels, increasing and decreasing of of this medication. We discussed their continued open discussions with the PCP in order to make sure that they are on the correct dose. We reviewed the potential side effects of the medications including, but are not limited to, constipation, diarrhea, nausea/upset stomach, drowsiness, fatigue, dizziness, urinary retention, visual changes, insomnia, headaches, nightmares, slowed breathing while sleeping, UTI issues, impaired judgment, addiction issues and the risk of fatal overdose if not taken as prescribed. We discussed the importance of notifying the office/provider immediately of any side effects. We discussed the importance of immediate notification if there are any feelings of suicidality thoughts or behaviors   We discussed the importance of contacting the office if he has any tachycardia or fainting situations.      The patient was warned against driving while taking sedation medications. We discussed that sharing medications is a felony. We discussed other side effects such as QT prolongation, risks of Myocardial Infarction (heart attack), stroke and sudden death. We discussed immediate notification if there is any seizure-like activity. We discussed issues with angioedema as an anaphylactic reactions. Patient agrees to provide a Urine Sample for Toxicology purposes at any time requested. I have personally reviewed the [employer] ([facility]) website prior to prescribing this medication. The patient understands and agrees to use these medications only as prescribed. At this point in time, the patient is showing no signs of addiction, abuse, diversion or suicidal ideation. The patient's use has been found to be appropriate without any concerns for misuse by the patient. The patient's current conditions require continued scheduled medication use at this time. Future review for continued appropriate medication use and misuse will continue. All the patient's questions were answered to their satisfaction. [place] Prescription Drug Monitoring Program report was reviewed and was appropriate       This patient has tried both non-drug (yoga, meditation) and drugs that do not contain opioids, barbiturates, benzodiazepines, and stimulants. The requested drug will be used with other drugs and closely monitored. 6. Dental anomaly  -     amoxicillin (AMOXIL) 500 MG tablet; Take 1 tablet (500 mg total) by mouth 2 (two) times a day for 14 days  -     fluconazole (DIFLUCAN) 200 mg tablet; Take 1 tablet (200 mg total) by mouth daily for 5 days    7. Non-recurrent acute suppurative otitis media of both ears without spontaneous rupture of tympanic membranes  Assessment & Plan:  TM bulging, redness  Will start her on Amoxicillin and diflucan and monitor her     Orders:  -     amoxicillin (AMOXIL) 500 MG tablet;  Take 1 tablet (500 mg total) by mouth 2 (two) times a day for 14 days  -     fluconazole (DIFLUCAN) 200 mg tablet; Take 1 tablet (200 mg total) by mouth daily for 5 days    8. Rib pain  -     meloxicam (Mobic) 7.5 mg tablet; Take 1 tablet (7.5 mg total) by mouth 2 (two) times a day    9. Chronic low back pain, unspecified back pain laterality, unspecified whether sciatica present  -     meloxicam (Mobic) 7.5 mg tablet; Take 1 tablet (7.5 mg total) by mouth 2 (two) times a day      BMI Counseling: Body mass index is 32.73 kg/m². The BMI is above normal. Nutrition recommendations include decreasing portion sizes, encouraging healthy choices of fruits and vegetables, decreasing fast food intake, consuming healthier snacks, limiting drinks that contain sugar, moderation in carbohydrate intake, increasing intake of lean protein, reducing intake of saturated and trans fat and reducing intake of cholesterol. Exercise recommendations include exercising 3-5 times per week. No pharmacotherapy was ordered. Rationale for BMI follow-up plan is due to patient being overweight or obese. Subjective      The patient is here today to discuss her medications and treatment plans. Please continue to the [facility] section of the note for details of today's visit. Review of Systems   Constitutional:  Negative for activity change, chills, fatigue and fever. HENT:  Positive for postnasal drip, sinus pressure and sore throat. Negative for rhinorrhea. Eyes:  Negative for pain. Respiratory:  Negative for cough and shortness of breath. Cardiovascular:  Negative for chest pain, palpitations and leg swelling. Gastrointestinal:  Negative for abdominal pain, constipation, diarrhea, nausea and vomiting. Genitourinary:  Negative for difficulty urinating, flank pain, frequency and urgency. Musculoskeletal:  Negative for gait problem, joint swelling and myalgias. Skin:  Negative for color change.    Neurological:  Negative for dizziness, weakness, light-headedness and headaches. Psychiatric/Behavioral:  Negative for sleep disturbance. The patient is not nervous/anxious. All other systems reviewed and are negative. Current Outpatient Medications on File Prior to Visit   Medication Sig    cholecalciferol (VITAMIN D) 400 units/mL Take 400 Units by mouth daily    COD LIVER OIL W/VIT A & D PO Take 1 capsule by mouth in the morning    Diclofenac Sodium (VOLTAREN) 1 % Apply 2 g topically 4 (four) times a day    hydrochlorothiazide (HYDRODIURIL) 12.5 mg tablet Take 1 tablet (12.5 mg total) by mouth daily    metroNIDAZOLE (METROGEL) 0.75 % vaginal gel Insert 1 application. into the vagina 2 (two) times a day    Multiple Minerals-Vitamins (CALCIUM-MAGNESIUM-ZINC-D3 PO) Take 1 tablet by mouth in the morning    nystatin (MYCOSTATIN) 500,000 units/5 mL suspension Apply 5 mL (500,000 Units total) to the mouth or throat 4 (four) times a day    nystatin-triamcinolone (MYCOLOG-II) ointment Apply 1 application.  topically 2 (two) times a day    olopatadine HCl (PATADAY) 0.2 % opth drops Administer 1 drop to both eyes in the morning    pantoprazole (PROTONIX) 20 mg tablet Take 1 tablet (20 mg total) by mouth daily    [DISCONTINUED] meloxicam (Mobic) 7.5 mg tablet Take 1 tablet (7.5 mg total) by mouth daily    [DISCONTINUED] cyclobenzaprine (FLEXERIL) 5 mg tablet Take 1 tablet (5 mg total) by mouth 3 (three) times a day as needed for muscle spasms (Patient not taking: Reported on 11/30/2023)    [DISCONTINUED] guaiFENesin (MUCINEX) 600 mg 12 hr tablet Take 1 tablet (600 mg total) by mouth every 12 (twelve) hours (Patient not taking: Reported on 3/30/2023)    [DISCONTINUED] hydrOXYzine HCL (ATARAX) 25 mg tablet hydroxyzine HCl 25 mg tablet   TAKE 1 TABLET BY MOUTH EVERY 6 HOURS AS NEEDED FOR ITCHING (Patient not taking: Reported on 11/30/2023)    [DISCONTINUED] Probiotic Product (PROBIOTIC BLEND PO) Take 1 capsule by mouth in the morning SYNBIOTIC (Patient not taking: Reported on 11/30/2023)    [DISCONTINUED] traMADol (Ultram) 50 mg tablet Take 1 tablet (50 mg total) by mouth every 6 (six) hours as needed for moderate pain (Patient not taking: Reported on 11/30/2023)       Objective     /54 (BP Location: Right arm, Patient Position: Sitting, Cuff Size: Large)   Pulse 79   Temp 98.6 °F (37 °C) (Tympanic)   Ht 4' 11.5" (1.511 m)   Wt 74.8 kg (164 lb 12.8 oz)   SpO2 97%   BMI 32.73 kg/m²     Physical Exam  Vitals reviewed. Constitutional:       General: She is awake. Appearance: Normal appearance. She is well-developed and normal weight. HENT:      Head: Normocephalic and atraumatic. Right Ear: Swelling present. Tympanic membrane is erythematous and bulging. Left Ear: Swelling present. Tympanic membrane is erythematous and bulging. Nose: Nose normal.      Mouth/Throat:      Mouth: Mucous membranes are moist.      Pharynx: Posterior oropharyngeal erythema present. Eyes:      Extraocular Movements: Extraocular movements intact. Cardiovascular:      Rate and Rhythm: Normal rate and regular rhythm. Pulses: Normal pulses. Heart sounds: Normal heart sounds. Pulmonary:      Effort: Pulmonary effort is normal.      Breath sounds: Normal breath sounds. Abdominal:      General: Bowel sounds are normal.      Palpations: Abdomen is soft. Musculoskeletal:         General: Normal range of motion. Cervical back: Normal range of motion. Right lower leg: No edema. Left lower leg: No edema. Skin:     General: Skin is warm and dry. Neurological:      Mental Status: She is alert and oriented to person, place, and time. Psychiatric:         Attention and Perception: Attention normal.         Mood and Affect: Mood normal.         Speech: Speech normal.         Behavior: Behavior normal. Behavior is cooperative.        [place] Cough, CRNP

## 2023-11-30 ENCOUNTER — VBI (OUTPATIENT)
Dept: ADMINISTRATIVE | Facility: OTHER | Age: 78
End: 2023-11-30

## 2023-11-30 ENCOUNTER — OFFICE VISIT (OUTPATIENT)
Dept: INTERNAL MEDICINE CLINIC | Facility: CLINIC | Age: 78
End: 2023-11-30
Payer: COMMERCIAL

## 2023-11-30 VITALS
BODY MASS INDEX: 32.35 KG/M2 | SYSTOLIC BLOOD PRESSURE: 128 MMHG | HEART RATE: 79 BPM | TEMPERATURE: 98.6 F | OXYGEN SATURATION: 97 % | WEIGHT: 164.8 LBS | HEIGHT: 60 IN | DIASTOLIC BLOOD PRESSURE: 54 MMHG

## 2023-11-30 DIAGNOSIS — R07.81 RIB PAIN: ICD-10-CM

## 2023-11-30 DIAGNOSIS — F11.20 CONTINUOUS OPIOID DEPENDENCE (HCC): ICD-10-CM

## 2023-11-30 DIAGNOSIS — E55.9 VITAMIN D DEFICIENCY: ICD-10-CM

## 2023-11-30 DIAGNOSIS — M54.50 CHRONIC LOW BACK PAIN, UNSPECIFIED BACK PAIN LATERALITY, UNSPECIFIED WHETHER SCIATICA PRESENT: ICD-10-CM

## 2023-11-30 DIAGNOSIS — K00.9 DENTAL ANOMALY: ICD-10-CM

## 2023-11-30 DIAGNOSIS — E78.2 MIXED HYPERLIPIDEMIA: Primary | ICD-10-CM

## 2023-11-30 DIAGNOSIS — R73.03 PREDIABETES: ICD-10-CM

## 2023-11-30 DIAGNOSIS — H66.003 NON-RECURRENT ACUTE SUPPURATIVE OTITIS MEDIA OF BOTH EARS WITHOUT SPONTANEOUS RUPTURE OF TYMPANIC MEMBRANES: ICD-10-CM

## 2023-11-30 DIAGNOSIS — G89.29 CHRONIC LOW BACK PAIN, UNSPECIFIED BACK PAIN LATERALITY, UNSPECIFIED WHETHER SCIATICA PRESENT: ICD-10-CM

## 2023-11-30 DIAGNOSIS — K21.9 GERD WITHOUT ESOPHAGITIS: ICD-10-CM

## 2023-11-30 PROCEDURE — 99213 OFFICE O/P EST LOW 20 MIN: CPT | Performed by: NURSE PRACTITIONER

## 2023-11-30 RX ORDER — AMOXICILLIN 500 MG/1
500 TABLET, FILM COATED ORAL 2 TIMES DAILY
Qty: 28 TABLET | Refills: 0 | Status: SHIPPED | OUTPATIENT
Start: 2023-11-30 | End: 2023-12-14

## 2023-11-30 RX ORDER — MELOXICAM 7.5 MG/1
7.5 TABLET ORAL 2 TIMES DAILY
Qty: 60 TABLET | Refills: 0 | Status: SHIPPED | OUTPATIENT
Start: 2023-11-30

## 2023-11-30 RX ORDER — FLUCONAZOLE 200 MG/1
200 TABLET ORAL DAILY
Qty: 5 TABLET | Refills: 0 | Status: SHIPPED | OUTPATIENT
Start: 2023-11-30 | End: 2023-12-05

## 2023-11-30 NOTE — PATIENT INSTRUCTIONS
Problem List Items Addressed This Visit          Digestive    GERD without esophagitis     Continue protonix          Dental anomaly    Relevant Medications    amoxicillin (AMOXIL) 500 MG tablet    fluconazole (DIFLUCAN) 200 mg tablet       Nervous and Auditory    Non-recurrent acute suppurative otitis media of both ears without spontaneous rupture of tympanic membranes     TM bulging, redness  Will start her on Amoxicillin and diflucan and monitor her          Relevant Medications    amoxicillin (AMOXIL) 500 MG tablet    fluconazole (DIFLUCAN) 200 mg tablet       Other    Chronic lower back pain    Relevant Medications    meloxicam (Mobic) 7.5 mg tablet    Mixed hyperlipidemia - Primary     Continue diet control          Vitamin D deficiency     Continue supplement         Rib pain    Relevant Medications    meloxicam (Mobic) 7.5 mg tablet    Continuous opioid dependence (HCC)     Continue tramadol, flexeril, voltaren cream, mobic    Scheduled Medication Review: This patient was educated on side effects, risks of taking this type of medication which include abuse, misuse, dependence, addiction and tolerance. All questions were answered including different dosing levels, increasing and decreasing of of this medication. We discussed their continued open discussions with the PCP in order to make sure that they are on the correct dose. We reviewed the potential side effects of the medications including, but are not limited to, constipation, diarrhea, nausea/upset stomach, drowsiness, fatigue, dizziness, urinary retention, visual changes, insomnia, headaches, nightmares, slowed breathing while sleeping, UTI issues, impaired judgment, addiction issues and the risk of fatal overdose if not taken as prescribed. We discussed the importance of notifying the office/provider immediately of any side effects.    We discussed the importance of immediate notification if there are any feelings of suicidality thoughts or behaviors   We discussed the importance of contacting the office if he has any tachycardia or fainting situations. The patient was warned against driving while taking sedation medications. We discussed that sharing medications is a felony. We discussed other side effects such as QT prolongation, risks of Myocardial Infarction (heart attack), stroke and sudden death. We discussed immediate notification if there is any seizure-like activity. We discussed issues with angioedema as an anaphylactic reactions. Patient agrees to provide a Urine Sample for Toxicology purposes at any time requested. I have personally reviewed the [employer] ([facility]) website prior to prescribing this medication. The patient understands and agrees to use these medications only as prescribed. At this point in time, the patient is showing no signs of addiction, abuse, diversion or suicidal ideation. The patient's use has been found to be appropriate without any concerns for misuse by the patient. The patient's current conditions require continued scheduled medication use at this time. Future review for continued appropriate medication use and misuse will continue. All the patient's questions were answered to their satisfaction. [place] Prescription Drug Monitoring Program report was reviewed and was appropriate       This patient has tried both non-drug (yoga, meditation) and drugs that do not contain opioids, barbiturates, benzodiazepines, and stimulants. The requested drug will be used with other drugs and closely monitored.            Prediabetes           Component  Ref Range & Units 5/31/23  8:38 AM 5/31/22 11:35 AM 11/19/18  1:55 PM   Hemoglobin A1C  6.5 6.0 6.0 High  R 6.1 R      Discussed diet and exercise

## 2023-11-30 NOTE — ADDENDUM NOTE
Addended by: David Espitia on: 11/30/2023 02:07 PM     Modules accepted: Orders ED HPI GENERAL MEDICAL PROBLEM





- General


Chief Complaint: Respiratory Problem


Stated Complaint: Cough / Shortness of Breath


Time Seen by Provider: 03/18/20 05:00


Source of Information: Reports: Patient


History Limitations: Reports: No Limitations





- History of Present Illness


INITIAL COMMENTS - FREE TEXT/NARRATIVE: 





Patient comes emergency department today with his wife with concerns of 

shortness of breath and cough.  This patient for about a week has had 

increasing cough and congestion.  He was seen in the clinic on Monday had 

negative influenza test and was told that he had a sinus infection and was 

placed on Zithromax.  Tonight he can barely breathe when he lays down.  He 

tried a MDI inhaler at home without improvement.  He has had a fever at home of 

101.  No shaking chills.  His cough is congested but nonproductive.  It is hard 

for him to lay down.  He has no history of congestive heart failure.  He has no 

pain in his chest palpitations weakness dizziness lightheadedness.  He has not 

been outside of the country traveling and has not been exposed anyone ill.  No 

abdominal pain nausea or vomiting.  No diarrhea.  No ear pain or throat pain.


  ** Chest / Lungs


Pain Score (Numeric/FACES): 5





- Related Data


 Allergies











Allergy/AdvReac Type Severity Reaction Status Date / Time


 


benztropine Allergy  Other Verified 03/18/20 06:46


 


morphine Allergy  Other Verified 03/18/20 06:46


 


Penicillins Allergy  Other Verified 03/18/20 06:46


 


egg white Allergy  Numbness Uncoded 03/18/20 06:46


 


peanuts Allergy  Chest Pain Uncoded 03/18/20 06:46











Home Meds: 


 Home Meds





Aspirin [Ecotrin EC] 1 cap PO DAILY 10/17/14 [History]


Insulin Glarg,Human.Rec.Analog [Lantus] 63 units SUBCUT DAILY 10/17/14 [History]


atorvaSTATin Calcium [Atorvastatin Calcium] 1 tab PO DAILY 10/20/14 [History]


Baclofen 5 mg PO TID 30 Days #45 tablet 10/02/18 [Rx]











Past Medical History


Respiratory History: Reports: Asthma, Sleep Apnea


Gastrointestinal History: Reports: Colon Polyp


Other Genitourinary History: kidney stone hx


Musculoskeletal History: Reports: Back Pain, Chronic


Endocrine/Metabolic History: Reports: Diabetes, Type II


Other Endocrine/Metabolic History: takes insulin


Other Dermatologic History: left eye and lip-skin CA removed





- Infectious Disease History


Infectious Disease History: Reports: Chicken Pox





- Past Surgical History


Other Male  Surgeries/Procedures: partial left kidney removal, 30 years ago


Musculoskeletal Surgical History: Reports: Shoulder Surgery, Other (See Below)


Other Musculoskeletal Surgeries/Procedures:: fusion in 1968, 1969, rods and 

screws 1999, removed and replaced in 2001, 2002 more rodes and screws (Dr. Ying

)- lumbar





Social & Family History





- Caffeine Use


Caffeine Use: Reports: Coffee





ED ROS GENERAL





- Review of Systems


Review Of Systems: Comprehensive ROS is negative, except as noted in HPI.





ED EXAM, GENERAL





- Physical Exam


Exam: See Below


Exam Limited By: No Limitations


General Appearance: Alert, WD/WN, Mild Distress


Eye Exam: Bilateral Eye: EOMI, PERRL


Ears: Normal External Exam, Normal Canal, Normal TMs


Ear Exam: Bilateral Ear: TM normal


Nose: Normal Inspection, Normal Mucosa, No Blood


Throat/Mouth: Normal Inspection, Normal Lips, Normal Teeth, Normal Oropharynx


Head: Atraumatic, Normocephalic


Neck: Normal Inspection, Supple, Non-Tender.  No: Lymphadenopathy (L), 

Lymphadenopathy (R)


Respiratory/Chest: No Accessory Muscle Use, Chest Non-Tender, Decreased Breath 

Sounds ( profoundly throughout. ).  No: Retractions


Cardiovascular: Normal Peripheral Pulses, Regular Rate, Rhythm, Tachycardia


GI/Abdominal: Normal Bowel Sounds, Soft, Non-Tender


Back Exam: Normal Inspection, Full Range of Motion


Extremities: Normal Inspection, Normal Range of Motion, No Pedal Edema, Normal 

Capillary Refill


Neurological: Alert, Oriented, Normal Cognition, No Motor/Sensory Deficits


Psychiatric: Flat Affect


Skin Exam: Warm, Intact, Diaphoretic, Pallor


Lymphatic: No Adenopathy





Course





- Vital Signs


Last Recorded V/S: 


 Last Vital Signs











Temp  37.4 C   03/18/20 05:05


 


Pulse  112 H  03/18/20 05:05


 


Resp  18   03/18/20 05:05


 


BP  173/88 H  03/18/20 05:05


 


Pulse Ox  96   03/18/20 05:05














- Orders/Labs/Meds


Orders: 


 Active Orders 24 hr











 Category Date Time Status


 


 RT Aerosol Therapy [RC] ASDIRECTED Care  03/18/20 05:10 Active


 


 RT Aerosol Therapy [RC] ASDIRECTED Care  03/18/20 06:10 Active


 


 CULTURE BLOOD [BC] Stat Lab  03/18/20 05:24 Received


 


 CULTURE BLOOD [BC] Stat Lab  03/18/20 05:28 Results


 


 Blood Culture x2 Reflex Set [OM.PC] Stat Oth  03/18/20 05:07 Ordered


 


 Peripheral IV Insertion Adult [OM.PC] Stat Oth  03/18/20 05:07 Ordered











Labs: 


 Laboratory Tests











  03/18/20 03/18/20 03/18/20 Range/Units





  05:24 05:24 05:24 


 


WBC  7.1    (4.0-10.0)  x10^3/uL


 


RBC  4.55    (4.5-6.0)  x10^6/uL


 


Hgb  14.5    (14.0-18.0)  g/dL


 


Hct  43.3    (40.0-52.0)  %


 


MCV  95.2 H    (78.0-93.0)  fL


 


MCH  31.9    (26.0-32.0)  pg


 


MCHC  33.5    (32.0-36.0)  g/dL


 


RDW Coeff of Guido  12.8    (10.0-15.0)  %


 


Plt Count  157    (130-400)  x10^3/uL


 


Neut % (Auto)  71.0    (50.0-80.0)  %


 


Lymph % (Auto)  15.1 L    (25.0-50.0)  %


 


Mono % (Auto)  12.0 H    (2.0-11.0)  %


 


Eos % (Auto)  1.5    (0.0-4.0)  %


 


Baso % (Auto)  0.4    (0.2-1.2)  %


 


Sodium   136   (136-145)  mmol/L


 


Potassium   4.0   (3.5-5.1)  mmol/L


 


Chloride   100   ()  mmol/L


 


Carbon Dioxide   24   (21-32)  mmol/L


 


Anion Gap   16.0   (10-20)  mmol/L


 


BUN   7   (7-18)  mg/dL


 


Creatinine   1.0   (0.70-1.30)  mg/dL


 


Est Cr Clr Drug Dosing   TNP   


 


Estimated GFR (MDRD)   > 60   


 


Glucose   186 H   ()  mg/dL


 


Lactic Acid    1.4  (0.4-2.0)  mmol/L


 


Calcium   8.8   (8.5-10.1)  mg/dL


 


Corrected Calcium   9.36   (8.5-10.1)  mg/dL


 


Total Bilirubin   0.4   (0.2-1.0)  mg/dL


 


AST   25   (15-37)  U/L


 


ALT   38   (16-63)  U/L


 


Alkaline Phosphatase   89   ()  U/L


 


C-Reactive Protein   3.8 H   (<=0.9)  mg/dL


 


Total Protein   7.7   (6.4-8.2)  g/dL


 


Albumin   3.3 L   (3.4-5.0)  g/dL


 


Globulin   4.4   


 


Albumin/Globulin Ratio   0.75   











Meds: 


Medications














Discontinued Medications














Generic Name Dose Route Start Last Admin





  Trade Name Freq  PRN Reason Stop Dose Admin


 


Albuterol/Ipratropium  3 ml  03/18/20 05:10  03/18/20 05:20





  Duoneb 3.0-0.5 Mg/3 Ml  NEB  03/18/20 05:11  3 ml





  ONETIME ONE   Administration





     





     





     





     


 


Budesonide  0.5 mg  03/18/20 06:09  03/18/20 06:38





  Pulmicort  NEB  03/18/20 06:10  0.5 mg





  ONETIME ONE   Administration





     





     





     





     


 


Sodium Chloride  10 ml  03/18/20 05:06  





  Saline Flush  FLUSH   





  ASDIRECTED PRN   





  Keep Vein Open   





     





     





     














- Radiology Interpretation


Free Text/Narrative:: 





Chest x-ray per radiology no acute pathology





- Re-Assessments/Exams


Free Text/Narrative Re-Assessment/Exam: 





03/18/20 09:17


Laboratory evaluation is rather unremarkable with a normal white blood cell 

count.  Reactive protein is minimally elevated at 3.8.  His chest x-ray is 

negative for any acute findings per radiology.  He was given a DuoNeb nebulizer 

with quite a bit improvement of his lung sounds and he feels that he can 

breathe much better.  This is really the presentation of not only sinusitis but 

also bronchitis and I think this could be related somewhat to environmental 

allergies as he gets this every year when the snow starts to thaw and continues 

throughout the summer.  Continue the Zithromax at this time although I think 

symptomatic management is can be much more paramount for this patient.  He does 

have an albuterol MDI inhaler at home.  I do not feel that systemic leuko-

corticoids are appropriate for this patient at this time as he is a moderately 

controlled diabetic that responds quite aggressively to steroids and just 

recently got a steroid injection in his cervical spine.  Therefore I will send 

him home with a steroid MDI inhaler to not only help his symptoms but minimize 

his blood sugar variability.  Symptomatic management as well as nasal steroids 

and nasal lavage for sinusitis.  He is comfortable with this plan his questions 

are answered.


03/18/20 09:17








Departure





- Departure


Time of Disposition: 06:26


Disposition: Home, Self-Care 01


Clinical Impression: 


 Bronchitis





Sinusitis


Qualifiers:


 Sinusitis location: unspecified location Chronicity: unspecified Qualified Code

(s): J32.9 - Chronic sinusitis, unspecified








- Discharge Information


Instructions:  Sinusitis, Adult, Easy-to-Read, Acute Bronchitis, Adult, Easy-to-

Read


Referrals: 


Saba Freeman DO [Primary Care Provider] - 


Forms:  ED Department Discharge


Additional Instructions: 


Increase fluids over the next few days. 


Zyrtec OTC daily at this time of year and currently. 


Nasal rinses with a Netti pott twice daily as indications as the zyrtec. 


10 minutes later. 


Fluticasone, 2 sprays each nostril once a day for a week, and then 1 spray each 

nostril once a day until hard frost. 


Continue with the Zithromax as previous. 


Albuterol MDI at home as previous as needed for acute symptoms wheezing 

coughing SOB. 


Fluticasone 2 inhalations twice daily for the next 2 weeks. RX given to the 

patient. 


Return to the ED if new or worsening symptoms. 


Follow up with PCP as already planned next week. 





Sepsis Event Note





- Focused Exam


Vital Signs: 


 Vital Signs











  Temp Pulse Resp BP Pulse Ox


 


 03/18/20 05:05  37.4 C  112 H  18  173/88 H  96











Date Exam was Performed: 03/18/20


Time Exam was Performed: 09:17





- My Orders


Last 24 Hours: 


My Active Orders





03/18/20 05:07


Blood Culture x2 Reflex Set [OM.PC] Stat 


Peripheral IV Insertion Adult [OM.PC] Stat 





03/18/20 05:10


RT Aerosol Therapy [RC] ASDIRECTED 





03/18/20 05:24


CULTURE BLOOD [BC] Stat 





03/18/20 05:28


CULTURE BLOOD [BC] Stat 





03/18/20 06:10


RT Aerosol Therapy [RC] ASDIRECTED 














- Assessment/Plan


Last 24 Hours: 


My Active Orders





03/18/20 05:07


Blood Culture x2 Reflex Set [OM.PC] Stat 


Peripheral IV Insertion Adult [OM.PC] Stat 





03/18/20 05:10


RT Aerosol Therapy [RC] ASDIRECTED 





03/18/20 05:24


CULTURE BLOOD [BC] Stat 





03/18/20 05:28


CULTURE BLOOD [BC] Stat 





03/18/20 06:10


RT Aerosol Therapy [RC] ASDIRECTED 











Assessment:: 





Acute Bronchitis.


Sinusitis acute on chronic. 


Plan: 





Increase fluids over the next few days. 


Zyrtec OTC daily at this time of year and currently. 


Nasal rinses with a Netti pott twice daily as indications as the zyrtec. 


10 minutes later. 


Fluticasone, 2 sprays each nostril once a day for a week, and then 1 spray each 

nostril once a day until hard frost. 


Continue with the Zithromax as previous from the clinic. 


Albuterol MDI at home as previous as needed for acute symptoms wheezing 

coughing SOB. 


Fluticasone 2 inhalations twice daily for the next 2 weeks. RX given to the 

patient. 


Return to the ED if new or worsening symptoms. 


Follow up with PCP as already planned next week.

## 2023-11-30 NOTE — TELEPHONE ENCOUNTER
11/30/23 9:21 AM    Patient contacted (spoke with patient) to bring Advance Directive, POLST, or Living Will document to next scheduled pcp visit. Thank you.   Carlos Saba MA  [facility]

## 2023-12-29 ENCOUNTER — TELEPHONE (OUTPATIENT)
Dept: INTERNAL MEDICINE CLINIC | Facility: CLINIC | Age: 78
End: 2023-12-29

## 2023-12-29 DIAGNOSIS — G89.29 CHRONIC LOW BACK PAIN, UNSPECIFIED BACK PAIN LATERALITY, UNSPECIFIED WHETHER SCIATICA PRESENT: ICD-10-CM

## 2023-12-29 DIAGNOSIS — R07.81 RIB PAIN: ICD-10-CM

## 2023-12-29 DIAGNOSIS — J06.9 ACUTE URI: Primary | ICD-10-CM

## 2023-12-29 DIAGNOSIS — M54.50 CHRONIC LOW BACK PAIN, UNSPECIFIED BACK PAIN LATERALITY, UNSPECIFIED WHETHER SCIATICA PRESENT: ICD-10-CM

## 2023-12-29 RX ORDER — FLUCONAZOLE 200 MG/1
200 TABLET ORAL DAILY
Qty: 5 TABLET | Refills: 0 | Status: SHIPPED | OUTPATIENT
Start: 2023-12-29 | End: 2024-01-03

## 2023-12-29 RX ORDER — AMOXICILLIN AND CLAVULANATE POTASSIUM 875; 125 MG/1; MG/1
1 TABLET, FILM COATED ORAL EVERY 12 HOURS SCHEDULED
Qty: 20 TABLET | Refills: 0 | Status: SHIPPED | OUTPATIENT
Start: 2023-12-29 | End: 2024-01-08

## 2023-12-29 RX ORDER — MELOXICAM 7.5 MG/1
7.5 TABLET ORAL 2 TIMES DAILY
Qty: 60 TABLET | Refills: 0 | Status: SHIPPED | OUTPATIENT
Start: 2023-12-29

## 2023-12-29 NOTE — TELEPHONE ENCOUNTER
"Patient Amelia left a voice message \"I have a cold and my ear popped and there's some bleeding and I need to talk to somebody as soon as possible please. My date of birth is [birth year]. I can be reached at this number [phone] . I'll be waiting for the call.   Thank you  "

## 2024-01-19 ENCOUNTER — TELEPHONE (OUTPATIENT)
Dept: INTERNAL MEDICINE CLINIC | Facility: CLINIC | Age: 79
End: 2024-01-19

## 2024-01-19 NOTE — TELEPHONE ENCOUNTER
Patient left this voicemail please advise       Hi, this message is for Parve[facility]. Clarissa. Parveen, I need to up prescription for fluconazole. Fluconazole. [place], not Dozo, Dozo. I ran out and I'm still sick and I don't know what else to do. I still have this very bad cough and I still have a I'm still taking the amoxicillin, but I'm running out. Also, please give me a call I can be reached at [phone]. My birthday is [birth year]. Please call me back, it's important. I've been like this for over 2 months, over a month and 1/2 for actually I reinfected myself. I don't know how, but where or but I have this terrible cough. Please call me back.

## 2024-01-20 DIAGNOSIS — J18.9 PNEUMONIA OF BOTH LOWER LOBES DUE TO INFECTIOUS ORGANISM: Primary | ICD-10-CM

## 2024-01-20 RX ORDER — BENZONATATE 200 MG/1
200 CAPSULE ORAL 3 TIMES DAILY PRN
Qty: 90 CAPSULE | Refills: 0 | Status: SHIPPED | OUTPATIENT
Start: 2024-01-20

## 2024-01-20 RX ORDER — FLUCONAZOLE 200 MG/1
200 TABLET ORAL DAILY
Qty: 5 TABLET | Refills: 0 | Status: SHIPPED | OUTPATIENT
Start: 2024-01-20 | End: 2024-01-25

## 2024-01-20 RX ORDER — ALBUTEROL SULFATE 2.5 MG/3ML
2.5 SOLUTION RESPIRATORY (INHALATION) EVERY 6 HOURS PRN
Qty: 360 ML | Refills: 0 | Status: SHIPPED | OUTPATIENT
Start: 2024-01-20

## 2024-01-20 RX ORDER — METHYLPREDNISOLONE 4 MG/1
TABLET ORAL
Qty: 21 EACH | Refills: 0 | Status: SHIPPED | OUTPATIENT
Start: 2024-01-20

## 2024-01-20 RX ORDER — LEVOFLOXACIN 500 MG/1
500 TABLET, FILM COATED ORAL EVERY 24 HOURS
Qty: 7 TABLET | Refills: 0 | Status: SHIPPED | OUTPATIENT
Start: 2024-01-20 | End: 2024-01-27

## 2024-01-23 ENCOUNTER — TELEPHONE (OUTPATIENT)
Dept: INTERNAL MEDICINE CLINIC | Facility: CLINIC | Age: 79
End: 2024-01-23

## 2024-01-23 NOTE — TELEPHONE ENCOUNTER
Hi, this is Pavel Jensen. The message is for N[facility] Coleman. They didn't. They have the machine, but I can't afford it They say that Medicare won't pay for it and I have a an inhaler. I'm wondering if I could put the medicine in there and do it from there. So please call me back as soon as possible because I need the nebulizer but I can't afford it. It's too much, that one $9099 and I have to pick it up. I have no one to pick it up for me, so please call me back as soon as possible. Again, this is from Parveen. Thank you.

## 2024-01-29 PROBLEM — H66.003 NON-RECURRENT ACUTE SUPPURATIVE OTITIS MEDIA OF BOTH EARS WITHOUT SPONTANEOUS RUPTURE OF TYMPANIC MEMBRANES: Status: RESOLVED | Noted: 2023-11-30 | Resolved: 2024-01-29

## 2024-01-29 NOTE — TELEPHONE ENCOUNTER
I didn't want the patient to take the albuterol back. I wanted her to stop the medication for now. Can you call the patient and let her know this.

## 2024-01-29 NOTE — TELEPHONE ENCOUNTER
Krystal from [facility] called to get some clarification. She states she has a message from this patient for her albuterol stating that she cannot take it and her provider told her to bring the albuterol back.     Krystal said once a patient picks up a medication, they cannot bring it back. She also wanted to clarify if you are sending in an alternative medication or if there was an interaction? She just wasn't sure what this message was about from the patient. Would you be familiar with this? Let me know and I will call Krystal back.

## 2024-01-30 NOTE — TELEPHONE ENCOUNTER
Patient called back stating this medication made her itchy. I let her know Randee wanted her to stop the medication and that she will not be able to bring it back to the pharmacy.     I called Krystal from [facility] and let her know this. She said it was making her itchy so Randee told her to stop taking it but did not want her to bring it back to the pharmacy. Krystal said she told the patient she could just bring the medication back and she will damage it out. She just needed a reason.

## 2024-02-21 PROBLEM — J18.9 PNEUMONIA OF BOTH LOWER LOBES DUE TO INFECTIOUS ORGANISM: Status: RESOLVED | Noted: 2024-01-20 | Resolved: 2024-02-21

## 2024-02-29 ENCOUNTER — TELEPHONE (OUTPATIENT)
Dept: INTERNAL MEDICINE CLINIC | Facility: CLINIC | Age: 79
End: 2024-02-29

## 2024-02-29 NOTE — TELEPHONE ENCOUNTER
Patient LM stating that she needs a call back ASAP I called her and left a voicemail for her to call our office back

## 2024-03-01 ENCOUNTER — NURSE TRIAGE (OUTPATIENT)
Dept: OTHER | Facility: OTHER | Age: 79
End: 2024-03-01

## 2024-03-01 ENCOUNTER — HOSPITAL ENCOUNTER (EMERGENCY)
Facility: HOSPITAL | Age: 79
Discharge: HOME | End: 2024-03-01
Attending: EMERGENCY MEDICINE
Payer: COMMERCIAL

## 2024-03-01 VITALS
SYSTOLIC BLOOD PRESSURE: 158 MMHG | DIASTOLIC BLOOD PRESSURE: 66 MMHG | HEART RATE: 75 BPM | RESPIRATION RATE: 20 BRPM | TEMPERATURE: 98.4 F | OXYGEN SATURATION: 98 %

## 2024-03-01 DIAGNOSIS — H92.02 POSTERIOR AURICULAR PAIN OF LEFT EAR: ICD-10-CM

## 2024-03-01 DIAGNOSIS — M26.622 ARTHRALGIA OF LEFT TEMPOROMANDIBULAR JOINT: Primary | ICD-10-CM

## 2024-03-01 PROCEDURE — 99283 EMERGENCY DEPT VISIT LOW MDM: CPT

## 2024-03-01 PROCEDURE — 93005 ELECTROCARDIOGRAM TRACING: CPT

## 2024-03-01 PROCEDURE — 96372 THER/PROPH/DIAG INJ SC/IM: CPT

## 2024-03-01 PROCEDURE — 99284 EMERGENCY DEPT VISIT MOD MDM: CPT | Performed by: EMERGENCY MEDICINE

## 2024-03-01 RX ORDER — KETOROLAC TROMETHAMINE 30 MG/ML
15 INJECTION, SOLUTION INTRAMUSCULAR; INTRAVENOUS ONCE
Status: COMPLETED | OUTPATIENT
Start: 2024-03-01 | End: 2024-03-01

## 2024-03-01 RX ORDER — KETOROLAC TROMETHAMINE 30 MG/ML
15 INJECTION, SOLUTION INTRAMUSCULAR; INTRAVENOUS ONCE
Status: DISCONTINUED | OUTPATIENT
Start: 2024-03-01 | End: 2024-03-01

## 2024-03-01 RX ORDER — ACETAMINOPHEN 325 MG/1
975 TABLET ORAL ONCE
Status: COMPLETED | OUTPATIENT
Start: 2024-03-01 | End: 2024-03-01

## 2024-03-01 RX ADMIN — ACETAMINOPHEN 975 MG: 325 TABLET ORAL at 09:45

## 2024-03-01 RX ADMIN — KETOROLAC TROMETHAMINE 15 MG: 30 INJECTION, SOLUTION INTRAMUSCULAR; INTRAVENOUS at 09:45

## 2024-03-01 NOTE — TELEPHONE ENCOUNTER
"Reason for Disposition   [1] SEVERE pain AND [2] not improved 2 hours after pain medicine    Answer Assessment - Initial Assessment Questions  1. ONSET: \"When did the pain start?\"      Two weeks ago    2. LOCATION: \"Where is the pain located?\"      Pain in left jaw that goes into the shoulder, left arm     3. PAIN: \"How bad is the pain?\" (Scale 1-10; or mild, moderate, severe)    - MILD (1-3): doesn't interfere with normal activities    - MODERATE (4-7): interferes with normal activities (e.g., work or school) or awakens from sleep    - SEVERE (8-10): excruciating pain, unable to do any normal activities, unable to move arm at all due to pain      Severe    4. WORK OR EXERCISE: \"Has there been any recent work or exercise that involved this part of the body?\"      Denies    5. CAUSE: \"What do you think is causing the shoulder pain?\"      Pain behind jaw, goes into left shoulder and arm; pain goes behind ear and into jaw    6. OTHER SYMPTOMS: \"Do you have any other symptoms?\" (e.g., neck pain, swelling, rash, fever, numbness, weakness)     Denies chest pain, shortness of breath, fever, rash, swelling.    Patient taking Meloxicam, but it doesn't help.    Protocols used: Shoulder Pain-ADULT-AH    "

## 2024-03-01 NOTE — ED PROVIDER NOTES
"History  Chief Complaint   Patient presents with    Jaw Pain     Pt presents to ER from home after contacting nurse triage for reports L jaw pain x 3 months denies relief with meloxicam. Also has ear pain and a \"sensation that something is pulling on the left side of my head.\"      Patient is a 78-year-old female presents for evaluation of left jaw/left posterior ear pain.  Patient says the symptoms have been present for a while.  Patient says that she had a viral infection about 3 months ago and had some coughing and sneezing.  She says that she thinks she popped her TM at that time.  She says that she had some mild bleeding that quickly went away.  She denies any bleeding since then.  Patient says that for the past week she has had some pain to the angle of the jaw and behind her left ear.  She denies any redness or significant swelling to the area.  She denies any change in hearing.  She denies any fevers, chills.  The patient also says that she has been having some left shoulder discomfort which she attributes to her rheumatoid arthritis.  Denies any chest pain, shortness of breath, lightheadedness, dizziness.  She says she takes meloxicam for the symptoms which helps \"somewhat.\"  She says that she came in today because last night she started with a \"hair pulling sensation\" on the left side of her head.  Denies any headache, visual changes.        Prior to Admission Medications   Prescriptions Last Dose Informant Patient Reported? Taking?   COD LIVER OIL W/VIT A & D PO  Self Yes No   Sig: Take 1 capsule by mouth in the morning   Diclofenac Sodium (VOLTAREN) 1 %  Self No No   Sig: Apply 2 g topically 4 (four) times a day   Multiple Minerals-Vitamins (CALCIUM-MAGNESIUM-ZINC-D3 PO)  Self Yes No   Sig: Take 1 tablet by mouth in the morning   albuterol (2.5 mg/3 mL) 0.083 % nebulizer solution   No No   Sig: Take 3 mL (2.5 mg total) by nebulization every 6 (six) hours as needed for wheezing or shortness of breath "   benzonatate (TESSALON) 200 MG capsule   No No   Sig: Take 1 capsule (200 mg total) by mouth 3 (three) times a day as needed for cough   cholecalciferol (VITAMIN D) 400 units/mL  Self Yes No   Sig: Take 400 Units by mouth daily   hydrochlorothiazide (HYDRODIURIL) 12.5 mg tablet  Self No No   Sig: Take 1 tablet (12.5 mg total) by mouth daily   meloxicam (Mobic) 7.5 mg tablet   No No   Sig: Take 1 tablet (7.5 mg total) by mouth 2 (two) times a day   methylPREDNISolone 4 MG tablet therapy pack   No No   Sig: Use as directed on package   metroNIDAZOLE (METROGEL) 0.75 % vaginal gel   No No   Sig: Insert 1 application. into the vagina 2 (two) times a day   nystatin (MYCOSTATIN) 500,000 units/5 mL suspension   No No   Sig: Apply 5 mL (500,000 Units total) to the mouth or throat 4 (four) times a day   nystatin-triamcinolone (MYCOLOG-II) ointment   No No   Sig: Apply 1 application. topically 2 (two) times a day   olopatadine HCl (PATADAY) 0.2 % opth drops   No No   Sig: Administer 1 drop to both eyes in the morning   pantoprazole (PROTONIX) 20 mg tablet  Self No No   Sig: Take 1 tablet (20 mg total) by mouth daily      Facility-Administered Medications: None       Past Medical History:   Diagnosis Date    Abnormal electrocardiogram     last assessed 1/8/16    Chronic kidney disease     Kidney stone     Pneumonia of both lower lobes due to infectious organism 1/20/2024    Primary osteoarthritis involving multiple joints 11/7/2019       Past Surgical History:   Procedure Laterality Date    EYE SURGERY      TONSILLECTOMY      TOOTH EXTRACTION      TUBAL LIGATION         Family History   Problem Relation Age of Onset    Other Mother         sheehans syndrome    Aneurysm Father     Nephrolithiasis Father     Hypertension Sister     Cirrhosis Brother         alcoholic liver    No Known Problems Daughter     No Known Problems Sister     No Known Problems Daughter     No Known Problems Daughter     Other Daughter     No Known  Problems Maternal Aunt     No Known Problems Maternal Grandmother     No Known Problems Paternal Grandmother      I have reviewed and agree with the history as documented.    E-Cigarette/Vaping    E-Cigarette Use Never User      E-Cigarette/Vaping Substances    Nicotine No     THC No     CBD No     Flavoring No     Other No     Unknown No      Social History     Tobacco Use    Smoking status: Former    Smokeless tobacco: Never   Vaping Use    Vaping status: Never Used   Substance Use Topics    Alcohol use: Yes     Comment: occasional    Drug use: No       Review of Systems   Constitutional:  Negative for fever and unexpected weight change.   HENT:  Negative for congestion, ear pain, sore throat and trouble swallowing.         Left jaw pain     Eyes:  Negative for pain and redness.   Respiratory:  Negative for cough, chest tightness and shortness of breath.    Cardiovascular:  Negative for chest pain and leg swelling.   Gastrointestinal:  Negative for abdominal distention, abdominal pain, diarrhea and vomiting.   Endocrine: Negative for polyuria.   Genitourinary:  Negative for dysuria, hematuria, pelvic pain and vaginal bleeding.   Musculoskeletal:  Negative for back pain and myalgias.   Skin:  Negative for rash.   Neurological:  Negative for dizziness, syncope, weakness, light-headedness and headaches.       Physical Exam  Physical Exam  Vitals and nursing note reviewed.   Constitutional:       General: She is not in acute distress.     Appearance: She is well-developed.   HENT:      Head: Normocephalic and atraumatic.        Comments: No redness or swelling to the areas of tenderness  Pain with opening and closing of her jaw     Right Ear: External ear normal. Tympanic membrane is not perforated or erythematous.      Left Ear: External ear normal. Tympanic membrane is scarred. Tympanic membrane is not perforated or erythematous.      Nose: Nose normal.      Mouth/Throat:      Mouth: Mucous membranes are moist.       Pharynx: No oropharyngeal exudate.   Eyes:      Conjunctiva/sclera: Conjunctivae normal.      Pupils: Pupils are equal, round, and reactive to light.   Cardiovascular:      Rate and Rhythm: Normal rate and regular rhythm.      Heart sounds: Normal heart sounds. No murmur heard.     No friction rub. No gallop.   Pulmonary:      Effort: Pulmonary effort is normal. No respiratory distress.      Breath sounds: Normal breath sounds. No wheezing or rales.   Abdominal:      General: There is no distension.      Palpations: Abdomen is soft.      Tenderness: There is no abdominal tenderness. There is no guarding.   Musculoskeletal:         General: No swelling, tenderness or deformity. Normal range of motion.      Cervical back: Normal range of motion and neck supple.   Lymphadenopathy:      Cervical: No cervical adenopathy.   Skin:     General: Skin is warm and dry.   Neurological:      General: No focal deficit present.      Mental Status: She is alert and oriented to person, place, and time. Mental status is at baseline.      Cranial Nerves: No cranial nerve deficit.      Sensory: No sensory deficit.      Motor: No weakness or abnormal muscle tone.      Coordination: Coordination normal.         Vital Signs  ED Triage Vitals   Temperature Pulse Respirations Blood Pressure SpO2   03/01/24 0857 03/01/24 0854 03/01/24 0854 03/01/24 0854 03/01/24 0854   98.4 °F (36.9 °C) 96 20 (!) 198/94 98 %      Temp Source Heart Rate Source Patient Position - Orthostatic VS BP Location FiO2 (%)   03/01/24 0857 03/01/24 0854 03/01/24 0854 03/01/24 0854 --   Tympanic Monitor Sitting Left arm       Pain Score       03/01/24 0854       9           Vitals:    03/01/24 0854 03/01/24 0952   BP: (!) 198/94 158/66   Pulse: 96    Patient Position - Orthostatic VS: Sitting          Visual Acuity      ED Medications  Medications   acetaminophen (TYLENOL) tablet 975 mg (975 mg Oral Given 3/1/24 0945)   ketorolac (TORADOL) injection 15 mg (15 mg  Intramuscular Given 3/1/24 0945)       Diagnostic Studies  Results Reviewed       None                   No orders to display              Procedures  Procedures         ED Course                               SBIRT 20yo+      Flowsheet Row Most Recent Value   Initial Alcohol Screen: US AUDIT-C     1. How often do you have a drink containing alcohol? 0 Filed at: 03/01/2024 0857   2. How many drinks containing alcohol do you have on a typical day you are drinking?  0 Filed at: 03/01/2024 0857   3b. FEMALE Any Age, or MALE 65+: How often do you have 4 or more drinks on one occassion? 0 Filed at: 03/01/2024 0857   Audit-C Score 0 Filed at: 03/01/2024 0857                      Medical Decision Making  70-year-old female presenting for evaluation of left jaw, posterior ear pain.  Believes she had a perforated TM about 3 months ago.  Denies any decrease in hearing.  Has had the symptoms for the past week.  Had a hair pulling sensation in the left side of her head starting last night.  Denies any chest pain, shortness of breath, headaches, visual changes.  No fevers or chills.  On the left looks scarred.  No signs of active infection.  Has point tenderness to the TMJ/angle of the jaw and posterior ear.  Believe the symptoms are musculoskeletal in nature.  No signs of infction.  Treat with Toradol and Tylenol.  Will give ENT referral.  EKG shows normal sinus rhythm with a right bundle branch block, no ischemic changes.  Have low suspicion for ACS at this time given the chronicity of the symptoms and no chest pain the point tenderness of the jaw and shoulder  Patient with improvement of symptoms after medications.    Risk  OTC drugs.  Prescription drug management.             Disposition  Final diagnoses:   Arthralgia of left temporomandibular joint   Posterior auricular pain of left ear     Time reflects when diagnosis was documented in both MDM as applicable and the Disposition within this note       Time User Action Codes  Description Comment    3/1/2024  9:51 AM Babar Day [M26.622] Arthralgia of left temporomandibular joint     3/1/2024  9:51 AM Babar Day [H92.02] Posterior auricular pain of left ear           ED Disposition       ED Disposition   Discharge    Condition   Stable    Date/Time   Fri Mar 1, 2024 0951    Comment   Arnel Jensen discharge to home/self care.                   Follow-up Information       Follow up With Specialties Details Why Contact Info Additional Information    [facility] Schedule an appointment as soon as possible for a visit  For follow up of symptoms [address] [place] [id number] [phone] [facility], [address] [place] [id number]            Patient's Medications   Discharge Prescriptions    No medications on file           PDMP Review         Value Time User    PDMP Reviewed  Yes 9/20/2023  5:48 AM REGINA Meza            ED Provider  Electronically Signed by             Babar Day DO  03/01/24 0952

## 2024-03-02 ENCOUNTER — HOSPITAL ENCOUNTER (EMERGENCY)
Facility: HOSPITAL | Age: 79
Discharge: HOME | End: 2024-03-02
Attending: FAMILY MEDICINE
Payer: COMMERCIAL

## 2024-03-02 ENCOUNTER — APPOINTMENT (EMERGENCY)
Dept: CT IMAGING | Facility: HOSPITAL | Age: 79
End: 2024-03-02
Payer: COMMERCIAL

## 2024-03-02 VITALS
OXYGEN SATURATION: 97 % | RESPIRATION RATE: 20 BRPM | SYSTOLIC BLOOD PRESSURE: 130 MMHG | HEART RATE: 96 BPM | DIASTOLIC BLOOD PRESSURE: 60 MMHG

## 2024-03-02 DIAGNOSIS — R51.9 HEADACHE: Primary | ICD-10-CM

## 2024-03-02 LAB
ATRIAL RATE: 77 BPM
P AXIS: 57 DEGREES
PR INTERVAL: 154 MS
QRS AXIS: -25 DEGREES
QRSD INTERVAL: 130 MS
QT INTERVAL: 396 MS
QTC INTERVAL: 448 MS
T WAVE AXIS: 28 DEGREES
VENTRICULAR RATE: 77 BPM

## 2024-03-02 PROCEDURE — 93010 ELECTROCARDIOGRAM REPORT: CPT | Performed by: INTERNAL MEDICINE

## 2024-03-02 PROCEDURE — 99284 EMERGENCY DEPT VISIT MOD MDM: CPT

## 2024-03-02 PROCEDURE — 70450 CT HEAD/BRAIN W/O DYE: CPT

## 2024-03-02 NOTE — ED PROVIDER NOTES
History  Chief Complaint   Patient presents with    Headache     Pt presents to ER for re-eval - was here yesterday for L sided jaw and neck pain L sided head pain, now worsening. Denies relief with meloxicam, flexeril.      HPI  78-year-old female presented with complaint of headache.  Patient states she was seen in the emergency department yesterday for left-sided jaw and neck pain was diagnosed with TMJ.  Patient states she has been having good days pain for over 3 months initially started with the dental infection which has not been treated.  Patient states she was sick for 2 months has not been able to see her dentist as well as PCP.  Patient says she does have an appointment next week however states that the pain is better and her job but the headache started yesterday.  States that she feels pain in her scalp when touching it it did not start sensitive to touch.  Denies any trauma or injury.  Denies numbness or tingling  Prior to Admission Medications   Prescriptions Last Dose Informant Patient Reported? Taking?   COD LIVER OIL W/VIT A & D PO  Self Yes No   Sig: Take 1 capsule by mouth in the morning   Diclofenac Sodium (VOLTAREN) 1 %  Self No No   Sig: Apply 2 g topically 4 (four) times a day   Multiple Minerals-Vitamins (CALCIUM-MAGNESIUM-ZINC-D3 PO)  Self Yes No   Sig: Take 1 tablet by mouth in the morning   albuterol (2.5 mg/3 mL) 0.083 % nebulizer solution   No No   Sig: Take 3 mL (2.5 mg total) by nebulization every 6 (six) hours as needed for wheezing or shortness of breath   benzonatate (TESSALON) 200 MG capsule   No No   Sig: Take 1 capsule (200 mg total) by mouth 3 (three) times a day as needed for cough   cholecalciferol (VITAMIN D) 400 units/mL  Self Yes No   Sig: Take 400 Units by mouth daily   hydrochlorothiazide (HYDRODIURIL) 12.5 mg tablet  Self No No   Sig: Take 1 tablet (12.5 mg total) by mouth daily   meloxicam (Mobic) 7.5 mg tablet   No No   Sig: Take 1 tablet (7.5 mg total) by mouth 2  (two) times a day   methylPREDNISolone 4 MG tablet therapy pack   No No   Sig: Use as directed on package   metroNIDAZOLE (METROGEL) 0.75 % vaginal gel   No No   Sig: Insert 1 application. into the vagina 2 (two) times a day   nystatin (MYCOSTATIN) 500,000 units/5 mL suspension   No No   Sig: Apply 5 mL (500,000 Units total) to the mouth or throat 4 (four) times a day   nystatin-triamcinolone (MYCOLOG-II) ointment   No No   Sig: Apply 1 application. topically 2 (two) times a day   olopatadine HCl (PATADAY) 0.2 % opth drops   No No   Sig: Administer 1 drop to both eyes in the morning   pantoprazole (PROTONIX) 20 mg tablet  Self No No   Sig: Take 1 tablet (20 mg total) by mouth daily      Facility-Administered Medications: None       Past Medical History:   Diagnosis Date    Abnormal electrocardiogram     last assessed 1/8/16    Chronic kidney disease     Kidney stone     Pneumonia of both lower lobes due to infectious organism 1/20/2024    Primary osteoarthritis involving multiple joints 11/7/2019       Past Surgical History:   Procedure Laterality Date    EYE SURGERY      TONSILLECTOMY      TOOTH EXTRACTION      TUBAL LIGATION         Family History   Problem Relation Age of Onset    Other Mother         sheehans syndrome    Aneurysm Father     Nephrolithiasis Father     Hypertension Sister     Cirrhosis Brother         alcoholic liver    No Known Problems Daughter     No Known Problems Sister     No Known Problems Daughter     No Known Problems Daughter     Other Daughter     No Known Problems Maternal Aunt     No Known Problems Maternal Grandmother     No Known Problems Paternal Grandmother      I have reviewed and agree with the history as documented.    E-Cigarette/Vaping    E-Cigarette Use Never User      E-Cigarette/Vaping Substances    Nicotine No     THC No     CBD No     Flavoring No     Other No     Unknown No      Social History     Tobacco Use    Smoking status: Former    Smokeless tobacco: Never    Vaping Use    Vaping status: Never Used   Substance Use Topics    Alcohol use: Yes     Comment: occasional    Drug use: No       Review of Systems   Constitutional:  Negative for chills and fever.   HENT:  Negative for rhinorrhea and sore throat.    Eyes:  Negative for visual disturbance.   Respiratory:  Negative for cough and shortness of breath.    Cardiovascular:  Negative for chest pain and leg swelling.   Gastrointestinal:  Negative for abdominal pain, diarrhea, nausea and vomiting.   Genitourinary:  Negative for dysuria.   Musculoskeletal:  Negative for back pain and myalgias.   Skin:  Negative for rash.   Neurological:  Positive for headaches. Negative for dizziness.   Psychiatric/Behavioral:  Negative for confusion.    All other systems reviewed and are negative.      Physical Exam  Physical Exam  Vitals and nursing note reviewed.   Constitutional:       Appearance: She is well-developed.   HENT:      Head: Normocephalic and atraumatic.      Comments: Scalp is sensitive to touch.  No rash noted there is no lesion noted there is no swelling or redness noted.     Right Ear: External ear normal.      Left Ear: External ear normal.      Nose: Nose normal.      Mouth/Throat:      Mouth: Mucous membranes are moist.      Pharynx: No oropharyngeal exudate.   Eyes:      General: No scleral icterus.        Right eye: No discharge.         Left eye: No discharge.      Conjunctiva/sclera: Conjunctivae normal.      Pupils: Pupils are equal, round, and reactive to light.   Cardiovascular:      Rate and Rhythm: Normal rate and regular rhythm.      Pulses: Normal pulses.      Heart sounds: Normal heart sounds.   Pulmonary:      Effort: Pulmonary effort is normal. No respiratory distress.      Breath sounds: Normal breath sounds. No wheezing.   Abdominal:      General: Bowel sounds are normal.      Palpations: Abdomen is soft.   Musculoskeletal:         General: Normal range of motion.      Cervical back: Normal range of  motion and neck supple.   Lymphadenopathy:      Cervical: No cervical adenopathy.   Skin:     General: Skin is warm and dry.      Capillary Refill: Capillary refill takes less than 2 seconds.   Neurological:      General: No focal deficit present.      Mental Status: She is alert and oriented to person, place, and time.   Psychiatric:         Mood and Affect: Mood normal.         Behavior: Behavior normal.         Vital Signs  ED Triage Vitals [03/02/24 1328]   Temp Pulse Respirations Blood Pressure SpO2   -- 96 20 139/64 99 %      Temp src Heart Rate Source Patient Position - Orthostatic VS BP Location FiO2 (%)   -- Monitor Lying Left arm --      Pain Score       --           Vitals:    03/02/24 1328   BP: 139/64   Pulse: 96   Patient Position - Orthostatic VS: Lying         Visual Acuity      ED Medications  Medications - No data to display    Diagnostic Studies  Results Reviewed       None                   CT head wo contrast   Final Result by Jonathan Loving MD (03/02 1424)      No acute intracranial abnormality.                  Workstation performed: [id number]                    Procedures  Procedures         ED Course                               SBIRT 22yo+      Flowsheet Row Most Recent Value   Initial Alcohol Screen: US AUDIT-C     1. How often do you have a drink containing alcohol? 0 Filed at: 03/02/2024 1325   2. How many drinks containing alcohol do you have on a typical day you are drinking?  0 Filed at: 03/02/2024 1325   3b. FEMALE Any Age, or MALE 65+: How often do you have 4 or more drinks on one occassion? 0 Filed at: 03/02/2024 1325   Audit-C Score 0 Filed at: 03/02/2024 1325   CHRISTOPHER: How many times in the past year have you...    Used an illegal drug or used a prescription medication for non-medical reasons? Never Filed at: 03/02/2024 1325                      Medical Decision Making  78-year-old female presented with complaint of headache.  Patient states she was seen in the  emergency department yesterday for left-sided jaw and neck pain was diagnosed with TMJ.  Patient states she has been having good days pain for over 3 months initially started with the dental infection which has not been treated.  Patient states she was sick for 2 months has not been able to see her dentist as well as PCP.  Patient says she does have an appointment next week however states that the pain is better and her job but the headache started yesterday.  States that she feels pain in her scalp when touching it it did not start sensitive to touch.  Denies any trauma or injury.  Denies numbness or tingling.  History taken from patient.  Differential diagnoses include shingle/hypersensitive TMJ/migraine  Plan will obtain CT head and patient offered pain medication which she refused states that she just wants to make sure that she is not having any issues and requesting for CT head.  CT reviewed within normal limits this was discussed with patient recommending following up with the PCP  Disposition discharge home with strict precaution to return to the ED if symptoms does not improve or worsen take Tylenol or Profen as needed for pain.  Patient verbalized understand the plan discharge home.    Amount and/or Complexity of Data Reviewed  Radiology: ordered.             Disposition  Final diagnoses:   Headache     Time reflects when diagnosis was documented in both MDM as applicable and the Disposition within this note       Time User Action Codes Description Comment    3/2/2024  2:20 PM Javier Rogers Add [R51.9] Headache           ED Disposition       ED Disposition   Discharge    Condition   Stable    Date/Time   Sat Mar 2, 2024 1420    Comment   Arnel Jensen discharge to home/self care.                   Follow-up Information       Follow up With Specialties Details Why Contact Info    [facility], Nurse Practitioner Schedule an appointment as soon as possible for a visit in 2  days If symptoms worsen [address]   [address]  [facility] [address]  [phone]              Current Discharge Medication List        CONTINUE these medications which have NOT CHANGED    Details   albuterol (2.5 mg/3 mL) 0.083 % nebulizer solution Take 3 mL (2.5 mg total) by nebulization every 6 (six) hours as needed for wheezing or shortness of breath  Qty: 360 mL, Refills: 0    Associated Diagnoses: Pneumonia of both lower lobes due to infectious organism      benzonatate (TESSALON) 200 MG capsule Take 1 capsule (200 mg total) by mouth 3 (three) times a day as needed for cough  Qty: 90 capsule, Refills: 0    Associated Diagnoses: Pneumonia of both lower lobes due to infectious organism      cholecalciferol (VITAMIN D) 400 units/mL Take 400 Units by mouth daily      COD LIVER OIL W/VIT A & D PO Take 1 capsule by mouth in the morning      Diclofenac Sodium (VOLTAREN) 1 % Apply 2 g topically 4 (four) times a day  Qty: 100 g, Refills: 3    Associated Diagnoses: Primary osteoarthritis involving multiple joints; Chronic low back pain, unspecified back pain laterality, unspecified whether sciatica present      hydrochlorothiazide (HYDRODIURIL) 12.5 mg tablet Take 1 tablet (12.5 mg total) by mouth daily  Qty: 90 tablet, Refills: 0    Associated Diagnoses: Bilateral lower extremity edema      meloxicam (Mobic) 7.5 mg tablet Take 1 tablet (7.5 mg total) by mouth 2 (two) times a day  Qty: 60 tablet, Refills: 0    Associated Diagnoses: Rib pain; Chronic low back pain, unspecified back pain laterality, unspecified whether sciatica present      methylPREDNISolone 4 MG tablet therapy pack Use as directed on package  Qty: 21 each, Refills: 0    Associated Diagnoses: Pneumonia of both lower lobes due to infectious organism      metroNIDAZOLE (METROGEL) 0.75 % vaginal gel Insert 1 application. into the vagina 2 (two) times a day  Qty: 70 g, Refills: 3    Associated Diagnoses: Vaginal candidiasis      Multiple Minerals-Vitamins  (CALCIUM-MAGNESIUM-ZINC-D3 PO) Take 1 tablet by mouth in the morning      nystatin (MYCOSTATIN) 500,000 units/5 mL suspension Apply 5 mL (500,000 Units total) to the mouth or throat 4 (four) times a day  Qty: 473 mL, Refills: 1    Associated Diagnoses: Tongue edema      nystatin-triamcinolone (MYCOLOG-II) ointment Apply 1 application. topically 2 (two) times a day  Qty: 60 g, Refills: 3    Associated Diagnoses: Vaginal candidiasis      olopatadine HCl (PATADAY) 0.2 % opth drops Administer 1 drop to both eyes in the morning  Qty: 10 mL, Refills: 1    Associated Diagnoses: Chronic dryness of both eyes      pantoprazole (PROTONIX) 20 mg tablet Take 1 tablet (20 mg total) by mouth daily  Qty: 90 tablet, Refills: 3    Associated Diagnoses: GERD without esophagitis             No discharge procedures on file.    PDMP Review         Value Time User    PDMP Reviewed  Yes 9/20/2023  5:48 AM REGINA Meza            ED Provider  Electronically Signed by             Javier Rogers MD  03/02/24 3110

## 2024-03-04 ENCOUNTER — NURSE TRIAGE (OUTPATIENT)
Dept: OTHER | Facility: OTHER | Age: 79
End: 2024-03-04

## 2024-03-04 ENCOUNTER — OFFICE VISIT (OUTPATIENT)
Dept: INTERNAL MEDICINE CLINIC | Facility: CLINIC | Age: 79
End: 2024-03-04
Payer: COMMERCIAL

## 2024-03-04 VITALS
OXYGEN SATURATION: 96 % | SYSTOLIC BLOOD PRESSURE: 140 MMHG | BODY MASS INDEX: 33.09 KG/M2 | TEMPERATURE: 101.2 F | HEIGHT: 59 IN | HEART RATE: 114 BPM | WEIGHT: 164.13 LBS | DIASTOLIC BLOOD PRESSURE: 80 MMHG

## 2024-03-04 DIAGNOSIS — R73.03 PREDIABETES: ICD-10-CM

## 2024-03-04 DIAGNOSIS — E78.2 MIXED HYPERLIPIDEMIA: ICD-10-CM

## 2024-03-04 DIAGNOSIS — B02.8 HERPES ZOSTER WITH COMPLICATION: Primary | ICD-10-CM

## 2024-03-04 DIAGNOSIS — E55.9 VITAMIN D DEFICIENCY: ICD-10-CM

## 2024-03-04 DIAGNOSIS — Z12.31 ENCOUNTER FOR SCREENING MAMMOGRAM FOR BREAST CANCER: ICD-10-CM

## 2024-03-04 DIAGNOSIS — R21 RASH IN ADULT: ICD-10-CM

## 2024-03-04 PROBLEM — B02.9 SHINGLES RASH: Status: ACTIVE | Noted: 2024-03-04

## 2024-03-04 PROCEDURE — 96372 THER/PROPH/DIAG INJ SC/IM: CPT | Performed by: NURSE PRACTITIONER

## 2024-03-04 PROCEDURE — 99213 OFFICE O/P EST LOW 20 MIN: CPT | Performed by: NURSE PRACTITIONER

## 2024-03-04 RX ORDER — TRIAMCINOLONE ACETONIDE 40 MG/ML
40 INJECTION, SUSPENSION INTRA-ARTICULAR; INTRAMUSCULAR ONCE
Status: COMPLETED | OUTPATIENT
Start: 2024-03-04 | End: 2024-03-04

## 2024-03-04 RX ORDER — KETOROLAC TROMETHAMINE 30 MG/ML
30 INJECTION, SOLUTION INTRAMUSCULAR; INTRAVENOUS ONCE
Status: COMPLETED | OUTPATIENT
Start: 2024-03-04 | End: 2024-03-04

## 2024-03-04 RX ORDER — VALACYCLOVIR HYDROCHLORIDE 1 G/1
1000 TABLET, FILM COATED ORAL 2 TIMES DAILY
Qty: 28 TABLET | Refills: 0 | Status: SHIPPED | OUTPATIENT
Start: 2024-03-04 | End: 2024-03-18

## 2024-03-04 RX ORDER — OXYCODONE HYDROCHLORIDE AND ACETAMINOPHEN 5; 325 MG/1; MG/1
1 TABLET ORAL EVERY 4 HOURS PRN
Qty: 120 TABLET | Refills: 0 | Status: SHIPPED | OUTPATIENT
Start: 2024-03-04

## 2024-03-04 RX ADMIN — TRIAMCINOLONE ACETONIDE 40 MG: 40 INJECTION, SUSPENSION INTRA-ARTICULAR; INTRAMUSCULAR at 11:02

## 2024-03-04 RX ADMIN — KETOROLAC TROMETHAMINE 30 MG: 30 INJECTION, SOLUTION INTRAMUSCULAR; INTRAVENOUS at 11:01

## 2024-03-04 NOTE — TELEPHONE ENCOUNTER
"Regarding: shingles/ pain  ----- Message from Chani Naranjo sent at 3/4/2024  6:37 AM EST -----  \"I would like to speak to someone in regards to my shingles. I refused the shot and I am in a lot of pain. I need to be seen.\"    "

## 2024-03-04 NOTE — PROGRESS NOTES
Name: Arnel Jensen      : 1945      MRN: [id number]  Encounter Provider: REGINA Meza  Encounter Date: 3/4/2024   Encounter department: [facility]    Assessment & Plan     1. Herpes zoster with complication  Assessment & Plan:  Located in her head  Will start her on valtrex and percocet  Will give her kenalog and toradol injections    Orders:  -     CBC and Platelet; Future; Expected date: 2024  -     Comprehensive metabolic panel; Future; Expected date: 2024  -     valACYclovir (VALTREX) 1,000 mg tablet; Take 1 tablet (1,000 mg total) by mouth 2 (two) times a day for 14 days  -     oxyCODONE-acetaminophen (Percocet) 5-325 mg per tablet; Take 1 tablet by mouth every 4 (four) hours as needed for moderate pain Max Daily Amount: 6 tablets    2. Encounter for screening mammogram for breast cancer  -     Mammo screening bilateral w 3d & cad; Future; Expected date: 2024    3. Rash in adult  Assessment & Plan:  Head - rash is located on left side   Small patches appear to be shingles      4. Mixed hyperlipidemia  -     Lipid panel; Future; Expected date: 2024    5. Vitamin D deficiency  -     Vitamin D 25 hydroxy; Future; Expected date: 2024    6. Prediabetes  -     Hemoglobin A1C; Future; Expected date: 2024  -     ketorolac (TORADOL) 60 mg/2 mL IM injection 30 mg  -     triamcinolone acetonide (KENALOG-40) 40 mg/mL injection 40 mg           Subjective      The patient is here today to discuss her rash that appears to be shingles. Please continue to the PORCH section of the note for details of today's visit.        Review of Systems   Skin:  Positive for rash.       Current Outpatient Medications on File Prior to Visit   Medication Sig    cholecalciferol (VITAMIN D) 400 units/mL Take 400 Units by mouth daily    COD LIVER OIL W/VIT A & D PO Take 1 capsule by mouth in the morning    Diclofenac Sodium (VOLTAREN) 1 % Apply 2 g topically 4  "(four) times a day    hydrochlorothiazide (HYDRODIURIL) 12.5 mg tablet Take 1 tablet (12.5 mg total) by mouth daily    meloxicam (Mobic) 7.5 mg tablet Take 1 tablet (7.5 mg total) by mouth 2 (two) times a day    metroNIDAZOLE (METROGEL) 0.75 % vaginal gel Insert 1 application. into the vagina 2 (two) times a day    Multiple Minerals-Vitamins (CALCIUM-MAGNESIUM-ZINC-D3 PO) Take 1 tablet by mouth in the morning    nystatin-triamcinolone (MYCOLOG-II) ointment Apply 1 application. topically 2 (two) times a day    olopatadine HCl (PATADAY) 0.2 % opth drops Administer 1 drop to both eyes in the morning    pantoprazole (PROTONIX) 20 mg tablet Take 1 tablet (20 mg total) by mouth daily    [DISCONTINUED] albuterol (2.5 mg/3 mL) 0.083 % nebulizer solution Take 3 mL (2.5 mg total) by nebulization every 6 (six) hours as needed for wheezing or shortness of breath (Patient not taking: Reported on 3/4/2024)    [DISCONTINUED] benzonatate (TESSALON) 200 MG capsule Take 1 capsule (200 mg total) by mouth 3 (three) times a day as needed for cough (Patient not taking: Reported on 3/4/2024)    [DISCONTINUED] methylPREDNISolone 4 MG tablet therapy pack Use as directed on package (Patient not taking: Reported on 3/4/2024)    [DISCONTINUED] nystatin (MYCOSTATIN) 500,000 units/5 mL suspension Apply 5 mL (500,000 Units total) to the mouth or throat 4 (four) times a day (Patient not taking: Reported on 3/4/2024)       Objective     /80 (BP Location: Right arm, Patient Position: Sitting)   Pulse (!) 114   Temp (!) 101.2 °F (38.4 °C)   Ht 4' 11\" (1.499 m)   Wt 74.4 kg (164 lb 2 oz)   SpO2 96%   BMI 33.15 kg/m²     Physical Exam  HENT:      Head:         REGINA Meza    "

## 2024-03-04 NOTE — ASSESSMENT & PLAN NOTE
Located in her head  Will start her on valtrex and percocet  Will give her kenalog and toradol injections

## 2024-03-04 NOTE — PATIENT INSTRUCTIONS
Shingles   AMBULATORY CARE:   Shingles  is a viral infection that causes a painful rash. Shingles is caused by the varicella-zoster virus. This is the same virus that causes chickenpox. The virus stays in your body after you have chickenpox, without causing any symptoms. Shingles occurs when the virus becomes active again. The active virus travels along a nerve to your skin and causes a rash. The rash usually lasts 2 to 3 weeks. Most people have shingles one time, but it is possible to develop it again.  Common signs and symptoms:  Shingles can appear anywhere on your body, but it is most common on your torso. A line of painful blisters develops on the left or right side of your torso. The rash starts as red dots that become blisters filled with fluid. The blisters usually grow bigger, become filled with pus, and then crust over after a few days. You may also have any of the following:  Severe tiredness and muscle weakness    Pain when your skin is lightly touched    Headache    Fever    Eye pain when exposed to light       Call your local emergency number (911 in the ) if:   You have trouble moving your arms, legs, or face.    You become confused, or have trouble speaking.    You have a seizure.    Seek care immediately if:   You have weakness in an arm or leg.    You have dizziness, a severe headache, or hearing or vision loss.    You have painful, red, warm skin around the blisters, or the blisters drain pus.    Your neck is stiff or you have trouble moving it.    Call your doctor if:   A painful rash appears near your eye.    The rash spreads to more areas and your pain worsens.    You feel weak or have a headache.    You have a cough, chills, or a fever.    You have abdominal pain or nausea, or you are vomiting.    You have questions or concerns about your condition or care.    Treatment:  Shingles cannot be cured. The following medicines can decrease your pain and help prevent complications:  Antiviral  medicine  fights the virus causing your shingles. Start this medicine within 3 days after you notice the first symptoms. This may help prevent nerve pain. A shingles outbreak can cause nerve pain called post-herpetic neuralgia (PHN). PHN can last a long time after you heal from shingles.    Topical anesthetics  are used to numb the skin and decrease pain. They can be a cream, gel, spray, or patch.    Anticonvulsants and antidepressants  decrease nerve pain and may help you sleep at night.    Antihistamines  may help decrease itching.    Acetaminophen  decreases pain and fever. It is available without a doctor's order. Ask how much to take and how often to take it. Follow directions. Read the labels of all other medicines you are using to see if they also contain acetaminophen, or ask your doctor or pharmacist. Acetaminophen can cause liver damage if not taken correctly.    NSAIDs , such as ibuprofen, help decrease swelling, pain, and fever. This medicine is available with or without a doctor's order. NSAIDs can cause stomach bleeding or kidney problems in certain people. If you take blood thinner medicine, always ask your healthcare provider if NSAIDs are safe for you. Always read the medicine label and follow directions.    A steroid and numbing medicine injection  may decrease severe pain that does not get better with other medicines.    Self-care:   Apply a cool, wet compress  or take a cool bath. This may help decrease itching and pain.    Keep your rash clean and dry.  Cover your rash with a bandage. Do not use bandages with adhesive. Clothes may irritate your skin.    Prevent the spread of the shingles virus:  The virus can be passed to a person who has never had chickenpox. This usually happens if the other person comes in contact with your open sores. This person may get chickenpox, but not shingles. You are contagious until your blisters scab over. Stay away from people who have not had chickenpox or the  chickenpox vaccine. Avoid pregnant women, newborns, and people with weak immune systems. They have a higher risk of infection.  Wash your hands often.  Wash your hands several times each day. Wash after you use the bathroom, change a child's diaper, and before you prepare or eat food. Use soap and water every time. Rub your soapy hands together, lacing your fingers. Wash the front and back of your hands, and in between your fingers. Use the fingers of one hand to scrub under the fingernails of the other hand. Wash for at least 20 seconds. Rinse with warm, running water for several seconds. Then dry your hands with a clean towel or paper towel. Use hand  that contains alcohol if soap and water are not available. Do not touch your eyes, nose, or mouth without washing your hands first.         Cover a sneeze or cough.  Use a tissue that covers your mouth and nose. Throw the tissue away in a trash can right away. Use the bend of your arm if a tissue is not available. Wash your hands well with soap and water or use a hand .       Prevent shingles or another shingles outbreak:   A vaccine may be given to help prevent shingles. You can get the vaccine even if you already had shingles. The vaccine comes in 2 forms. A 2-dose vaccine is usually given to adults 50 years or older. A 1-dose vaccine may be given to adults 60 years or older.    The vaccine can help prevent a future outbreak. If you do get shingles again, the vaccine can keep it from becoming severe. Ask your healthcare provider about other vaccines you may need.    Follow up with your doctor as directed:  Write down your questions so you remember to ask them during your visits.  For more information:   Centers for Disease Control and Prevention  [address] [place] [address]  Phone: 8- [phone] - 9154992  Phone: 8- 872 - 7358899  Web Address: http://www.cdc.gov    © Copyright Merative 2023 Information is for End User's use only and may not be  sold, redistributed or otherwise used for commercial purposes.  The above information is an  only. It is not intended as medical advice for individual conditions or treatments. Talk to your doctor, nurse or pharmacist before following any medical regimen to see if it is safe and effective for you.

## 2024-03-07 ENCOUNTER — OFFICE VISIT (OUTPATIENT)
Dept: INTERNAL MEDICINE CLINIC | Facility: CLINIC | Age: 79
End: 2024-03-07
Payer: COMMERCIAL

## 2024-03-07 ENCOUNTER — NURSE TRIAGE (OUTPATIENT)
Dept: OTHER | Facility: OTHER | Age: 79
End: 2024-03-07

## 2024-03-07 VITALS
HEART RATE: 68 BPM | HEIGHT: 59 IN | BODY MASS INDEX: 32.05 KG/M2 | TEMPERATURE: 100.2 F | WEIGHT: 159 LBS | OXYGEN SATURATION: 99 % | SYSTOLIC BLOOD PRESSURE: 130 MMHG | DIASTOLIC BLOOD PRESSURE: 74 MMHG

## 2024-03-07 DIAGNOSIS — B02.8 HERPES ZOSTER WITH COMPLICATION: ICD-10-CM

## 2024-03-07 DIAGNOSIS — Z23 ENCOUNTER FOR IMMUNIZATION: Primary | ICD-10-CM

## 2024-03-07 DIAGNOSIS — K00.9 DENTAL ANOMALY: ICD-10-CM

## 2024-03-07 PROCEDURE — 99213 OFFICE O/P EST LOW 20 MIN: CPT | Performed by: NURSE PRACTITIONER

## 2024-03-07 PROCEDURE — G2211 COMPLEX E/M VISIT ADD ON: HCPCS | Performed by: NURSE PRACTITIONER

## 2024-03-07 NOTE — PATIENT INSTRUCTIONS
Shingles:   The rash often lasts 7-10 days.   Once the rash has developed crusts, which can take 2-4 weeks, the person is no longer contagious.   A person is not infectious before blisters appear or if pain persists after the rash is gone (post-herpetic neuralgia).   Immunocompromised people may experience a longer duration of symptoms.

## 2024-03-07 NOTE — PROGRESS NOTES
Name: Arnel Jensen      : 1945      MRN: [id number]  Encounter Provider: REGINA Meza  Encounter Date: 3/7/2024   Encounter department: [facility]    Assessment & Plan     1. Encounter for immunization  Assessment & Plan:  Patient offered and deferred vaccinations.      Orders:  -     Pneumococcal Conjugate Vaccine 20-valent (Pcv20)    2. Herpes zoster with complication  Assessment & Plan:  3/4/2024  Located in her head  Will start her on valtrex and percocet  Will give her kenalog and toradol injections    3/7/2024  Left side of her face is swollen   She is continuing her valtrex  She is occasionally taking the percocet          3. Dental anomaly  Assessment & Plan:  Right side of face with swelling  Her dentist did order an antibiotic              Subjective      The patient is here today to discuss her continued shingles pain. Please continue to the PORCH section of the note for details of today's visit.        Review of Systems   Constitutional:  Positive for fatigue.   Gastrointestinal:  Positive for nausea.   Musculoskeletal:  Positive for arthralgias, myalgias and neck pain.   Skin:  Positive for rash.   Neurological:  Positive for weakness.       Current Outpatient Medications on File Prior to Visit   Medication Sig    cholecalciferol (VITAMIN D) 400 units/mL Take 400 Units by mouth daily    COD LIVER OIL W/VIT A & D PO Take 1 capsule by mouth in the morning    Diclofenac Sodium (VOLTAREN) 1 % Apply 2 g topically 4 (four) times a day    hydrochlorothiazide (HYDRODIURIL) 12.5 mg tablet Take 1 tablet (12.5 mg total) by mouth daily    meloxicam (Mobic) 7.5 mg tablet Take 1 tablet (7.5 mg total) by mouth 2 (two) times a day    metroNIDAZOLE (METROGEL) 0.75 % vaginal gel Insert 1 application. into the vagina 2 (two) times a day    Multiple Minerals-Vitamins (CALCIUM-MAGNESIUM-ZINC-D3 PO) Take 1 tablet by mouth in the morning    nystatin-triamcinolone (MYCOLOG-II)  "ointment Apply 1 application. topically 2 (two) times a day    olopatadine HCl (PATADAY) 0.2 % opth drops Administer 1 drop to both eyes in the morning    oxyCODONE-acetaminophen (Percocet) 5-325 mg per tablet Take 1 tablet by mouth every 4 (four) hours as needed for moderate pain Max Daily Amount: 6 tablets    pantoprazole (PROTONIX) 20 mg tablet Take 1 tablet (20 mg total) by mouth daily    valACYclovir (VALTREX) 1,000 mg tablet Take 1 tablet (1,000 mg total) by mouth 2 (two) times a day for 14 days       Objective     /74   Pulse 68   Temp 100.2 °F (37.9 °C)   Ht 4' 11\" (1.499 m)   Wt 72.1 kg (159 lb)   SpO2 99%   BMI 32.11 kg/m²     Physical Exam  HENT:      Head:     Neck:     Abdominal:      Comments: Nauseated after taking percocet   Musculoskeletal:      Comments: Generalized body weakness and fatigue        REGINA Meza    "

## 2024-03-07 NOTE — TELEPHONE ENCOUNTER
"Reason for Disposition   SEVERE swelling of the entire face    Answer Assessment - Initial Assessment Questions  1. ONSET: \"When did the swelling start?\" (e.g., minutes, hours, days)      2 days ago (patient reports that it started on one side but now is on both sides, and that she has a dental infection on the right side of her upper and lower jaws).  She was also diagnosed with shingles on the left side of her scalp on 3/4/2024 and was prescribed acyclovir and Percocet which she started that day.    2. LOCATION: \"What part of the face is swollen?\"      Both sides of face from eyes to chin    3. SEVERITY: \"How swollen is it?\"      Severe per pt    4. ITCHING: \"Is there any itching?\" If Yes, ask: \"How much?\"   (Scale 1-10; mild, moderate or severe)      Left side of head is itchy due to shingles    5. PAIN: \"Is the swelling painful to touch?\" If Yes, ask: \"How painful is it?\"   (Scale 1-10; mild, moderate or severe)       Right side of face is painful (severe), both sides of head painful (mild)    6. FEVER: \"Do you have a fever?\" If Yes, ask: \"What is it, how was it measured, and when did it start?\"       \"I don't really know but I feel hot\"    7. CAUSE: \"What do you think is causing the face swelling?\"      Dental infection vs shingles    8. RECURRENT SYMPTOM: \"Have you had face swelling before?\" If Yes, ask: \"When was the last time?\" \"What happened that time?\"      Denies     9. OTHER SYMPTOMS: \"Do you have any other symptoms?\" (e.g., toothache, leg swelling)       Left eye swelling (has shingles on left side of head), also has a dental infection on right upper and lower jaw, dizziness    Discussed protocol disposition with patient (see healthcare provider within 4 hours).  Instead of going to the ER at this time, patient requested an appointment in the morning with her PCP.  Earliest appointment with PCP scheduled at 10:40 AM.      ER precautions reviewed.  Home care advice provided.  Patient verbalized " understanding and was appreciative.    Protocols used: Face Swelling-ADULT-AH

## 2024-03-07 NOTE — ASSESSMENT & PLAN NOTE
3/4/2024  Located in her head  Will start her on valtrex and percocet  Will give her kenalog and toradol injections    3/7/2024  Left side of her face is swollen   She is continuing her valtrex  She is occasionally taking the percocet

## 2024-03-14 ENCOUNTER — TELEPHONE (OUTPATIENT)
Dept: INTERNAL MEDICINE CLINIC | Facility: CLINIC | Age: 79
End: 2024-03-14

## 2024-03-14 NOTE — TELEPHONE ENCOUNTER
Pt states her shingles is now hard/pasty. Its no longer sticky. She wants to know when she can wash her hair and if there is a certain shampoo she should use? She is almost finished with her medication.

## 2024-03-18 ENCOUNTER — TELEPHONE (OUTPATIENT)
Dept: INTERNAL MEDICINE CLINIC | Facility: CLINIC | Age: 79
End: 2024-03-18

## 2024-03-18 NOTE — TELEPHONE ENCOUNTER
Patient called states that she did wash her hair and she would like to know if there is any type of hair oil she can use for her scalp. She is not getting any relief and is having a hard time. Please advise

## 2024-04-05 ENCOUNTER — TELEPHONE (OUTPATIENT)
Dept: INTERNAL MEDICINE CLINIC | Facility: CLINIC | Age: 79
End: 2024-04-05

## 2024-04-05 DIAGNOSIS — R21 RASH IN ADULT: ICD-10-CM

## 2024-04-05 DIAGNOSIS — B02.8 HERPES ZOSTER WITH COMPLICATION: Primary | ICD-10-CM

## 2024-04-05 RX ORDER — MELOXICAM 7.5 MG/1
7.5 TABLET ORAL DAILY
Qty: 30 TABLET | Refills: 2 | Status: SHIPPED | OUTPATIENT
Start: 2024-04-05

## 2024-04-05 RX ORDER — GABAPENTIN 100 MG/1
100 CAPSULE ORAL 3 TIMES DAILY
Qty: 90 CAPSULE | Refills: 2 | Status: SHIPPED | OUTPATIENT
Start: 2024-04-05

## 2024-04-05 RX ORDER — CYCLOBENZAPRINE HCL 5 MG
5 TABLET ORAL 3 TIMES DAILY PRN
Qty: 90 TABLET | Refills: 2 | Status: SHIPPED | OUTPATIENT
Start: 2024-04-05

## 2024-04-05 NOTE — TELEPHONE ENCOUNTER
Patient is requesting Meloxicam 7.5 mg  cyclobenzaprine (Flexeril) 5 mg refills.     Patient wanted to know if you can prescribe Gabapentin, to help with the discomfort of Shingles.

## 2024-04-19 ENCOUNTER — TELEPHONE (OUTPATIENT)
Dept: INTERNAL MEDICINE CLINIC | Facility: CLINIC | Age: 79
End: 2024-04-19

## 2024-04-19 NOTE — TELEPHONE ENCOUNTER
This message is for Clarissa Coleman. This is Bao Jensen 1945, phone number [phone]. I'm still in a lot of pain My head is splitting with the shingles have there's no more blisters, but the after effect is worse. I need something for the pain. My head I've been like this for. It's going to be. It's going on a month and a half now and I just can't take the headaches anymore. I need help. I need something and also I need a new prescription for the Meloxicam. Thank you. Please call me back. Christopher.

## 2024-04-22 DIAGNOSIS — R21 RASH IN ADULT: ICD-10-CM

## 2024-04-22 DIAGNOSIS — B02.8 HERPES ZOSTER WITH COMPLICATION: Primary | ICD-10-CM

## 2024-04-22 DIAGNOSIS — B02.8 HERPES ZOSTER WITH COMPLICATION: ICD-10-CM

## 2024-04-22 RX ORDER — MELOXICAM 7.5 MG/1
7.5 TABLET ORAL DAILY
Qty: 30 TABLET | Refills: 2 | Status: SHIPPED | OUTPATIENT
Start: 2024-04-22

## 2024-04-22 RX ORDER — OXYCODONE HYDROCHLORIDE AND ACETAMINOPHEN 5; 325 MG/1; MG/1
1 TABLET ORAL EVERY 8 HOURS PRN
Qty: 90 TABLET | Refills: 0 | Status: SHIPPED | OUTPATIENT
Start: 2024-04-22

## 2024-04-22 NOTE — TELEPHONE ENCOUNTER
I ordered her the percocet. Remind her to take it with food, it can make her feel sick to her stomach. TY

## 2024-05-09 ENCOUNTER — TELEPHONE (OUTPATIENT)
Age: 79
End: 2024-05-09

## 2024-05-09 NOTE — TELEPHONE ENCOUNTER
Patient is still experiencing the effects of the shingles on the left side of the scalp - soreness, burning, itching. Says she is losing her tastebuds. She has tried everything she could over the counter, but nothing helps.  Please advise patient what she can do.

## 2024-05-10 DIAGNOSIS — B02.9 HERPES ZOSTER WITHOUT COMPLICATION: Primary | ICD-10-CM

## 2024-05-10 RX ORDER — VALACYCLOVIR HYDROCHLORIDE 1 G/1
1000 TABLET, FILM COATED ORAL 2 TIMES DAILY
Qty: 40 TABLET | Refills: 0 | Status: SHIPPED | OUTPATIENT
Start: 2024-05-10 | End: 2024-05-30

## 2024-05-10 NOTE — TELEPHONE ENCOUNTER
Let her know that I sent another round of the antiviral medication for her. This could be the type that lasts a few months. Tell her that her tastebuds will come back.

## 2024-05-10 NOTE — TELEPHONE ENCOUNTER
Patient returned call, I relayed message from Randee. Confirmation that medication was sent to correct pharmacy. Patient expressed understanding.

## 2024-05-29 ENCOUNTER — VBI (OUTPATIENT)
Dept: ADMINISTRATIVE | Facility: OTHER | Age: 79
End: 2024-05-29

## 2024-05-29 NOTE — TELEPHONE ENCOUNTER
05/29/24 11:11 AM    Patient contacted to bring Advance Directive, POLST, or Living Will document to next scheduled pcp visit.[facility] Department left message.    Thank you.  Radha Garza  [facility]

## 2024-07-30 DIAGNOSIS — B37.31 VAGINAL CANDIDIASIS: ICD-10-CM

## 2024-07-30 DIAGNOSIS — B02.8 HERPES ZOSTER WITH COMPLICATION: ICD-10-CM

## 2024-07-30 DIAGNOSIS — R21 RASH IN ADULT: ICD-10-CM

## 2024-07-31 RX ORDER — NYSTATIN AND TRIAMCINOLONE ACETONIDE 100000; 1 [USP'U]/G; MG/G
1 OINTMENT TOPICAL 2 TIMES DAILY
Qty: 60 G | Refills: 3 | Status: SHIPPED | OUTPATIENT
Start: 2024-07-31

## 2024-07-31 RX ORDER — MELOXICAM 7.5 MG/1
7.5 TABLET ORAL DAILY
Qty: 30 TABLET | Refills: 0 | Status: SHIPPED | OUTPATIENT
Start: 2024-07-31

## 2024-08-13 ENCOUNTER — OFFICE VISIT (OUTPATIENT)
Dept: INTERNAL MEDICINE CLINIC | Facility: CLINIC | Age: 79
End: 2024-08-13
Payer: COMMERCIAL

## 2024-08-13 VITALS
OXYGEN SATURATION: 98 % | HEIGHT: 59 IN | SYSTOLIC BLOOD PRESSURE: 112 MMHG | TEMPERATURE: 97.4 F | HEART RATE: 67 BPM | WEIGHT: 158.4 LBS | DIASTOLIC BLOOD PRESSURE: 78 MMHG | BODY MASS INDEX: 31.93 KG/M2

## 2024-08-13 DIAGNOSIS — B02.29 POST HERPETIC NEURALGIA: ICD-10-CM

## 2024-08-13 DIAGNOSIS — B37.31 VAGINAL CANDIDIASIS: ICD-10-CM

## 2024-08-13 DIAGNOSIS — Z23 ENCOUNTER FOR IMMUNIZATION: Primary | ICD-10-CM

## 2024-08-13 DIAGNOSIS — F11.20 CONTINUOUS OPIOID DEPENDENCE (HCC): ICD-10-CM

## 2024-08-13 DIAGNOSIS — R60.0 PERIPHERAL EDEMA: ICD-10-CM

## 2024-08-13 PROBLEM — B02.9 HERPES ZOSTER WITHOUT COMPLICATION: Status: RESOLVED | Noted: 2024-05-10 | Resolved: 2024-08-13

## 2024-08-13 PROBLEM — B02.8 HERPES ZOSTER WITH COMPLICATION: Status: RESOLVED | Noted: 2024-03-04 | Resolved: 2024-08-13

## 2024-08-13 PROCEDURE — G2211 COMPLEX E/M VISIT ADD ON: HCPCS | Performed by: INTERNAL MEDICINE

## 2024-08-13 PROCEDURE — 99214 OFFICE O/P EST MOD 30 MIN: CPT | Performed by: INTERNAL MEDICINE

## 2024-08-13 RX ORDER — HYDROCHLOROTHIAZIDE 12.5 MG/1
12.5 TABLET ORAL DAILY
Qty: 30 TABLET | Refills: 5 | Status: SHIPPED | OUTPATIENT
Start: 2024-08-13 | End: 2025-02-09

## 2024-08-13 RX ORDER — FLUCONAZOLE 150 MG/1
150 TABLET ORAL DAILY
Qty: 10 TABLET | Refills: 0 | Status: SHIPPED | OUTPATIENT
Start: 2024-08-13 | End: 2024-08-23

## 2024-08-13 NOTE — PROGRESS NOTES
"Ambulatory Visit  Name: Arnel Jensen      : 1945      MRN: [id number]  Encounter Provider: Juan M Nettles DO  Encounter Date: 2024   Encounter department: [facility]      Assessment & Plan  Encounter for immunization  Declined  Orders:    Pneumococcal Conjugate Vaccine 20-valent (Pcv20)    Vaginal candidiasis  Diflucan provided for episodic vaginitis  Orders:    fluconazole (DIFLUCAN) 150 mg tablet; Take 1 tablet (150 mg total) by mouth daily for 10 doses    Peripheral edema  Chronic edema of the LLE present for years per the patient  Trial of a small dose of a diuretic   Orders:    hydroCHLOROthiazide 12.5 mg tablet; Take 1 tablet (12.5 mg total) by mouth daily    Continuous opioid dependence (HCC)         Post herpetic neuralgia  Gabapentin 100 mg TID with possible titration PRN  Discussed pain management referral and the patient would prefer to use medications at this time.            History of Present Illness     The patient was seen and examined and noted to have issues shingles in the left parietal occipital area.  The patient states that this was noted in march of this year.  She went to the ER and was given Valacyclovir.  Subsequently she was given Gabapentin 100 mg TID for post herpetic neuralgia which she notes that she takes sporadically.  The patient notes pain extending from the posterior parietal area to the occiput.  She notes chronic edema of the LLE.  This has been present for several year and previously used diuretics for this.      Review of Systems  Objective     /78   Pulse 67   Temp (!) 97.4 °F (36.3 °C)   Ht 4' 11\" (1.499 m)   Wt 71.8 kg (158 lb 6.4 oz)   SpO2 98%   BMI 31.99 kg/m²     Physical Exam    Juan M Nettles DO     "

## 2024-08-13 NOTE — ASSESSMENT & PLAN NOTE
Gabapentin 100 mg TID with possible titration PRN  Discussed pain management referral and the patient would prefer to use medications at this time.

## 2024-08-21 DIAGNOSIS — R21 RASH IN ADULT: ICD-10-CM

## 2024-08-21 DIAGNOSIS — B02.8 HERPES ZOSTER WITH COMPLICATION: ICD-10-CM

## 2024-08-22 RX ORDER — GABAPENTIN 100 MG/1
100 CAPSULE ORAL 3 TIMES DAILY
Qty: 90 CAPSULE | Refills: 0 | Status: SHIPPED | OUTPATIENT
Start: 2024-08-22

## 2024-08-22 RX ORDER — MELOXICAM 7.5 MG/1
7.5 TABLET ORAL DAILY
Qty: 30 TABLET | Refills: 0 | Status: SHIPPED | OUTPATIENT
Start: 2024-08-22

## 2024-09-12 ENCOUNTER — TELEPHONE (OUTPATIENT)
Age: 79
End: 2024-09-12

## 2024-09-12 NOTE — TELEPHONE ENCOUNTER
Pt c/o red rash on left leg and daughter who is a nurse suggested she request a script for betamethasone 0.05 % ointment to [facility], [place].    Pt also is having labs drawn today.

## 2024-09-13 ENCOUNTER — APPOINTMENT (OUTPATIENT)
Dept: LAB | Facility: CLINIC | Age: 79
End: 2024-09-13
Payer: COMMERCIAL

## 2024-09-13 DIAGNOSIS — E55.9 VITAMIN D DEFICIENCY: ICD-10-CM

## 2024-09-13 DIAGNOSIS — R73.03 PREDIABETES: ICD-10-CM

## 2024-09-13 DIAGNOSIS — E78.2 MIXED HYPERLIPIDEMIA: ICD-10-CM

## 2024-09-13 DIAGNOSIS — R21 RASH IN ADULT: Primary | ICD-10-CM

## 2024-09-13 DIAGNOSIS — B02.8 HERPES ZOSTER WITH COMPLICATION: ICD-10-CM

## 2024-09-13 LAB
25(OH)D3 SERPL-MCNC: 32.2 NG/ML (ref 30–100)
ALBUMIN SERPL BCG-MCNC: 4.4 G/DL (ref 3.5–5)
ALP SERPL-CCNC: 80 U/L (ref 34–104)
ALT SERPL W P-5'-P-CCNC: 19 U/L (ref 7–52)
ANION GAP SERPL CALCULATED.3IONS-SCNC: 7 MMOL/L (ref 4–13)
AST SERPL W P-5'-P-CCNC: 31 U/L (ref 13–39)
BILIRUB SERPL-MCNC: 0.51 MG/DL (ref 0.2–1)
BUN SERPL-MCNC: 16 MG/DL (ref 5–25)
CALCIUM SERPL-MCNC: 9.6 MG/DL (ref 8.4–10.2)
CHLORIDE SERPL-SCNC: 101 MMOL/L (ref 96–108)
CHOLEST SERPL-MCNC: 192 MG/DL
CO2 SERPL-SCNC: 31 MMOL/L (ref 21–32)
CREAT SERPL-MCNC: 0.61 MG/DL (ref 0.6–1.3)
ERYTHROCYTE [DISTWIDTH] IN BLOOD BY AUTOMATED COUNT: 13.1 % (ref 11.6–15.1)
EST. AVERAGE GLUCOSE BLD GHB EST-MCNC: 137 MG/DL
GFR SERPL CREATININE-BSD FRML MDRD: 86 ML/MIN/1.73SQ M
GLUCOSE P FAST SERPL-MCNC: 107 MG/DL (ref 65–99)
HBA1C MFR BLD: 6.4 %
HCT VFR BLD AUTO: 39.9 % (ref 34.8–46.1)
HDLC SERPL-MCNC: 72 MG/DL
HGB BLD-MCNC: 12.7 G/DL (ref 11.5–15.4)
LDLC SERPL CALC-MCNC: 107 MG/DL (ref 0–100)
MCH RBC QN AUTO: 28.7 PG (ref 26.8–34.3)
MCHC RBC AUTO-ENTMCNC: 31.8 G/DL (ref 31.4–37.4)
MCV RBC AUTO: 90 FL (ref 82–98)
NONHDLC SERPL-MCNC: 120 MG/DL
PLATELET # BLD AUTO: 240 THOUSANDS/UL (ref 149–390)
PMV BLD AUTO: 11.2 FL (ref 8.9–12.7)
POTASSIUM SERPL-SCNC: 3.7 MMOL/L (ref 3.5–5.3)
PROT SERPL-MCNC: 7.5 G/DL (ref 6.4–8.4)
RBC # BLD AUTO: 4.42 MILLION/UL (ref 3.81–5.12)
SODIUM SERPL-SCNC: 139 MMOL/L (ref 135–147)
TRIGL SERPL-MCNC: 64 MG/DL
WBC # BLD AUTO: 5.54 THOUSAND/UL (ref 4.31–10.16)

## 2024-09-13 PROCEDURE — 36415 COLL VENOUS BLD VENIPUNCTURE: CPT

## 2024-09-13 PROCEDURE — 80053 COMPREHEN METABOLIC PANEL: CPT

## 2024-09-13 PROCEDURE — 83036 HEMOGLOBIN GLYCOSYLATED A1C: CPT

## 2024-09-13 PROCEDURE — 82306 VITAMIN D 25 HYDROXY: CPT

## 2024-09-13 PROCEDURE — 80061 LIPID PANEL: CPT

## 2024-09-13 PROCEDURE — 85027 COMPLETE CBC AUTOMATED: CPT

## 2024-09-13 RX ORDER — BETAMETHASONE DIPROPIONATE 0.5 MG/G
CREAM TOPICAL 2 TIMES DAILY
Qty: 45 G | Refills: 2 | Status: SHIPPED | OUTPATIENT
Start: 2024-09-13

## 2024-09-13 NOTE — TELEPHONE ENCOUNTER
Spoke to patient and informed. Patient wanted to let you know that she will miss you and good luck on your new location

## 2024-09-17 DIAGNOSIS — B37.9 YEAST INFECTION: Primary | ICD-10-CM

## 2024-09-17 RX ORDER — FLUCONAZOLE 200 MG/1
200 TABLET ORAL DAILY
Qty: 10 TABLET | Refills: 0 | Status: SHIPPED | OUTPATIENT
Start: 2024-09-17 | End: 2024-09-27

## 2024-09-30 DIAGNOSIS — R21 RASH IN ADULT: ICD-10-CM

## 2024-09-30 DIAGNOSIS — B02.8 HERPES ZOSTER WITH COMPLICATION: ICD-10-CM

## 2024-09-30 NOTE — TELEPHONE ENCOUNTER
Reason for call:   [x] Refill   [] Prior Auth  [] Other:     Office:   [x] PCP/Provider - [facility] - REGINA Meza   [] Specialty/Provider -     Medication: gabapentin (Neurontin) 100 mg capsule     Dose/Frequency: Take 1 capsule (100 mg total) by mouth 3 (three) times a day     Quantity: 90 capsule     Medication: meloxicam (Mobic) 7.5 mg tablet     Dose/Frequency: Take 1 tablet (7.5 mg total) by mouth daily     Quantity: 30 tablet     Pharmacy: [place] [id number] [address] [phone]    Does the patient have enough for 3 days?   [x] Yes   [] No - Send as HP to POD

## 2024-10-01 RX ORDER — MELOXICAM 7.5 MG/1
7.5 TABLET ORAL DAILY
Qty: 90 TABLET | Refills: 1 | Status: SHIPPED | OUTPATIENT
Start: 2024-10-01

## 2024-10-01 RX ORDER — GABAPENTIN 100 MG/1
100 CAPSULE ORAL 3 TIMES DAILY
Qty: 90 CAPSULE | Refills: 5 | Status: SHIPPED | OUTPATIENT
Start: 2024-10-01

## 2024-12-06 DIAGNOSIS — R21 RASH IN ADULT: ICD-10-CM

## 2024-12-06 RX ORDER — BETAMETHASONE DIPROPIONATE 0.5 MG/G
CREAM TOPICAL
Qty: 45 G | Refills: 0 | Status: SHIPPED | OUTPATIENT
Start: 2024-12-06

## 2025-01-10 DIAGNOSIS — B02.8 HERPES ZOSTER WITH COMPLICATION: ICD-10-CM

## 2025-01-10 DIAGNOSIS — R21 RASH IN ADULT: ICD-10-CM

## 2025-01-10 DIAGNOSIS — R60.0 PERIPHERAL EDEMA: ICD-10-CM

## 2025-01-10 RX ORDER — HYDROCHLOROTHIAZIDE 12.5 MG/1
12.5 TABLET ORAL DAILY
Qty: 90 TABLET | Refills: 1 | Status: SHIPPED | OUTPATIENT
Start: 2025-01-10 | End: 2025-07-09

## 2025-01-10 RX ORDER — CYCLOBENZAPRINE HCL 5 MG
5 TABLET ORAL 3 TIMES DAILY PRN
Qty: 90 TABLET | Refills: 0 | Status: SHIPPED | OUTPATIENT
Start: 2025-01-10

## 2025-01-10 NOTE — TELEPHONE ENCOUNTER
Reason for call:   [x] Refill   [] Prior Auth  [] Other:     Office:   [x] PCP/Provider - REGINA Meza   [] Specialty/Provider -     Medication:     cyclobenzaprine (FLEXERIL) 5 mg tablet       hydroCHLOROthiazide 12.5 mg tablet       Dose/Frequency:   5 mg, Oral, 3 times daily PRN   12.5 mg, Oral, Daily   Quantity:   90  90    Pharmacy:  [place] [id number] [address]     Does the patient have enough for 3 days?   [] Yes   [x] No - Send as HP to POD

## 2025-01-13 ENCOUNTER — OFFICE VISIT (OUTPATIENT)
Dept: INTERNAL MEDICINE CLINIC | Facility: CLINIC | Age: 80
End: 2025-01-13
Payer: COMMERCIAL

## 2025-01-13 VITALS
BODY MASS INDEX: 32.86 KG/M2 | HEART RATE: 75 BPM | WEIGHT: 163 LBS | HEIGHT: 59 IN | TEMPERATURE: 97.7 F | OXYGEN SATURATION: 97 %

## 2025-01-13 DIAGNOSIS — F11.20 CONTINUOUS OPIOID DEPENDENCE (HCC): ICD-10-CM

## 2025-01-13 DIAGNOSIS — R60.0 PERIPHERAL EDEMA: ICD-10-CM

## 2025-01-13 DIAGNOSIS — S46.211A BICEPS TENDON RUPTURE, PROXIMAL, RIGHT, INITIAL ENCOUNTER: ICD-10-CM

## 2025-01-13 DIAGNOSIS — M70.50 PES ANSERINE BURSITIS: Primary | ICD-10-CM

## 2025-01-13 PROCEDURE — 99214 OFFICE O/P EST MOD 30 MIN: CPT | Performed by: INTERNAL MEDICINE

## 2025-01-13 RX ORDER — METHYLPREDNISOLONE 4 MG/1
TABLET ORAL
Qty: 21 EACH | Refills: 0 | Status: SHIPPED | OUTPATIENT
Start: 2025-01-13

## 2025-01-13 NOTE — PROGRESS NOTES
"Name: Arnel Jensen      : 1945      MRN: [id number]  Encounter Provider: Juan M Nettles DO  Encounter Date: 2025   Encounter department: [facility]  :  Assessment & Plan  Peripheral edema  Continue the HCTZ PRN       Pes anserine bursitis  The patient note no injury to the shoulder  The patient states that she has used the Meloxicam 7.5 mg Qday unsuccessfully  The patient notes the pain is stiff and achy and worsens with pain  Orders:  •  methylPREDNISolone 4 MG tablet therapy pack; Use as directed on package    Biceps tendon rupture, proximal, right, initial encounter  No injury.  Pain in the lateral right arm       Continuous opioid dependence (HCC)                History of Present Illness     The patient was seen and examined and noted to have issues with pain in the bilateral medial knees and the lateral right biceps area for 3 weeks no injury noted      Review of Systems   Constitutional:  Negative for chills and fever.   HENT:  Negative for ear pain and sore throat.    Eyes:  Negative for pain and visual disturbance.   Respiratory:  Negative for cough and shortness of breath.    Cardiovascular:  Negative for chest pain and palpitations.   Gastrointestinal:  Negative for abdominal pain and vomiting.   Genitourinary:  Negative for dysuria and hematuria.   Musculoskeletal:  Negative for arthralgias and back pain.   Skin:  Negative for color change and rash.   Neurological:  Negative for seizures and syncope.   All other systems reviewed and are negative.      Objective   Pulse 75   Temp 97.7 °F (36.5 °C)   Ht 4' 11\" (1.499 m)   Wt 73.9 kg (163 lb)   SpO2 97%   BMI 32.92 kg/m²      Physical Exam  Vitals and nursing note reviewed.   Constitutional:       General: She is not in acute distress.     Appearance: She is well-developed.   HENT:      Head: Normocephalic and atraumatic.   Eyes:      Conjunctiva/sclera: Conjunctivae normal.   Cardiovascular:      Rate and " Rhythm: Normal rate and regular rhythm.      Heart sounds: No murmur heard.  Pulmonary:      Effort: Pulmonary effort is normal. No respiratory distress.      Breath sounds: Normal breath sounds.   Abdominal:      Palpations: Abdomen is soft.      Tenderness: There is no abdominal tenderness.   Musculoskeletal:         General: No swelling.      Cervical back: Neck supple.   Skin:     General: Skin is warm and dry.      Capillary Refill: Capillary refill takes less than 2 seconds.   Neurological:      Mental Status: She is alert.   Psychiatric:         Mood and Affect: Mood normal.

## 2025-01-23 ENCOUNTER — OFFICE VISIT (OUTPATIENT)
Dept: INTERNAL MEDICINE CLINIC | Facility: CLINIC | Age: 80
End: 2025-01-23
Payer: COMMERCIAL

## 2025-01-23 VITALS
BODY MASS INDEX: 32.74 KG/M2 | HEART RATE: 67 BPM | OXYGEN SATURATION: 98 % | WEIGHT: 162.4 LBS | TEMPERATURE: 97.8 F | HEIGHT: 59 IN | SYSTOLIC BLOOD PRESSURE: 122 MMHG | DIASTOLIC BLOOD PRESSURE: 70 MMHG

## 2025-01-23 DIAGNOSIS — G89.29 CHRONIC PAIN OF LEFT KNEE: ICD-10-CM

## 2025-01-23 DIAGNOSIS — M70.50 PES ANSERINE BURSITIS: ICD-10-CM

## 2025-01-23 DIAGNOSIS — L50.9 URTICARIA: Primary | ICD-10-CM

## 2025-01-23 DIAGNOSIS — M25.562 CHRONIC PAIN OF LEFT KNEE: ICD-10-CM

## 2025-01-23 PROCEDURE — 99213 OFFICE O/P EST LOW 20 MIN: CPT | Performed by: INTERNAL MEDICINE

## 2025-01-23 PROCEDURE — G2211 COMPLEX E/M VISIT ADD ON: HCPCS | Performed by: INTERNAL MEDICINE

## 2025-01-23 NOTE — PROGRESS NOTES
Name: Arnel Jensen      : 1945      MRN: [id number]  Encounter Provider: Myke Harvey DO  Encounter Date: 2025   Encounter department: [facility]  :  Assessment & Plan  Urticaria         Chronic pain of left knee    Orders:    Ambulatory referral to Physical Therapy; Future    Pes anserine bursitis    Orders:    Ambulatory referral to Physical Therapy; Future    A/P: Stable with VSS ok. Suspect hives, but ?cause. Was just on steroids. Recommend atarax and H2 blocker at this time. But pt defers any oral meds at this time. Pt to try OTC benadryl. Discussed avoiding all NSAID's when on steroids. Suspect anserine bursitis. Consider injection, but given ongoing urticaria, hold on any new meds or injections. Will start thermal therapy and refer to PT. May come back for injection once hives resolve or see Ortho. Hold on labs for hives for now and may need to see derm for bx. Hold on knee imaging for now. RTC as scheduled.        History of Present Illness   HF presents due to ongoing left knee pain despite being placed on medrol dose pack last week. No swelling, redness, or increase temp. Pt was taking both steroids and mobic together. No improvement. Also, reports having rash on the lower body for three days. Just finished the steroids. No  new meds otherwise. No exposures. No one else affected. No travel. No fever or chills. No n/v. No CP, SOB, wheezing  or lightheadedness. No trouble swallowing. Itchy spots. Prior hives of ?cause, but usually resolves after a day.       Review of Systems   Constitutional:  Positive for activity change. Negative for chills, diaphoresis, fatigue and fever.   HENT: Negative.     Eyes:  Negative for visual disturbance.   Respiratory:  Negative for cough, chest tightness, shortness of breath and wheezing.    Cardiovascular:  Negative for chest pain, palpitations and leg swelling.   Gastrointestinal:  Negative for abdominal pain, constipation,  "diarrhea, nausea and vomiting.   Genitourinary:  Negative for difficulty urinating, dysuria and frequency.   Musculoskeletal:  Negative for arthralgias, gait problem and myalgias.   Skin:  Positive for rash.   Neurological:  Negative for dizziness, seizures, syncope, weakness, light-headedness and headaches.   Psychiatric/Behavioral:  Negative for confusion, hallucinations and sleep disturbance. The patient is not nervous/anxious.        Objective   /70   Pulse 67   Temp 97.8 °F (36.6 °C) (Tympanic)   Ht 4' 11\" (1.499 m)   Wt 73.7 kg (162 lb 6.4 oz)   SpO2 98%   BMI 32.80 kg/m²      Physical Exam  Vitals and nursing note reviewed.   Constitutional:       General: She is not in acute distress.     Appearance: Normal appearance. She is not ill-appearing.   HENT:      Head: Normocephalic and atraumatic.      Mouth/Throat:      Mouth: Mucous membranes are moist.   Eyes:      Extraocular Movements: Extraocular movements intact.      Conjunctiva/sclera: Conjunctivae normal.      Pupils: Pupils are equal, round, and reactive to light.   Neck:      Vascular: No carotid bruit.   Cardiovascular:      Rate and Rhythm: Normal rate and regular rhythm.      Heart sounds: Normal heart sounds. No murmur heard.  Pulmonary:      Effort: Pulmonary effort is normal. No respiratory distress.      Breath sounds: Normal breath sounds. No stridor. No wheezing, rhonchi or rales.   Abdominal:      General: Bowel sounds are normal. There is no distension.      Palpations: Abdomen is soft.      Tenderness: There is no abdominal tenderness.   Musculoskeletal:         General: Tenderness present. No swelling, deformity or signs of injury. Normal range of motion.      Cervical back: Neck supple. No tenderness.      Right lower leg: No edema.      Left lower leg: No edema.      Comments: Right knee w/o any gross deformities, increase temp, erythema, or swelling. No effusion or ballotment. No crepitus. Collaterals intact. Negative " Drawer's. Negative Yenny's. ROM wnl. Tenderness over the medial tib-fib area. .       Lymphadenopathy:      Cervical: No cervical adenopathy.   Skin:     Findings: Lesion (Multiple discrete erythematous maculopapular nonblanching lesions on the hips and legs.) present.   Neurological:      General: No focal deficit present.      Mental Status: She is alert and oriented to person, place, and time. Mental status is at baseline.   Psychiatric:         Mood and Affect: Mood normal.         Behavior: Behavior normal.         Thought Content: Thought content normal.         Judgment: Judgment normal.

## 2025-01-27 ENCOUNTER — OFFICE VISIT (OUTPATIENT)
Dept: FAMILY MEDICINE CLINIC | Facility: CLINIC | Age: 80
End: 2025-01-27
Payer: COMMERCIAL

## 2025-01-27 ENCOUNTER — APPOINTMENT (OUTPATIENT)
Dept: RADIOLOGY | Facility: CLINIC | Age: 80
End: 2025-01-27
Payer: COMMERCIAL

## 2025-01-27 VITALS
WEIGHT: 163.4 LBS | DIASTOLIC BLOOD PRESSURE: 68 MMHG | TEMPERATURE: 98 F | OXYGEN SATURATION: 97 % | RESPIRATION RATE: 16 BRPM | SYSTOLIC BLOOD PRESSURE: 112 MMHG | HEART RATE: 74 BPM | BODY MASS INDEX: 32.94 KG/M2 | HEIGHT: 59 IN

## 2025-01-27 DIAGNOSIS — H04.123 DRY EYE SYNDROME OF BOTH EYES: ICD-10-CM

## 2025-01-27 DIAGNOSIS — G89.4 CHRONIC PAIN DISORDER: Chronic | ICD-10-CM

## 2025-01-27 DIAGNOSIS — E78.2 MIXED HYPERLIPIDEMIA: Chronic | ICD-10-CM

## 2025-01-27 DIAGNOSIS — R93.7 ABNORMAL MRI, LUMBAR SPINE: Chronic | ICD-10-CM

## 2025-01-27 DIAGNOSIS — Z00.00 MEDICARE ANNUAL WELLNESS VISIT, SUBSEQUENT: Primary | ICD-10-CM

## 2025-01-27 DIAGNOSIS — F11.20 CONTINUOUS OPIOID DEPENDENCE (HCC): Chronic | ICD-10-CM

## 2025-01-27 DIAGNOSIS — I87.2 VENOUS INSUFFICIENCY OF BOTH LOWER EXTREMITIES: Chronic | ICD-10-CM

## 2025-01-27 DIAGNOSIS — K21.9 GERD WITHOUT ESOPHAGITIS: Chronic | ICD-10-CM

## 2025-01-27 DIAGNOSIS — G89.29 CHRONIC PAIN OF LEFT KNEE: ICD-10-CM

## 2025-01-27 DIAGNOSIS — R60.0 BILATERAL LOWER EXTREMITY EDEMA: Chronic | ICD-10-CM

## 2025-01-27 DIAGNOSIS — R73.03 PREDIABETES: Chronic | ICD-10-CM

## 2025-01-27 DIAGNOSIS — M25.562 CHRONIC PAIN OF LEFT KNEE: ICD-10-CM

## 2025-01-27 DIAGNOSIS — R21 RASH IN ADULT: Chronic | ICD-10-CM

## 2025-01-27 DIAGNOSIS — E55.9 VITAMIN D DEFICIENCY: ICD-10-CM

## 2025-01-27 PROBLEM — M19.049 ARTHRITIS OF HAND: Status: ACTIVE | Noted: 2019-07-24

## 2025-01-27 PROBLEM — M25.511 CHRONIC RIGHT SHOULDER PAIN: Chronic | Status: ACTIVE | Noted: 2017-05-03

## 2025-01-27 PROBLEM — M54.50 CHRONIC LOWER BACK PAIN: Chronic | Status: ACTIVE | Noted: 2017-07-26

## 2025-01-27 PROCEDURE — G0439 PPPS, SUBSEQ VISIT: HCPCS | Performed by: NURSE PRACTITIONER

## 2025-01-27 PROCEDURE — G2211 COMPLEX E/M VISIT ADD ON: HCPCS | Performed by: NURSE PRACTITIONER

## 2025-01-27 PROCEDURE — 99214 OFFICE O/P EST MOD 30 MIN: CPT | Performed by: NURSE PRACTITIONER

## 2025-01-27 PROCEDURE — 73562 X-RAY EXAM OF KNEE 3: CPT

## 2025-01-27 NOTE — PROGRESS NOTES
Name: Arnel Jensen      : 1945      MRN: [id number]  Encounter Provider: REGINA Meza  Encounter Date: 2025   Encounter department: [facility]    Assessment & Plan  Venous insufficiency of both lower extremities         GERD without esophagitis  This is a chronic disease process.   The patient is stable at this time.  We discussed diet and exercise.         Rash in adult  2025 in other office:  Also, reports having rash on the lower body for three days.   Just finished the steroids.   No new meds otherwise.   No exposures.   No one else affected.   No travel.   No fever or chills.   No n/v.   No CP, SOB, wheezing or lightheadedness.   No trouble swallowing.   Itchy spots.   Prior hives of ?cause, but usually resolves after a day.        Bilateral lower extremity edema    Orders:  •  XR knee 3 vw left non injury; Future  •  XR knee 3 vw right non injury; Future    Medicare annual wellness visit, subsequent    Orders:  •  Comprehensive metabolic panel; Future  •  CBC and differential; Future  •  Comprehensive metabolic panel; Future  •  CBC and differential; Future    Mixed hyperlipidemia  This is a chronic disease process.   The patient is stable at this time.  We discussed diet and exercise.  Will monitor labs.  Orders:  •  Lipid panel; Future  •  Lipid panel; Future    Prediabetes    Orders:  •  Hemoglobin A1C; Future  •  Hemoglobin A1C; Future    Vitamin D deficiency    Orders:  •  Vitamin D 25 hydroxy; Future  •  Vitamin D 25 hydroxy; Future    Chronic pain of left knee  2025 in other office:  HF presents due to ongoing left knee pain despite being placed on medrol dose pack last week.   No swelling, redness, or increase temp.   Pt was taking both steroids and mobic together.   No improvement.           Will get bilateral knee xrays     Orders:  •  XR knee 3 vw left non injury; Future    Dry eye syndrome of both eyes         Continuous opioid  dependence (HCC)         Chronic pain disorder  This is a chronic disease process.   The patient is stable at this time.  We discussed diet and exercise.  Orders:  •  XR knee 3 vw right non injury; Future    Abnormal MRI, lumbar spine  7/21/2022  FINDINGS:   VERTEBRAL BODIES:    There are 5 lumbar type vertebral bodies.  S-shaped thoracal lumbar scoliosis.  No compression fracture.      Modic type I endplate changes are noted at the L3-4 level.    No discrete marrow lesion.   SACRUM:  Normal signal within the sacrum. No evidence of insufficiency or stress fracture.   DISTAL CORD AND CONUS:  Normal size and signal within the distal cord and conus. The conus terminates at the L1 level.  The cauda equina nerve roots appear within normal limits.   PARASPINAL SOFT TISSUES:  There is dependent subcutaneous edema within the lower back region.   LOWER THORACIC DISC SPACES:  Diffuse disc bulge is noted at the T11-12 level with mild central canal narrowing.   LUMBAR DISC SPACES:   L1-2: Diffuse disc bulge is again noted with a superimposed small broad-based central disc protrusion.  No central canal or subarticular/lateral recess narrowing.  Moderate right neural foraminal stenosis is again noted.   L2-3: Diffuse disc bulge, eccentric to the left with superimposed broad-based central and left foraminal disc protrusions.  Moderate left subarticular/lateral recess narrowing.  Mild left neural foraminal stenosis is again noted.   L3-4: Moderate diffuse disc bulge is noted extending into the foraminal regions with spondylotic ridging.  There is bilateral ligamentum flavum and facet hypertrophy.  Mild central canal and bilateral subarticular/lateral recess narrowing.  Mild bilateral neural foraminal stenosis.   L4-5: Diffuse disc bulge is again noted with a superimposed broad-based central disc protrusion.  There is bilateral ligamentum flavum and facet hypertrophy..  Mild central canal and bilateral subarticular/lateral recess  narrowing.  Mild bilateral neural foraminal stenosis.   L5-S1: Diffuse disc bulge is noted with spondylotic ridging.  There is bilateral facet hypertrophy.  Mild central canal and new moderate to severe left subarticular/lateral recess narrowing with encroachment of the traversing left S1 nerve root.    Moderate to severe bilateral neural foraminal stenosis is again noted with encroachment of the bilateral exiting L5 nerve roots..  IMPRESSION:  1. S-shaped thoracolumbar scoliosis.  The lumbar vertebral body heights are maintained demonstrating no acute fracture or subluxation.  2. Multilevel degenerative disc disease with no new disc herniation.  New moderate to severe left L5-S1 subarticular/lateral recess narrowing with encroachment of the traversing left S1 nerve root.  Persistent moderate to severe bilateral L5-S1 neural foraminal narrowing with encroachment of the exiting L5 nerve roots.  Correlate with radiculopathy symptoms.         Depression Screening and Follow-up Plan: Patient was screened for depression during today's encounter. They screened negative with a PHQ-2 score of 0.    Falls Plan of Care: Assessed feet and footwear. Home safety education provided.     Urinary Incontinence Plan of Care: counseling topics discussed: practice Kegel (pelvic floor strengthening) exercises, use restroom every 2 hours, limit alcohol, caffeine, spicy foods, and acidic foods, limiting fluid intake 3-4 hours before bed and preventing constipation.       Preventive health issues were discussed with patient, and age appropriate screening tests were ordered as noted in patient's After Visit Summary. Personalized health advice and appropriate referrals for health education or preventive services given if needed, as noted in patient's After Visit Summary.    History of Present Illness     The patient is here today to discuss her chronic knee pain and her Medicare annual wellness visit.   I reviewed their medical conditions,  medications, laboratory and radiology studies.  I reviewed their scheduled future lab studies with the patient.   The patient's current treatment plan is effective.   There is no change in the current therapy.   I ask the patient to continue their current therapy.   The patient voiced their understanding and agreement.   Follow up in 6 months.  Please continue to the PORCH section of the note for details of today's visit.           Knee Pain        Patient Care Team:  REGINA Meza as PCP - General (Internal Medicine)  Myke Harvey DO as PCP - PCP-[facility] ([facility])    Review of Systems   Constitutional:  Negative for activity change, chills, fatigue and fever.   HENT:  Negative for rhinorrhea and sore throat.    Eyes:  Negative for pain.   Respiratory:  Negative for cough and shortness of breath.    Cardiovascular:  Negative for chest pain, palpitations and leg swelling.   Gastrointestinal:  Negative for abdominal pain, constipation, diarrhea, nausea and vomiting.   Genitourinary:  Negative for difficulty urinating, flank pain, frequency and urgency.   Musculoskeletal:  Positive for arthralgias and myalgias. Negative for gait problem and joint swelling.   Skin:  Negative for color change.   Neurological:  Negative for dizziness, weakness, light-headedness and headaches.   Psychiatric/Behavioral:  Negative for sleep disturbance. The patient is not nervous/anxious.    All other systems reviewed and are negative.    Medical History Reviewed by provider this encounter:  Tobacco  Allergies  Meds  Problems  Med Hx  Surg Hx  Fam Hx       Annual Wellness Visit Questionnaire   Arnel is here for her Subsequent Wellness visit. Last Medicare Wellness visit information reviewed, patient interviewed and updates made to the record today.      Health Risk Assessment:   Patient rates overall health as good. Patient feels that their physical health rating is same. Patient is satisfied with their life.  Eyesight was rated as same. Hearing was rated as same. Patient feels that their emotional and mental health rating is same. Patients states they are never, rarely angry. Patient states they are never, rarely unusually tired/fatigued. Pain experienced in the last 7 days has been a lot. Patient's pain rating has been 9/10. Patient states that she has experienced no weight loss or gain in last 6 months. L knee pain.     Depression Screening:   PHQ-2 Score: 0  PHQ-9 Score: 0      Fall Risk Screening:   In the past year, patient has experienced: no history of falling in past year      Urinary Incontinence Screening:   Patient has not leaked urine accidently in the last six months.     Home Safety:  Patient has trouble with stairs inside or outside of their home. Patient has working smoke alarms and has working carbon monoxide detector. Home safety hazards include: none. Left knee pain.     Nutrition:   Current diet is Regular.     Medications:   Patient is currently taking over-the-counter supplements. OTC medications include: see medication list. Patient is able to manage medications.     Activities of Daily Living (ADLs)/Instrumental Activities of Daily Living (IADLs):   Walk and transfer into and out of bed and chair?: Yes  Dress and groom yourself?: Yes    Bathe or shower yourself?: Yes    Feed yourself? Yes  Do your laundry/housekeeping?: Yes  Manage your money, pay your bills and track your expenses?: Yes  Make your own meals?: Yes    Do your own shopping?: Yes    Previous Hospitalizations:   Any hospitalizations or ED visits within the last 12 months?: No      Advance Care Planning:   Living will: Yes    Durable POA for healthcare: Yes    Advanced directive: Yes      Cognitive Screening:   Provider or family/friend/caregiver concerned regarding cognition?: No    PREVENTIVE SCREENINGS      Cardiovascular Screening:    General: Screening Not Indicated and History Lipid Disorder      Diabetes Screening:     General:  Screening Current      Cervical Cancer Screening:    General: Screening Not Indicated      Lung Cancer Screening:     General: Screening Not Indicated      Hepatitis C Screening:    General: Screening Current    Screening, Brief Intervention, and Referral to Treatment (SBIRT)    Screening  Typical number of drinks in a day: 0  Typical number of drinks in a week: 0  Interpretation: Low risk drinking behavior.    Single Item Drug Screening:  How often have you used an illegal drug (including marijuana) or a prescription medication for non-medical reasons in the past year? never    Single Item Drug Screen Score: 0  Interpretation: Negative screen for possible drug use disorder    Review of Current Opioid Use    Opioid Risk Tool (ORT) Interpretation: Complete Opioid Risk Tool (ORT)    [place] PDMP or [place]  reviewed. No red flags were identified    Other Counseling Topics:   Car/seat belt/driving safety, skin self-exam, sunscreen and calcium and vitamin D intake and regular weightbearing exercise.     Social Drivers of Health     Financial Resource Strain: Low Risk  (5/31/2023)    Overall Financial Resource Strain (CARDIA)    • Difficulty of Paying Living Expenses: Not very hard   Food Insecurity: No Food Insecurity (1/27/2025)    Hunger Vital Sign    • Worried About Running Out of Food in the Last Year: Never true    • Ran Out of Food in the Last Year: Never true   Transportation Needs: No Transportation Needs (1/27/2025)    PRAPARE - Transportation    • Lack of Transportation (Medical): No    • Lack of Transportation (Non-Medical): No   Housing Stability: Low Risk  (1/27/2025)    Housing Stability Vital Sign    • Unable to Pay for Housing in the Last Year: No    • Number of Times Moved in the Last Year: 0    • Homeless in the Last Year: No   Utilities: Not At Risk (1/27/2025)    [facility] Utilities    • Threatened with loss of utilities: No     No results found.    Objective   /68 (BP Location: Left arm, Patient Position:  "Sitting, Cuff Size: Large)   Pulse 74   Temp 98 °F (36.7 °C) (Tympanic)   Resp 16   Ht 4' 11\" (1.499 m)   Wt 74.1 kg (163 lb 6.4 oz)   SpO2 97%   BMI 33.00 kg/m²     Physical Exam  Vitals and nursing note reviewed.   Constitutional:       General: She is awake. She is not in acute distress.     Appearance: Normal appearance. She is well-developed.   HENT:      Head: Normocephalic and atraumatic.      Nose: Nose normal.      Mouth/Throat:      Mouth: Mucous membranes are moist.   Eyes:      Conjunctiva/sclera: Conjunctivae normal.   Cardiovascular:      Rate and Rhythm: Normal rate and regular rhythm.      Pulses: Normal pulses.      Heart sounds: Normal heart sounds. No murmur heard.  Pulmonary:      Effort: Pulmonary effort is normal. No respiratory distress.      Breath sounds: Normal breath sounds.   Abdominal:      General: Bowel sounds are normal.      Palpations: Abdomen is soft.      Tenderness: There is no abdominal tenderness.   Musculoskeletal:      Cervical back: Neck supple.      Right knee: Decreased range of motion.      Left knee: Decreased range of motion. Tenderness present.      Right lower leg: No edema.      Left lower leg: No edema.   Skin:     General: Skin is warm and dry.   Neurological:      Mental Status: She is alert and oriented to person, place, and time.   Psychiatric:         Attention and Perception: Attention normal.         Mood and Affect: Mood normal.         Speech: Speech normal.         Behavior: Behavior normal. Behavior is cooperative.         Thought Content: Thought content normal.         Cognition and Memory: Cognition normal.         Judgment: Judgment normal.       Administrative Statements   I have spent a total time of 45 minutes in caring for this patient on the day of the visit/encounter including Diagnostic results, Prognosis, Risks and benefits of tx options, Instructions for management, Patient and family education, Importance of tx compliance, Risk factor " reductions, Impressions, Counseling / Coordination of care, Documenting in the medical record, Reviewing / ordering tests, medicine, procedures  , and Obtaining or reviewing history  . Topics discussed with the patient / family include symptom assessment and management, medication review, medication adjustment, psychosocial support, and supportive listening.

## 2025-01-27 NOTE — ASSESSMENT & PLAN NOTE
This is a chronic disease process.   The patient is stable at this time.  We discussed diet and exercise.

## 2025-01-27 NOTE — ASSESSMENT & PLAN NOTE
This is a chronic disease process.   The patient is stable at this time.  We discussed diet and exercise.  Will monitor labs.  Orders:  •  Lipid panel; Future  •  Lipid panel; Future

## 2025-01-27 NOTE — ASSESSMENT & PLAN NOTE
7/21/2022  FINDINGS:   VERTEBRAL BODIES:    There are 5 lumbar type vertebral bodies.  S-shaped thoracal lumbar scoliosis.  No compression fracture.      Modic type I endplate changes are noted at the L3-4 level.    No discrete marrow lesion.   SACRUM:  Normal signal within the sacrum. No evidence of insufficiency or stress fracture.   DISTAL CORD AND CONUS:  Normal size and signal within the distal cord and conus. The conus terminates at the L1 level.  The cauda equina nerve roots appear within normal limits.   PARASPINAL SOFT TISSUES:  There is dependent subcutaneous edema within the lower back region.   LOWER THORACIC DISC SPACES:  Diffuse disc bulge is noted at the T11-12 level with mild central canal narrowing.   LUMBAR DISC SPACES:   L1-2: Diffuse disc bulge is again noted with a superimposed small broad-based central disc protrusion.  No central canal or subarticular/lateral recess narrowing.  Moderate right neural foraminal stenosis is again noted.   L2-3: Diffuse disc bulge, eccentric to the left with superimposed broad-based central and left foraminal disc protrusions.  Moderate left subarticular/lateral recess narrowing.  Mild left neural foraminal stenosis is again noted.   L3-4: Moderate diffuse disc bulge is noted extending into the foraminal regions with spondylotic ridging.  There is bilateral ligamentum flavum and facet hypertrophy.  Mild central canal and bilateral subarticular/lateral recess narrowing.  Mild bilateral neural foraminal stenosis.   L4-5: Diffuse disc bulge is again noted with a superimposed broad-based central disc protrusion.  There is bilateral ligamentum flavum and facet hypertrophy..  Mild central canal and bilateral subarticular/lateral recess narrowing.  Mild bilateral neural foraminal stenosis.   L5-S1: Diffuse disc bulge is noted with spondylotic ridging.  There is bilateral facet hypertrophy.  Mild central canal and new moderate to severe left subarticular/lateral recess  narrowing with encroachment of the traversing left S1 nerve root.    Moderate to severe bilateral neural foraminal stenosis is again noted with encroachment of the bilateral exiting L5 nerve roots..  IMPRESSION:  1. S-shaped thoracolumbar scoliosis.  The lumbar vertebral body heights are maintained demonstrating no acute fracture or subluxation.  2. Multilevel degenerative disc disease with no new disc herniation.  New moderate to severe left L5-S1 subarticular/lateral recess narrowing with encroachment of the traversing left S1 nerve root.  Persistent moderate to severe bilateral L5-S1 neural foraminal narrowing with encroachment of the exiting L5 nerve roots.  Correlate with radiculopathy symptoms.

## 2025-01-27 NOTE — ASSESSMENT & PLAN NOTE
1/23/2025 in other office:  HF presents due to ongoing left knee pain despite being placed on medrol dose pack last week.   No swelling, redness, or increase temp.   Pt was taking both steroids and mobic together.   No improvement.           Will get bilateral knee xrays     Orders:  •  XR knee 3 vw left non injury; Future

## 2025-01-27 NOTE — ASSESSMENT & PLAN NOTE
1/23/2025 in other office:  Also, reports having rash on the lower body for three days.   Just finished the steroids.   No new meds otherwise.   No exposures.   No one else affected.   No travel.   No fever or chills.   No n/v.   No CP, SOB, wheezing or lightheadedness.   No trouble swallowing.   Itchy spots.   Prior hives of ?cause, but usually resolves after a day.

## 2025-01-27 NOTE — ASSESSMENT & PLAN NOTE
This is a chronic disease process.   The patient is stable at this time.  We discussed diet and exercise.  Orders:  •  XR knee 3 vw right non injury; Future

## 2025-01-27 NOTE — PATIENT INSTRUCTIONS
_________________________________________________________________  YOU ARE DUE FOR YOUR MEDICARE ANNUAL WELLNESS PHYSICAL EXAM AFTER 1/27/2026.  REPEAT LABS ORDERED, PLEASE HAVE THEM DRAWN THE WEEK PRIOR TO YOUR VISIT (APPROXIMATELY 1/20/2026).  LABS HAVE BEEN ORDERED FOR YOU.   THESE LABS SHOULD BE DRAWN PRIOR TO YOUR NEXT VISIT.  YOU CAN HAVE THEM DRAWN UP TO 1 WEEK PRIOR TO YOUR NEXT VISIT.  HAVING YOUR BLOOD WORK WHEN YOU COME TO YOUR NEXT VISIT WILL ALLOW ME TO PROVIDE THE BEST MEDICAL CARE FOR YOU WITHOUT HAVING EITHER OF US PERFORM MORE WORK THAN NECESSARY.  PLEASE BE COMPLIANT WITH THIS REQUEST.   _____________________________________________________________________  Medicare Preventive Visit Patient Instructions  Thank you for completing your Welcome to Medicare Visit or Medicare Annual Wellness Visit today. Your next wellness visit will be due in one year (1/28/2026).  The screening/preventive services that you may require over the next 5-10 years are detailed below. Some tests may not apply to you based off risk factors and/or age. Screening tests ordered at today's visit but not completed yet may show as past due. Also, please note that scanned in results may not display below.  Preventive Screenings:  Service Recommendations Previous Testing/Comments   Colorectal Cancer Screening  * Colonoscopy    * Fecal Occult Blood Test (FOBT)/Fecal Immunochemical Test (FIT)  * Fecal DNA/Cologuard Test  * Flexible Sigmoidoscopy Age: 45-75 years old   Colonoscopy: every 10 years (may be performed more frequently if at higher risk)  OR  FOBT/FIT: every 1 year  OR  Cologuard: every 3 years  OR  Sigmoidoscopy: every 5 years  Screening may be recommended earlier than age 45 if at higher risk for colorectal cancer. Also, an individualized decision between you and your healthcare provider will decide whether screening between the ages of 76-85 would be appropriate. Colonoscopy: 07/11/2002  FOBT/FIT: Not on file  Cologuard:  Not on file  Sigmoidoscopy: Not on file          Breast Cancer Screening Age: 40+ years old  Frequency: every 1-2 years  Not required if history of left and right mastectomy Mammogram: 06/13/2022        Cervical Cancer Screening Between the ages of 21-29, pap smear recommended once every 3 years.   Between the ages of 30-65, can perform pap smear with HPV co-testing every 5 years.   Recommendations may differ for women with a history of total hysterectomy, cervical cancer, or abnormal pap smears in past. Pap Smear: 02/17/2021    Screening Not Indicated   Hepatitis C Screening Once for adults born between 1945 and 1965  More frequently in patients at high risk for Hepatitis C Hep C Antibody: 04/25/2018    Screening Current   Diabetes Screening 1-2 times per year if you're at risk for diabetes or have pre-diabetes Fasting glucose: 107 mg/dL (9/13/2024)  A1C: 6.4 % (9/13/2024)  Screening Current   Cholesterol Screening Once every 5 years if you don't have a lipid disorder. May order more often based on risk factors. Lipid panel: 09/13/2024    Screening Not Indicated  History Lipid Disorder     Other Preventive Screenings Covered by Medicare:  Abdominal Aortic Aneurysm (AAA) Screening: covered once if your at risk. You're considered to be at risk if you have a family history of AAA.  Lung Cancer Screening: covers low dose CT scan once per year if you meet all of the following conditions: (1) Age 55-77; (2) No signs or symptoms of lung cancer; (3) Current smoker or have quit smoking within the last 15 years; (4) You have a tobacco smoking history of at least 20 pack years (packs per day multiplied by number of years you smoked); (5) You get a written order from a healthcare provider.  Glaucoma Screening: covered annually if you're considered high risk: (1) You have diabetes OR (2) Family history of glaucoma OR (3)  aged 50 and older OR (4)  American aged 65 and older  Osteoporosis Screening:  covered every 2 years if you meet one of the following conditions: (1) You're estrogen deficient and at risk for osteoporosis based off medical history and other findings; (2) Have a vertebral abnormality; (3) On glucocorticoid therapy for more than 3 months; (4) Have primary hyperparathyroidism; (5) On osteoporosis medications and need to assess response to drug therapy.   Last bone density test (DXA Scan): 06/13/2022.  HIV Screening: covered annually if you're between the age of 15-65. Also covered annually if you are younger than 15 and older than 65 with risk factors for HIV infection. For pregnant patients, it is covered up to 3 times per pregnancy.    Immunizations:  Immunization Recommendations   Influenza Vaccine Annual influenza vaccination during flu season is recommended for all persons aged >= 6 months who do not have contraindications   Pneumococcal Vaccine   * Pneumococcal conjugate vaccine = PCV13 (Prevnar 13), PCV15 (Vaxneuvance), PCV20 (Prevnar 20)  * Pneumococcal polysaccharide vaccine = PPSV23 (Pneumovax) Adults 19-65 yo with certain risk factors or if 65+ yo  If never received any pneumonia vaccine: recommend Prevnar 20 (PCV20)  Give PCV20 if previously received 1 dose of PCV13 or PPSV23   Hepatitis B Vaccine 3 dose series if at intermediate or high risk (ex: diabetes, end stage renal disease, liver disease)   Respiratory syncytial virus (RSV) Vaccine - COVERED BY MEDICARE PART D  * RSVPreF3 (Arexvy) CDC recommends that adults 60 years of age and older may receive a single dose of RSV vaccine using shared clinical decision-making (SCDM)   Tetanus (Td) Vaccine - COST NOT COVERED BY MEDICARE PART B Following completion of primary series, a booster dose should be given every 10 years to maintain immunity against tetanus. Td may also be given as tetanus wound prophylaxis.   Tdap Vaccine - COST NOT COVERED BY MEDICARE PART B Recommended at least once for all adults. For pregnant patients, recommended  with each pregnancy.   Shingles Vaccine (Shingrix) - COST NOT COVERED BY MEDICARE PART B  2 shot series recommended in those 19 years and older who have or will have weakened immune systems or those 50 years and older     Health Maintenance Due:      Topic Date Due   • Breast Cancer Screening: Mammogram  06/13/2023   • Hepatitis C Screening  Completed   • Colorectal Cancer Screening  Discontinued     Immunizations Due:      Topic Date Due   • Pneumococcal Vaccine: 65+ Years (1 of 1 - PCV) Never done   • Influenza Vaccine (1) Never done   • COVID-19 Vaccine (1 - 2024-25 season) Never done     Advance Directives   What are advance directives?  Advance directives are legal documents that state your wishes and plans for medical care. These plans are made ahead of time in case you lose your ability to make decisions for yourself. Advance directives can apply to any medical decision, such as the treatments you want, and if you want to donate organs.   What are the types of advance directives?  There are many types of advance directives, and each state has rules about how to use them. You may choose a combination of any of the following:  Living will:  This is a written record of the treatment you want. You can also choose which treatments you do not want, which to limit, and which to stop at a certain time. This includes surgery, medicine, IV fluid, and tube feedings.   Durable power of  for healthcare (DPAHC):  This is a written record that states who you want to make healthcare choices for you when you are unable to make them for yourself. This person, called a proxy, is usually a family member or a friend. You may choose more than 1 proxy.  Do not resuscitate (DNR) order:  A DNR order is used in case your heart stops beating or you stop breathing. It is a request not to have certain forms of treatment, such as CPR. A DNR order may be included in other types of advance directives.  Medical directive:  This  covers the care that you want if you are in a coma, near death, or unable to make decisions for yourself. You can list the treatments you want for each condition. Treatment may include pain medicine, surgery, blood transfusions, dialysis, IV or tube feedings, and a ventilator (breathing machine).  Values history:  This document has questions about your views, beliefs, and how you feel and think about life. This information can help others choose the care that you would choose.  Why are advance directives important?  An advance directive helps you control your care. Although spoken wishes may be used, it is better to have your wishes written down. Spoken wishes can be misunderstood, or not followed. Treatments may be given even if you do not want them. An advance directive may make it easier for your family to make difficult choices about your care.   Urinary Incontinence   Urinary incontinence (UI)  is when you lose control of your bladder. UI develops because your bladder cannot store or empty urine properly. The 3 most common types of UI are stress incontinence, urge incontinence, or both.  Medicines:   May be given to help strengthen your bladder control. Report any side effects of medication to your healthcare provider.  Do pelvic muscle exercises often:  Your pelvic muscles help you stop urinating. Squeeze these muscles tight for 5 seconds, then relax for 5 seconds. Gradually work up to squeezing for 10 seconds. Do 3 sets of 15 repetitions a day, or as directed. This will help strengthen your pelvic muscles and improve bladder control.  Train your bladder:  Go to the bathroom at set times, such as every 2 hours, even if you do not feel the urge to go. You can also try to hold your urine when you feel the urge to go. For example, hold your urine for 5 minutes when you feel the urge to go. As that becomes easier, hold your urine for 10 minutes.   Self-care:   Keep a UI record.  Write down how often you leak urine  and how much you leak. Make a note of what you were doing when you leaked urine.  Drink liquids as directed. You may need to limit the amount of liquid you drink to help control your urine leakage. Do not drink any liquid right before you go to bed. Limit or do not have drinks that contain caffeine or alcohol.   Prevent constipation.  Eat a variety of high-fiber foods. Good examples are high-fiber cereals, beans, vegetables, and whole-grain breads. Walking is the best way to trigger your intestines to have a bowel movement.  Exercise regularly and maintain a healthy weight.  Weight loss and exercise will decrease pressure on your bladder and help you control your leakage.   Use a catheter as directed  to help empty your bladder. A catheter is a tiny, plastic tube that is put into your bladder to drain your urine.   Go to behavior therapy as directed.  Behavior therapy may be used to help you learn to control your urge to urinate.    Weight Management   Why it is important to manage your weight:  Being overweight increases your risk of health conditions such as heart disease, high blood pressure, type 2 diabetes, and certain types of cancer. It can also increase your risk for osteoarthritis, sleep apnea, and other respiratory problems. Aim for a slow, steady weight loss. Even a small amount of weight loss can lower your risk of health problems.  How to lose weight safely:  A safe and healthy way to lose weight is to eat fewer calories and get regular exercise. You can lose up about 1 pound a week by decreasing the number of calories you eat by 500 calories each day.   Healthy meal plan for weight management:  A healthy meal plan includes a variety of foods, contains fewer calories, and helps you stay healthy. A healthy meal plan includes the following:  Eat whole-grain foods more often.  A healthy meal plan should contain fiber. Fiber is the part of grains, fruits, and vegetables that is not broken down by your body.  Whole-grain foods are healthy and provide extra fiber in your diet. Some examples of whole-grain foods are whole-wheat breads and pastas, oatmeal, brown rice, and bulgur.  Eat a variety of vegetables every day.  Include dark, leafy greens such as spinach, kale, arsen greens, and mustard greens. Eat yellow and orange vegetables such as carrots, sweet potatoes, and winter squash.   Eat a variety of fruits every day.  Choose fresh or canned fruit (canned in its own juice or light syrup) instead of juice. Fruit juice has very little or no fiber.  Eat low-fat dairy foods.  Drink fat-free (skim) milk or 1% milk. Eat fat-free yogurt and low-fat cottage cheese. Try low-fat cheeses such as mozzarella and other reduced-fat cheeses.  Choose meat and other protein foods that are low in fat.  Choose beans or other legumes such as split peas or lentils. Choose fish, skinless poultry (chicken or turkey), or lean cuts of red meat (beef or pork). Before you cook meat or poultry, cut off any visible fat.   Use less fat and oil.  Try baking foods instead of frying them. Add less fat, such as margarine, sour cream, regular salad dressing and mayonnaise to foods. Eat fewer high-fat foods. Some examples of high-fat foods include french fries, doughnuts, ice cream, and cakes.  Eat fewer sweets.  Limit foods and drinks that are high in sugar. This includes candy, cookies, regular soda, and sweetened drinks.  Exercise:  Exercise at least 30 minutes per day on most days of the week. Some examples of exercise include walking, biking, dancing, and swimming. You can also fit in more physical activity by taking the stairs instead of the elevator or parking farther away from stores. Ask your healthcare provider about the best exercise plan for you.   Narcotic (Opioid) Safety    Use narcotics safely:  Take prescribed narcotics exactly as directed  Do not give narcotics to others or take narcotics that belong to someone else  Do not mix narcotics  without medicines or alcohol  Do not drive or operate heavy machinery after you take the narcotic  Monitor for side effects and notify your healthcare provider if you experienced side effects such as nausea, sleepiness, itching, or trouble thinking clearly.    Manage constipation:    Constipation is the most common side effect of narcotic medicine. Constipation is when you have hard, dry bowel movements, or you go longer than usual between bowel movements. Tell your healthcare provider about all changes in your bowel movements while you are taking narcotics. He or she may recommend laxative medicine to help you have a bowel movement. He or she may also change the kind of narcotic you are taking, or change when you take it. The following are more ways you can prevent or relieve constipation:    Drink liquids as directed.  You may need to drink extra liquids to help soften and move your bowels. Ask how much liquid to drink each day and which liquids are best for you.  Eat high-fiber foods.  This may help decrease constipation by adding bulk to your bowel movements. High-fiber foods include fruits, vegetables, whole-grain breads and cereals, and beans. Your healthcare provider or dietitian can help you create a high-fiber meal plan. Your provider may also recommend a fiber supplement if you cannot get enough fiber from food.  Exercise regularly.  Regular physical activity can help stimulate your intestines. Walking is a good exercise to prevent or relieve constipation. Ask which exercises are best for you.  Schedule a time each day to have a bowel movement.  This may help train your body to have regular bowel movements. Bend forward while you are on the toilet to help move the bowel movement out. Sit on the toilet for at least 10 minutes, even if you do not have a bowel movement.    Store narcotics safely:   Store narcotics where others cannot easily get them.  Keep them in a locked cabinet or secure area. Do not  keep  them in a purse or other bag you carry with you. A person may be looking for something else and find the narcotics.  Make sure narcotics are stored out of the reach of children.  A child can easily overdose on narcotics. Narcotics may look like candy to a small child.    The best way to dispose of narcotics:      The laws vary by country and area. In the [place], the best way is to return the narcotics through a take-back program. This program is offered by the [place] Drug Enforcement Agency ([employer]). The following are options for using the program:  Take the narcotics to a SOFIA collection site.  The site is often a law enforcement center. Call your local law enforcement center for scheduled take-back days in your area. You will be given information on where to go if the collection site is in a different location.  Take the narcotics to an approved pharmacy or hospital.  A pharmacy or hospital may be set up as a collection site. You will need to ask if it is a [employer] collection site if you were not directed there. A pharmacy or doctor's office may not be able to take back narcotics unless it is a [employer] site.  Use a mail-back system.  This means you are given containers to put the narcotics into. You will then mail them in the containers.  Use a take-back drop box.  This is a place to leave the narcotics at any time. People and animals will not be able to get into the box. Your local law enforcement agency can tell you where to find a drop box in your area.    Other ways to manage pain:   Ask your healthcare provider about non-narcotic medicines to control pain.  Nonprescription medicines include NSAIDs (such as ibuprofen) and acetaminophen. Prescription medicines include muscle relaxers, antidepressants, and steroids.  Pain may be managed without any medicines.  Some ways to relieve pain include massage, aromatherapy, or meditation. Physical or occupational therapy may also help.    For more information:   Drug Enforcement  Administration  [address] [place] [address]  Phone: 6- 526 - [phone]  Web Address: https://www.deadiversion.[facility] Services[facility].gov/drug_disposal/    [place] Food and Drug Administration  [address] [place] [address]  Phone: 4- 242 - 435-1781  Web Address: http://www.fda.gov     © Copyright [employer] 2018 Information is for End User's use only and may not be sold, redistributed or otherwise used for commercial purposes. All illustrations and images included in CareNotes® are the copyrighted property of [employer] or [employer]

## 2025-01-28 ENCOUNTER — APPOINTMENT (OUTPATIENT)
Dept: LAB | Facility: CLINIC | Age: 80
End: 2025-01-28
Payer: COMMERCIAL

## 2025-01-28 DIAGNOSIS — R73.03 PREDIABETES: Chronic | ICD-10-CM

## 2025-01-28 DIAGNOSIS — E78.2 MIXED HYPERLIPIDEMIA: Chronic | ICD-10-CM

## 2025-01-28 DIAGNOSIS — Z00.00 MEDICARE ANNUAL WELLNESS VISIT, SUBSEQUENT: ICD-10-CM

## 2025-01-28 DIAGNOSIS — E55.9 VITAMIN D DEFICIENCY: ICD-10-CM

## 2025-01-28 LAB
25(OH)D3 SERPL-MCNC: 25.1 NG/ML (ref 30–100)
ALBUMIN SERPL BCG-MCNC: 4.2 G/DL (ref 3.5–5)
ALP SERPL-CCNC: 76 U/L (ref 34–104)
ALT SERPL W P-5'-P-CCNC: 22 U/L (ref 7–52)
ANION GAP SERPL CALCULATED.3IONS-SCNC: 11 MMOL/L (ref 4–13)
AST SERPL W P-5'-P-CCNC: 30 U/L (ref 13–39)
BASOPHILS # BLD AUTO: 0.07 THOUSANDS/ΜL (ref 0–0.1)
BASOPHILS NFR BLD AUTO: 1 % (ref 0–1)
BILIRUB SERPL-MCNC: 0.71 MG/DL (ref 0.2–1)
BUN SERPL-MCNC: 13 MG/DL (ref 5–25)
CALCIUM SERPL-MCNC: 9.8 MG/DL (ref 8.4–10.2)
CHLORIDE SERPL-SCNC: 102 MMOL/L (ref 96–108)
CHOLEST SERPL-MCNC: 234 MG/DL (ref ?–200)
CO2 SERPL-SCNC: 27 MMOL/L (ref 21–32)
CREAT SERPL-MCNC: 0.68 MG/DL (ref 0.6–1.3)
EOSINOPHIL # BLD AUTO: 0.85 THOUSAND/ΜL (ref 0–0.61)
EOSINOPHIL NFR BLD AUTO: 15 % (ref 0–6)
ERYTHROCYTE [DISTWIDTH] IN BLOOD BY AUTOMATED COUNT: 14.4 % (ref 11.6–15.1)
EST. AVERAGE GLUCOSE BLD GHB EST-MCNC: 134 MG/DL
GFR SERPL CREATININE-BSD FRML MDRD: 83 ML/MIN/1.73SQ M
GLUCOSE P FAST SERPL-MCNC: 106 MG/DL (ref 65–99)
HBA1C MFR BLD: 6.3 %
HCT VFR BLD AUTO: 43.2 % (ref 34.8–46.1)
HDLC SERPL-MCNC: 79 MG/DL
HGB BLD-MCNC: 13.5 G/DL (ref 11.5–15.4)
IMM GRANULOCYTES # BLD AUTO: 0.02 THOUSAND/UL (ref 0–0.2)
IMM GRANULOCYTES NFR BLD AUTO: 0 % (ref 0–2)
LDLC SERPL CALC-MCNC: 133 MG/DL (ref 0–100)
LYMPHOCYTES # BLD AUTO: 1.57 THOUSANDS/ΜL (ref 0.6–4.47)
LYMPHOCYTES NFR BLD AUTO: 27 % (ref 14–44)
MCH RBC QN AUTO: 28.3 PG (ref 26.8–34.3)
MCHC RBC AUTO-ENTMCNC: 31.3 G/DL (ref 31.4–37.4)
MCV RBC AUTO: 91 FL (ref 82–98)
MONOCYTES # BLD AUTO: 0.37 THOUSAND/ΜL (ref 0.17–1.22)
MONOCYTES NFR BLD AUTO: 6 % (ref 4–12)
NEUTROPHILS # BLD AUTO: 2.87 THOUSANDS/ΜL (ref 1.85–7.62)
NEUTS SEG NFR BLD AUTO: 51 % (ref 43–75)
NONHDLC SERPL-MCNC: 155 MG/DL
NRBC BLD AUTO-RTO: 0 /100 WBCS
PLATELET # BLD AUTO: 224 THOUSANDS/UL (ref 149–390)
PMV BLD AUTO: 11.7 FL (ref 8.9–12.7)
POTASSIUM SERPL-SCNC: 4 MMOL/L (ref 3.5–5.3)
PROT SERPL-MCNC: 7.2 G/DL (ref 6.4–8.4)
RBC # BLD AUTO: 4.77 MILLION/UL (ref 3.81–5.12)
SODIUM SERPL-SCNC: 140 MMOL/L (ref 135–147)
TRIGL SERPL-MCNC: 108 MG/DL (ref ?–150)
WBC # BLD AUTO: 5.75 THOUSAND/UL (ref 4.31–10.16)

## 2025-01-28 PROCEDURE — 36415 COLL VENOUS BLD VENIPUNCTURE: CPT

## 2025-01-28 PROCEDURE — 80061 LIPID PANEL: CPT

## 2025-01-28 PROCEDURE — 85025 COMPLETE CBC W/AUTO DIFF WBC: CPT

## 2025-01-28 PROCEDURE — 80053 COMPREHEN METABOLIC PANEL: CPT

## 2025-01-28 PROCEDURE — 82306 VITAMIN D 25 HYDROXY: CPT

## 2025-01-28 PROCEDURE — 83036 HEMOGLOBIN GLYCOSYLATED A1C: CPT

## 2025-01-30 ENCOUNTER — RESULTS FOLLOW-UP (OUTPATIENT)
Dept: FAMILY MEDICINE CLINIC | Facility: CLINIC | Age: 80
End: 2025-01-30

## 2025-02-03 ENCOUNTER — TELEPHONE (OUTPATIENT)
Age: 80
End: 2025-02-03

## 2025-02-07 DIAGNOSIS — M25.562 CHRONIC PAIN OF LEFT KNEE: Primary | Chronic | ICD-10-CM

## 2025-02-07 DIAGNOSIS — G89.29 CHRONIC PAIN OF LEFT KNEE: Primary | Chronic | ICD-10-CM

## 2025-02-10 ENCOUNTER — APPOINTMENT (OUTPATIENT)
Dept: RADIOLOGY | Facility: CLINIC | Age: 80
End: 2025-02-10
Payer: COMMERCIAL

## 2025-02-10 ENCOUNTER — OFFICE VISIT (OUTPATIENT)
Dept: OBGYN CLINIC | Facility: CLINIC | Age: 80
End: 2025-02-10
Payer: COMMERCIAL

## 2025-02-10 VITALS — HEIGHT: 59 IN | BODY MASS INDEX: 32.86 KG/M2 | WEIGHT: 163 LBS

## 2025-02-10 DIAGNOSIS — M17.12 PRIMARY OSTEOARTHRITIS OF LEFT KNEE: ICD-10-CM

## 2025-02-10 DIAGNOSIS — M25.511 RIGHT SHOULDER PAIN, UNSPECIFIED CHRONICITY: ICD-10-CM

## 2025-02-10 DIAGNOSIS — M24.811 INTERNAL DERANGEMENT OF RIGHT SHOULDER: Primary | ICD-10-CM

## 2025-02-10 PROCEDURE — 99204 OFFICE O/P NEW MOD 45 MIN: CPT | Performed by: STUDENT IN AN ORGANIZED HEALTH CARE EDUCATION/TRAINING PROGRAM

## 2025-02-10 PROCEDURE — 20610 DRAIN/INJ JOINT/BURSA W/O US: CPT | Performed by: STUDENT IN AN ORGANIZED HEALTH CARE EDUCATION/TRAINING PROGRAM

## 2025-02-10 PROCEDURE — 73030 X-RAY EXAM OF SHOULDER: CPT

## 2025-02-10 RX ADMIN — METHYLPREDNISOLONE ACETATE 1 ML: 40 INJECTION, SUSPENSION INTRA-ARTICULAR; INTRALESIONAL; INTRAMUSCULAR; SOFT TISSUE at 14:00

## 2025-02-10 RX ADMIN — BUPIVACAINE HYDROCHLORIDE 4 ML: 2.5 INJECTION, SOLUTION INFILTRATION; PERINEURAL at 14:00

## 2025-02-10 RX ADMIN — LIDOCAINE HYDROCHLORIDE 4 ML: 20 INJECTION, SOLUTION INFILTRATION; PERINEURAL at 14:00

## 2025-02-10 NOTE — PROGRESS NOTES
Ortho Sports Medicine Knee New Patient Visit     Assesment:   79 y.o. female with left knee pain and right shoulder pain    Plan:  Reviewed history, physical exam, and imaging with the patient at time of visit.  Discussed with the patient that her left knee pain may be due to early degenerative disease versus possible meniscus pathology.  I discussed potential treatment option including medications, physical therapy, and injections.  Patient preferred to start with physical therapy at this point.  She is unable to take anti-inflammatories as it does irritate her stomach.  She would like to defer on the injection today.  For her shoulder, I discussed that her symptoms could potentially be due to calcific tendinitis versus rotator cuff tendinopathy.  I discussed potential treatment options occluding physical therapy, medications, injections.  Patient states that she is having more significant pain in her shoulder and prefer to try a corticosteroid injection today along with physical therapy.  I did discuss the risk of injection including infection, flare reaction, increase in blood glucose.  I did perform a corticosteroid injection to the subacromial space of the right shoulder.  The patient tolerated the injection well with no adverse reaction.  I did provide her with physical therapy prescription for both the knee and shoulder.  I discussed that she can follow-up in 6 weeks if symptoms persist. The patient demonstrated understanding of the discussion and was in agreement with the plan.  All of the questions were answered.  Patient can reach out to clinic with any questions or concerns at any time.      Follow up:  Return in about 6 weeks (around 3/24/2025).        Chief Complaint   Patient presents with    Left Knee - Pain       History of Present Illness:  The patient is a 79 y.o. female seen in clinic for left knee pain and right shoulder pain.  Patient states that she has had left knee pain for 2 to 3 months  without any specific injury or inciting event.  She localizes pain over the anterior medial aspects of the knee.  She states that her symptoms are associated with stiffness, popping, and crepitus.  She states that walking and weightbearing aggravate her symptoms.  She has tried turmeric with some relief.  She is not taking any anti-inflammatories as it does irritate her stomach.  Patient states her symptoms are worsening.  She denies any prior surgeries or injuries to the knee.  Patient also states that she is having right shoulder pain localized over the lateral aspect of the shoulder.  She states it is worse with overhead movements.  She describes it as a pinching sensation.  She denies any neck pain or radicular symptoms.    The patient has the following co-morbidities: prediabetes (most recent A1C 6.3 from 1/28/2025), CKD, sciatica, chronic low back pain, opioid dependence    Knee Surgical History:  None    Past Medical, Social and Family History:  Past Medical History:   Diagnosis Date    Abnormal electrocardiogram     last assessed 1/8/16    Chronic kidney disease     Kidney stone     Pneumonia of both lower lobes due to infectious organism 1/20/2024    Primary osteoarthritis involving multiple joints 11/7/2019    Shingles rash 3/4/2024     Past Surgical History:   Procedure Laterality Date    EYE SURGERY      TONSILLECTOMY      TOOTH EXTRACTION      TUBAL LIGATION       Allergies   Allergen Reactions    Influenza Virus Vaccine Swelling    Doxycycline Rash     Current Outpatient Medications on File Prior to Visit   Medication Sig Dispense Refill    cholecalciferol (VITAMIN D) 400 units/mL Take 400 Units by mouth daily      Multiple Minerals-Vitamins (CALCIUM-MAGNESIUM-ZINC-D3 PO) Take 1 tablet by mouth in the morning       No current facility-administered medications on file prior to visit.     Social History     Socioeconomic History    Marital status: /Civil Union     Spouse name: Not on file     Number of children: Not on file    Years of education: Not on file    Highest education level: Not on file   Occupational History    Occupation: Retired   Tobacco Use    Smoking status: Former     Types: Cigarettes     Passive exposure: Never    Smokeless tobacco: Never   Vaping Use    Vaping status: Never Used   Substance and Sexual Activity    Alcohol use: Yes     Comment: occasional    Drug use: No    Sexual activity: Not Currently   Other Topics Concern    Not on file   Social History Narrative    RETIRED     Social Drivers of Health     Financial Resource Strain: Low Risk  (5/31/2023)    Overall Financial Resource Strain (CARDIA)     Difficulty of Paying Living Expenses: Not very hard   Food Insecurity: No Food Insecurity (1/27/2025)    Hunger Vital Sign     Worried About Running Out of Food in the Last Year: Never true     Ran Out of Food in the Last Year: Never true   Transportation Needs: No Transportation Needs (1/27/2025)    PRAPARE - Transportation     Lack of Transportation (Medical): No     Lack of Transportation (Non-Medical): No   Physical Activity: Not on file   Stress: Not on file   Social Connections: Not on file   Intimate Partner Violence: Not on file   Housing Stability: Low Risk  (1/27/2025)    Housing Stability Vital Sign     Unable to Pay for Housing in the Last Year: No     Number of Times Moved in the Last Year: 0     Homeless in the Last Year: No         I have reviewed the past medical, surgical, social and family history, medications and allergies as documented in the EMR.    Review of systems: ROS is negative other than that noted in the HPI.  Constitutional: Negative for fatigue and fever.   HENT: Negative for sore throat.    Respiratory: Negative for shortness of breath.    Cardiovascular: Negative for chest pain.   Gastrointestinal: Negative for abdominal pain.   Endocrine: Negative for cold intolerance and heat intolerance.   Genitourinary: Negative for flank pain.  "  Musculoskeletal: Negative for back pain.   Skin: Negative for rash.   Allergic/Immunologic: Negative for immunocompromised state.   Neurological: Negative for dizziness.   Psychiatric/Behavioral: Negative for agitation.      Physical Exam:    Height 4' 11\" (1.499 m), weight 73.9 kg (163 lb).    General/Constitutional: NAD, well developed, well nourished  HENT: Normocephalic, atraumatic  CV: Intact distal pulses, regular rate  Resp: No respiratory distress or labored breathing  Neuro: Alert and Oriented x 3  Psych: Normal mood, normal affect, normal judgement, normal behavior  Skin: Warm, dry, no rashes, no erythema      Focused left knee exam:  Ambulates with a antalgic gait.    Skin is intact. No evidence of ecchymosis, erythema, gross deformity or previous surgical incisions. negative effusion.     Palpation of the knee demonstrates no tenderness over the medial patellar facet, lateral patellar facet, tibial tubercle or patellar tendon.  There is no tenderness over the pes anserine bursa.  There is no tenderness over Gerdy's tubercle. There is no tenderness in the popliteal fossa.  There is no tenderness over the medial or lateral epicondyle.  There is no tenderness with palpation over the posterior calf without palpable cords.    Range of motion testing demonstrates that the patient is able to achieve 0 degrees of extension and 115 degrees of flexion. There is pain with hyperflexion or hyperextension.  There is negative patellar crepitus. The patient is able to do a straight leg raise.      Strength testing demonstrates 5/5 strength with hip flexion, 5/5 knee extension, 5/5 knee flexion, and 5/5 dorsiflexion and plantarflexion.    Provocation testing demonstrates  Mensicus- negative medial joint line tenderness, negative lateral joint line tenderness, negative Yenny's, negative flexion compression.  Collateral ligaments- negative varus laxity at full extension and in 30° of knee flexion, negative valgus " laxity at full extension and in 30° of knee flexion.  Cruciate ligament- 1A Lachman examination, negative pivot shift test, 1A anterior drawer, 1A posterior drawer, negative posterior sag.  Patella- negative apprehension with lateral patellar translation (able to sublux 2 quadrant with firm endpoint), negative apprehension with medial patellar translation (able to sublux 2 quadrant with firm endpoint), negative J-sign, negative patellar grind test, negative tight lateral patellar tilt.    Range of motion testing of the hip and ankle are within normal limits.    No calf tenderness to palpation bilaterally    LE NV Exam: +2 DP/PT pulses bilaterally  Sensation intact to light touch L2-S1 bilaterally, SPN/DPN/TA motor intact       Focused right shoulder exam:  No paracervical tenderness.   No cervical tenderness.   No pain with neck flexion, extension, side-to-side bending, or rotation.   Negative Spurling's bilaterally.    The skin is intact without evidence of erythema or ecchymosis. Symmetric shoulders with no evidence of supraspinatus or infraspinatus muscle atrophy. There is no evidence neurologic medial or lateral scapular winging. Normal scapular positioning.    Palpation demonstrates no tenderness over the SC joint, bilateral clavicle, lateral aspect of the acromion or posterior joint line, AC joint, or bicipital groove.    Shoulder ROM demonstrates 120 degrees of active and 150 degrees of passive forward elevation. External rotation with the arms at the side demonstrates 60 degree of active and 60 degrees of passive motion.  Internal rotation is to L4. Crepitus noted with range of motion.     Strength testing demonstrates 5/5 strength in empty can position, 4+/5 with resisted external rotation with the arm at the side.  5/5 strength of the subscapularis and a negative belly press.  Hornblower sign is negative, external rotation lag sign is negative.     Provocative testing for the following  demonstrate-  Impingement- negative Hawkin's impingement test, negative Neer impingement sign.  Biceps- negative [place], negative Speeds test.  Stability- negative apprehension sign and a negative relocation test, negative anterior load and shift, negative posterior load and shift testing, negative Aisha test and a negative Jerk test.  Labrum- negative [place] test, negative sulcus sign      UE NV Exam: +2 Radial pulses bilaterally. Fingers are warm and well-perfused.  Sensation intact to light touch C5-T1 bilaterally, Radial/median/ulnar nerve motor intact       Knee Imaging    X-rays of the left knee were obtained on 1/27/2025 and reviewed with the patient. Per my independent review, the imaging shows no evidence of fracture, dislocation, or significant degenerative disease.      Shoulder Imaging    X-rays of the right shoulder were obtained on 2/10/2025 and reviewed with the patient. Per my independent review, the imaging shows no evidence of fracture, dislocation, or significant degenerative disease.  There are small calcifications noted along the lateral aspect of the humeral head which may represent calcific tendinitis.      Large joint arthrocentesis: R subacromial bursa  Universal Protocol:  Consent: Verbal consent obtained.  Consent given by: patient  Patient understanding: patient states understanding of the procedure being performed  Site marked: the operative site was marked  Radiology Images displayed and confirmed. If images not available, report reviewed: imaging studies available  Patient identity confirmed: verbally with patient  Supporting Documentation  Indications: pain   Procedure Details  Location: shoulder - R subacromial bursa  Needle gauge: 21 G.  Ultrasound guidance: no  Medications administered: 1 mL methylPREDNISolone acetate 40 mg/mL; 4 mL lidocaine 2 %; 4 mL bupivacaine 0.25 %    Patient tolerance: patient tolerated the procedure well with no immediate complications  Dressing:  Sterile  dressing applied             Scribe Attestation      I,:  Grant Vieyra PA-C am acting as a scribe while in the presence of the attending physician.:       I,:  Carlos Andres MD personally performed the services described in this documentation    as scribed in my presence.:

## 2025-02-17 RX ORDER — LIDOCAINE HYDROCHLORIDE 20 MG/ML
4 INJECTION, SOLUTION INFILTRATION; PERINEURAL
Status: COMPLETED | OUTPATIENT
Start: 2025-02-10 | End: 2025-02-10

## 2025-02-17 RX ORDER — METHYLPREDNISOLONE ACETATE 40 MG/ML
1 INJECTION, SUSPENSION INTRA-ARTICULAR; INTRALESIONAL; INTRAMUSCULAR; SOFT TISSUE
Status: COMPLETED | OUTPATIENT
Start: 2025-02-10 | End: 2025-02-10

## 2025-02-17 RX ORDER — BUPIVACAINE HYDROCHLORIDE 2.5 MG/ML
4 INJECTION, SOLUTION INFILTRATION; PERINEURAL
Status: COMPLETED | OUTPATIENT
Start: 2025-02-10 | End: 2025-02-10

## 2025-02-19 NOTE — PROGRESS NOTES
PT Evaluation     Today's date: 2025  Patient name: Arnel Jensen  : 1945  MRN: [id number]  Referring provider: Grant Vieyra P*  Dx:   Encounter Diagnosis     ICD-10-CM    1. Internal derangement of right shoulder  M24.811       2. Primary osteoarthritis of left knee  M17.12 Ambulatory Referral to Physical Therapy          Start Time: 1330  Stop Time: 1430  Total time in clinic (min): 60 minutes    Assessment  Impairments: abnormal or restricted ROM, activity intolerance, impaired physical strength, lacks appropriate home exercise program, pain with function, participation limitations and activity limitations  Symptom irritability: moderate    Assessment details: Arnel Jensen is a 79 y.o. female presenting to outpatient physical therapy with noted impairments including L knee and R shoulder pain, impaired hamstring/gastrocnemius and UE soft tissue extensibility, reduced L knee and R shoulder range of motion, reduced L knee/hip and RUE/periscapular strength, and reduced activity tolerance. Pain reported with all overhead motions of RUE that is impacting performance of overhead activities, lifting, and performing IADLs like washing hair. Noting significant tightness of R UT and global tenderness. Noting weakness RUE>LUE that is also impacting all ADLs/IADLs and lifting. Noting pain in L knee, L hip, and L groin. Pain with L knee flexion/extension arc. Weakness of L hip, glutes, and knee with MMT testing. She also is tender to medial joint line. Discussed with pt how weakness around these joints can lead to impaired gait pattern thus leading to impaired force distribution thus may be contributing to symptoms. Pt understanding. No red flags present per exam today. Due to noted impairments, the patient's present functional limitations include difficulty with ADLs/IADLs, reliance on medication and/or modalities for pain relief, reduced tolerance for performance of stairs and  "walking/standing long periods, difficulty reaching overhead/lifting, and difficulty completing HH responsibilities and hobbies. Patient to benefit from skilled outpatient physical therapy  2x/week for 6-8 weeks in order to reduce pain, maximize pain free  range of motion, increase  strength and stability, and improve functional mobility/functional activity in order to maximize function with reduced limitations. Home exercise program was provided. All questions answered to patient's level of satisfaction. Thank you for your referral.        Understanding of Dx/Px/POC: good     Prognosis: good    Goals  STGs to be achieved in 4 weeks:  1. Pt to demonstrate reduced subjective pain rating \"at worst\" by at least 2-3 points from Initial Eval in order to allow for reduced pain with ADLs and improved functional activity tolerance.   2. Pt to demonstrate increased AROM of R shoulder in all deficient planes by at least 5-10 degrees in order to allow for greater ease and independence with ADLs and functional mobility.   3. Pt will report decrease in L knee symptoms by at least 25% to facilitate improved function.   4. Pt to demonstrate increased MMT of R shoulder flexion, abduction, and IR/ER by at least 1/2-1 grade in order to improve safety and stability with ADLs and functional mobility.   5. Pt to improve L knee PROM/AROM by at least 5-10 degrees in order to allow for greater ease and independence with ADLs and functional mobility.    6. Pt to demonstrate increased MMT of L knee flexion and extension by at least 1/2-1 grade in order to improve safety and stability with ADLs and functional mobility.     LTGs to be achieved by discharge:  1. Pt will be I with HEP in order to continue to improve quality of life and independence and reduce risk for re-injury.   2. Pt to demonstrate return to all previous hobbies without limitations or restrictions  to improve QOL.   3. Pt will demonstrate ability to perform stair climbing with " reciprocal pattern  4. Pt to demonstrate normalized gait pattern with no evidence of antalgic gait to demonstrate return to PLOF   5. Pt will be able to reach/lift overhead with RUE without limitations or restrictions.   6. 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           Plan  Patient would benefit from: skilled physical therapy  Planned modality interventions: cryotherapy and thermotherapy: hydrocollator packs    Planned therapy interventions: abdominal trunk stabilization, IASTM, joint mobilization, manual therapy, massage, motor coordination training, neuromuscular re-education, patient/caregiver education, strengthening, stretching, therapeutic activities, therapeutic exercise, home exercise program, flexibility, coordination, body mechanics training, postural training, gait training and balance    Frequency: 1-2x week  Duration in weeks: 8  Plan of Care beginning date: 2/20/2025  Plan of Care expiration date: 4/30/2025  Treatment plan discussed with: patient        Subjective Evaluation    History of Present Illness  Mechanism of injury: Pt is presenting to OPPT with chief complaint of  R shoulder and L knee.   Pt reports that R shoulder pain started with muscle spasms and pain that went down the entire arm that started about 6 months ago. Reports that she gets  a lot of mail and she feels the repetitive opening of mail may have caused the pain. Reports that the pain is located R side of neck, into R shoulder, and into R elbow. Reports that taking hot showers and stretching has helped to relieve some pain, but pain is still there. Reports that pain severity has decreased, but it is still limiting daily activities that involve reaching overhead. Reports more pain with sudden movements. Denies any numbness/tingling. Reports she was seen by orthopedics on 2/10/25 and received steroid injection that helped to relieve pain.    Pt reports that L knee pain has been happening since September  2024. Reports that this pain has started gradually. Reports that it has led to hip/knee pain due to walking differently. Reports that she has to hold onto railing with steps and perform 1 step at a time due to balance and pain levels. Reports that pain is worse with walking/standing and stair climbing. Also reports pain with sitting for long period due to stiffness. Reports that it is also hard to turn quick. Reports pain in quadriceps as well. Reports she as also getting cramps. Denies any numbness/tingling. Denies any ELSA. Reports that knee pain started after seeing x-ray that showed her scoliosis and she feels that mentally this may have caused her pain.               Recurrent probem    Quality of life: good    Patient Goals  Patient goals for therapy: decreased pain, increased motion, increased strength, independence with ADLs/IADLs and return to sport/leisure activities  Patient goal: decrease pain, improve my function, get back to walking/riding bike  Pain  Pain scale: R shoulder- 6/10 L knee- 7-8/10.  Pain scale at highest: R shoulder and L knee- 10/10.  Location: L knee, R shoulder  Quality: sharp, radiating, dull ache, discomfort and throbbing  Aggravating factors: overhead activity, lifting, standing, walking and stair climbing    Social Support  Stairs in house: yes   Lives in: multiple-level home  Lives with: alone    Employment status: not working  Hand dominance: right    Treatments  Current treatment: physical therapy        Objective     Postural Observations  Seated posture: fair  Standing posture: fair      Tenderness     Right Shoulder  Tenderness in the AC joint, biceps tendon (proximal) and bicipital groove. No tenderness in the infraspinatus tendon, subscapularis tendon and supraspinatus tendon.   Left Knee   Tenderness in the medial joint line and pes anserinus. No tenderness in the lateral joint line, LCL (distal), LCL (proximal), MCL (distal), MCL (proximal), patellar tendon and popliteal  fossa.     Cervical/Thoracic Screen   Cervical range of motion within normal limits  Thoracic range of motion within normal limits with the following exceptions: Moderately impaired thoracic ext and rotation//SB    Neurological Testing     Sensation     Shoulder   Left Shoulder   Intact: light touch    Right Shoulder   Intact: Light touch    Reflexes   Left   Wright's reflex: negative    Right   Wright's reflex: negative    Active Range of Motion     Right Shoulder   Flexion: 132 degrees with pain  Abduction: 120 degrees with pain  External rotation BTH: C2 with pain  Internal rotation BTB: L4 with pain    Lumbar   Flexion:  Restriction level: minimal  Extension:  Restriction level: maximal  Left lateral flexion:  Restriction level: moderate  Right lateral flexion:  Restriction level: maximal  Left Knee   Flexion: 122 degrees with pain  Extension: -5 degrees with pain    Passive Range of Motion     Right Shoulder   Normal passive range of motion    Strength/Myotome Testing     Left Shoulder     Planes of Motion   Flexion: 5   Extension: 5   Abduction: 5   External rotation at 0°: 5   Internal rotation at 0°: 5     Isolated Muscles   Biceps: 4+   Lower trapezius: 4-   Middle trapezius: 4-     Right Shoulder     Planes of Motion   Flexion: 4-   Extension: 4+   Abduction: 4-   External rotation at 0°: 4-   Internal rotation at 0°: 4     Isolated Muscles   Biceps: 4+   Lower trapezius: 4-   Middle trapezius: 4-     Lumbar   Left   Heel walk: normal  Toe walk: normal    Right   Heel walk: normal  Toe walk: normal    Left Hip   Planes of Motion   Flexion: 4-  Extension: 4-  Abduction: 4-  Adduction: 4  External rotation: 4  Internal rotation: 4    Right Hip   Planes of Motion   Flexion: 4  Extension: 4-  Abduction: 4-  Adduction: 4  External rotation: 4  Internal rotation: 4    Left Knee   Flexion: 4-  Extension: 4-    Right Knee   Flexion: 4  Extension: 4    Left Ankle/Foot   Dorsiflexion: 4+    Right Ankle/Foot    Dorsiflexion: 4+    Tests     Right Shoulder   Positive Hawkin's, painful arc and passive horizontal adduction.   Negative belly press, drop arm, empty can, external rotation lag sign, full can, internal rotation lag sign and Neer's.     Lumbar     Left   Positive passive SLR and slump test.   Negative crossed SLR.     Left Hip   Positive KIRSTEN.     Left Knee   Positive valgus stress test at 0 degrees and valgus stress test at 30 degrees.   Negative anterior drawer, lateral Yenny, medial Yenny, posterior drawer, varus stress test at 0 degrees and varus stress test at 30 degrees.     Functional Assessment      Squat    Left valgus and right valgus.                Date 2/20/2025        Visit Count 1       FOTO 2/20/2025        Pain In See IE       Pain Out See IE           Manuals 2/20/2025        PROM R shoulder                                 Neuro Re-Ed        Tandem stance        Tandem walking        MTP/LTP        Ball circles CW/CCW on wall                                Ther Ex        Nustep with handles        Heel slides        bridges Reviewed HEP       Hamstring stretch        LAQ        clamshells Reviewed HEP       Quad set[ Reviewed HEP       SLR        Calf stretch        Step ups  Step downs         Leg ext/flx        Leg press         Squats -- squat matrix        Lateral lunges        UT/LS stretch Reviewed HEP       Scap retract Reviewed HEP       Shoulder shrugs         [place]        Flx shoulder flex/scap        Open books        Table slides  Reviewed HEP                       Ther Activity                        Gait Training                        Modalities        Cryo

## 2025-02-20 ENCOUNTER — EVALUATION (OUTPATIENT)
Dept: PHYSICAL THERAPY | Facility: CLINIC | Age: 80
End: 2025-02-20
Payer: COMMERCIAL

## 2025-02-20 DIAGNOSIS — M17.12 PRIMARY OSTEOARTHRITIS OF LEFT KNEE: ICD-10-CM

## 2025-02-20 DIAGNOSIS — M24.811 INTERNAL DERANGEMENT OF RIGHT SHOULDER: Primary | ICD-10-CM

## 2025-02-20 PROCEDURE — 97110 THERAPEUTIC EXERCISES: CPT

## 2025-02-20 PROCEDURE — 97161 PT EVAL LOW COMPLEX 20 MIN: CPT

## 2025-02-26 ENCOUNTER — OFFICE VISIT (OUTPATIENT)
Dept: PHYSICAL THERAPY | Facility: CLINIC | Age: 80
End: 2025-02-26
Payer: COMMERCIAL

## 2025-02-26 DIAGNOSIS — M24.811 INTERNAL DERANGEMENT OF RIGHT SHOULDER: ICD-10-CM

## 2025-02-26 DIAGNOSIS — M17.12 PRIMARY OSTEOARTHRITIS OF LEFT KNEE: Primary | ICD-10-CM

## 2025-02-26 PROCEDURE — 97110 THERAPEUTIC EXERCISES: CPT

## 2025-02-26 NOTE — PROGRESS NOTES
"Daily Note     Today's date: 2025  Patient name: Arnel Jensen  : 1945  MRN: [id number]  Referring provider: Grant Vieyra P*  Dx:   Encounter Diagnosis     ICD-10-CM    1. Primary osteoarthritis of left knee  M17.12       2. Internal derangement of right shoulder  M24.811           Start Time: 1330  Stop Time: 1415  Total time in clinic (min): 45 minutes    Subjective: \"I have been doing the exercises at home and they are helping. My shoulder is feeling better with the tables slides. My knee is sore because I went for a walk the other day and the knee and hip is bothering me more than the shoulder\"       Objective: See treatment diary below      Assessment: Tolerated treatment well. Able to complete POC without incident. Focused session on strengthening/ROM of L knee/hip/glutes due to pt reports of more pain/limitations in knee>shoulder. Will add shoulder strengthening/ROM next session. Demonstrating quick muscle fatigue with performance of PREs. Pt experiencing L hip pain that limited ability to perform SLR. Added EOT hip flexor stretch which pt reports helped to relieve some of these symptoms. Noting appropriate depth into squats with appropriate muscle engagement. Will cont to progress as tolerated. Patient demonstrated fatigue post treatment and would benefit from continued PT      Plan: Continue per plan of care.  Progress treatment as tolerated.       Date 2025      Visit Count 1 2      FOTO 2025        Pain In See IE       Pain Out See IE           Manuals 2025      PROM R shoulder                                 Neuro Re-Ed        Tandem stance        Tandem walking        MTP/LTP  NV      Ball circles CW/CCW on wall                                Ther Ex        Nustep with handles  L3 10'       Heel slides        bridges Reviewed HEP       Hamstring stretch  EOT hip flexor stretch 3x30\" with strap       HR  2x10      LAQ        clamshells Reviewed HEP    " "   Quad set Reviewed HEP       SLR        Calf stretch  Supine hip abduction gtb 2x10 3-5\"     Supine hip adduction with ball squeeze 2x10 5\"  hold      Step ups  Step downs         Leg ext/flx        Leg press   2x10 35#      Squats -- squat matrix  2x10      Lateral lunges        UT/LS stretch Reviewed HEP       Scap retract Reviewed HEP       pulleys  NV      Shoulder shrugs   NV      ORVILLE  NV      Flx shoulder flex/scap        Open books        Table slides  Reviewed HEP                       Ther Activity                        Gait Training                        Modalities        Cryo                           "

## 2025-02-27 ENCOUNTER — OFFICE VISIT (OUTPATIENT)
Dept: PHYSICAL THERAPY | Facility: CLINIC | Age: 80
End: 2025-02-27
Payer: COMMERCIAL

## 2025-02-27 DIAGNOSIS — M17.12 PRIMARY OSTEOARTHRITIS OF LEFT KNEE: Primary | ICD-10-CM

## 2025-02-27 DIAGNOSIS — M24.811 INTERNAL DERANGEMENT OF RIGHT SHOULDER: ICD-10-CM

## 2025-02-27 PROCEDURE — 97110 THERAPEUTIC EXERCISES: CPT

## 2025-02-27 NOTE — PROGRESS NOTES
"Daily Note     Today's date: 2025  Patient name: Arnel Jensen  : 1945  MRN: [id number]  Referring provider: Grant Vieyra P*  Dx:   Encounter Diagnosis     ICD-10-CM    1. Primary osteoarthritis of left knee  M17.12       2. Internal derangement of right shoulder  M24.811           Start Time: 1345  Stop Time: 1435  Total time in clinic (min): 50 minutes    Subjective: \"My shoulder and knee hurt, nothing new.\"      Objective: See treatment diary below      Assessment: Tolerated treatment well. Able to complete program without incident.  Able to increase reps on leg press without issues.  Added scapular strengthening with resistance with appropriate fatigue, no pain.  Will continue to monitor and progress as able.  Patient demonstrated fatigue post treatment and would benefit from continued PT      Plan: Continue per plan of care.  Progress treatment as tolerated.         Date 2025     Visit Count 1 2 3     FOTO 2025        Pain In See IE       Pain Out See IE           Manuals 2025     PROM R shoulder                                 Neuro Re-Ed        Tandem stance        Tandem walking        MTP/LTP  NV Red 2x10/3-5\"     Ball circles CW/CCW on wall                                Ther Ex        Nustep with handles  L3 10'  L3 10'      Heel slides        bridges Reviewed HEP       Hamstring stretch  EOT hip flexor stretch 3x30\" with strap  EOT hip flexor stretch 3x30\" with strap      HR  2x10 2x10     LAQ        clamshells Reviewed HEP       Quad set Reviewed HEP       SLR        Calf stretch  Supine hip abduction gtb 2x10 3-5\"     Supine hip adduction with ball squeeze 2x10 5\"  hold Supine hip abduction gtb 2x10 3-5\"     Supine hip adduction with ball squeeze 2x10 5\"  hold     Step ups  Step downs         Leg ext/flx        Leg press   2x10 35# 3x10 35#     Squats -- squat matrix  2x10 2x10     Lateral lunges        UT/LS stretch Reviewed HEP     "   Scap retract Reviewed HEP       pulleys  NV      Shoulder shrugs   NV      ORVILLE  NV      Flx shoulder flex/scap        Open books        Table slides  Reviewed HEP                       Ther Activity                        Gait Training                        Modalities        Cryo

## 2025-03-03 ENCOUNTER — OFFICE VISIT (OUTPATIENT)
Dept: PHYSICAL THERAPY | Facility: CLINIC | Age: 80
End: 2025-03-03
Payer: COMMERCIAL

## 2025-03-03 DIAGNOSIS — M24.811 INTERNAL DERANGEMENT OF RIGHT SHOULDER: ICD-10-CM

## 2025-03-03 DIAGNOSIS — M17.12 PRIMARY OSTEOARTHRITIS OF LEFT KNEE: Primary | ICD-10-CM

## 2025-03-03 PROCEDURE — 97110 THERAPEUTIC EXERCISES: CPT

## 2025-03-03 PROCEDURE — 97112 NEUROMUSCULAR REEDUCATION: CPT

## 2025-03-03 NOTE — PROGRESS NOTES
"Daily Note     Today's date: 3/3/2025  Patient name: Arnel Jensen  : 1945  MRN: [id number]  Referring provider: Grant Vieyra P*  Dx:   Encounter Diagnosis     ICD-10-CM    1. Primary osteoarthritis of left knee  M17.12       2. Internal derangement of right shoulder  M24.811                      Subjective: \"My knee is feeling a little better. I have been limping sometimes. Both of my shoulders are bothering me\"       Objective: See treatment diary below      Assessment: Tolerated treatment well. Able to complete POC without incident. Steady strength and motion gains of L knee and L shoulder. Noting improved depth with squats today demo improving ROM and LE strength. Noting BLE fatigue with performance of STS without HHA. Attempted to add supine SLR but unable due to L hip pain; pain improved after self hip flexor stretches. Noting improved gait pattern at end of session and decrease in symptoms. Pt reporting improved mobility. Will cont to progress as tolerated. Patient demonstrated fatigue post treatment and would benefit from continued PT      Plan: Continue per plan of care.  Progress treatment as tolerated.         Date 2025  2/26 2/27 3/3    Visit Count 1 2 3 4    FOTO 2025        Pain In See IE       Pain Out See IE           Manuals 2025  2/26 2/27 3/3    PROM R shoulder                                 Neuro Re-Ed        Tandem stance        Tandem walking        MTP/LTP  NV Red 2x10/3-5\" Red 2x10/3-5\"     Ball circles CW/CCW on wall            STS x10 no HHA                    Ther Ex        Nustep with handles  L3 10'  L3 10'  L3 10'     Heel slides        bridges Reviewed HEP       Hamstring stretch  EOT hip flexor stretch 3x30\" with strap  EOT hip flexor stretch 3x30\" with strap  EOT hip flexor stretch 3x30\" with strap     HR  2x10 2x10     LAQ        clamshells Reviewed HEP       Quad set Reviewed HEP       SLR        Calf stretch  Supine hip abduction gtb 2x10 3-5\" " "    Supine hip adduction with ball squeeze 2x10 5\"  hold Supine hip abduction gtb 2x10 3-5\"     Supine hip adduction with ball squeeze 2x10 5\"  hold Supine hip abduction gtb 2x10 3-5\"     Supine hip adduction with ball squeeze 2x10 5\"  hold    Step ups  Step downs         Leg ext/flx        Leg press   2x10 35# 3x10 35# 3x10 40#    Squats -- squat matrix  2x10 2x10 2x10    Lateral lunges        UT/LS stretch Reviewed HEP       Scap retract Reviewed HEP       pulleys  NV      Shoulder shrugs   NV      ORVILLE  NV      Flx shoulder flex/scap        Open books        Table slides  Reviewed HEP                       Ther Activity                        Gait Training                        Modalities        Cryo                               "

## 2025-03-06 ENCOUNTER — OFFICE VISIT (OUTPATIENT)
Dept: PHYSICAL THERAPY | Facility: CLINIC | Age: 80
End: 2025-03-06
Payer: COMMERCIAL

## 2025-03-06 DIAGNOSIS — M24.811 INTERNAL DERANGEMENT OF RIGHT SHOULDER: ICD-10-CM

## 2025-03-06 DIAGNOSIS — M17.12 PRIMARY OSTEOARTHRITIS OF LEFT KNEE: Primary | ICD-10-CM

## 2025-03-06 PROCEDURE — 97112 NEUROMUSCULAR REEDUCATION: CPT

## 2025-03-06 PROCEDURE — 97110 THERAPEUTIC EXERCISES: CPT

## 2025-03-06 NOTE — PROGRESS NOTES
"Daily Note     Today's date: 3/6/2025  Patient name: Arnel Jensen  : 1945  MRN: [id number]  Referring provider: Grant Vieyra P*  Dx:   Encounter Diagnosis     ICD-10-CM    1. Primary osteoarthritis of left knee  M17.12       2. Internal derangement of right shoulder  M24.811           Start Time: 1350  Stop Time: 1445  Total time in clinic (min): 55 minutes    Subjective: \"I am sore today from the exercises. The front of my right shoulder is sore and I do not know why.  The stretch with your leg off the side killed me.\"       Objective: See treatment diary below      Assessment: Tolerated treatment well. Added shoulder AROM with good tolerance.  Held quad stretch 2* increased discomfort from last night.  No change in symptoms at end of session.  Will continue to monitor and progress as able.  Patient demonstrated fatigue post treatment and would benefit from continued PT      Plan: Continue per plan of care.  Progress treatment as tolerated.         Date 2025  2/26 2/27 3/3 3/6   Visit Count 1 2 3 4 5   FOTO 2025        Pain In See IE       Pain Out See IE           Manuals 2025  2/26 2/27 3/3 3/6   PROM R shoulder                                 Neuro Re-Ed        Tandem stance        Tandem walking        MTP/LTP  NV Red 2x10/3-5\" Red 2x10/3-5\"  Red 2x10/3-5\"   Ball circles CW/CCW on wall            STS x10 no HHA                    Ther Ex        Nustep with handles  L3 10'  L3 10'  L3 10'  L3 10'    Heel slides        bridges Reviewed HEP       Hamstring stretch  EOT hip flexor stretch 3x30\" with strap  EOT hip flexor stretch 3x30\" with strap  EOT hip flexor stretch 3x30\" with strap  held   HR  2x10 2x10     LAQ        clamshells Reviewed HEP       Quad set Reviewed HEP       SLR        Calf stretch  Supine hip abduction gtb 2x10 3-5\"     Supine hip adduction with ball squeeze 2x10 5\"  hold Supine hip abduction gtb 2x10 3-5\"     Supine hip adduction with ball squeeze 2x10 5\" " " hold Supine hip abduction gtb 2x10 3-5\"     Supine hip adduction with ball squeeze 2x10 5\"  hold Supine hip abduction gtb 2x10 3-5\"     Supine hip adduction with ball squeeze 2x10 5\"  hold   Step ups  Step downs         Leg ext/flx        Leg press   2x10 35# 3x10 35# 3x10 40#    Squats -- squat matrix  2x10 2x10 2x10 2x10   Lateral lunges        UT/LS stretch Reviewed HEP       Scap retract Reviewed HEP    2x10   pulleys  NV   Flex 2'   Shoulder shrugs   NV   2x10   ORVILLE  NV   Red 2x10   Flx shoulder flex/scap        Open books        Table slides  Reviewed HEP                       Ther Activity                        Gait Training                        Modalities        Cryo                                 "

## 2025-03-07 ENCOUNTER — TELEPHONE (OUTPATIENT)
Dept: OBGYN CLINIC | Facility: CLINIC | Age: 80
End: 2025-03-07

## 2025-03-10 ENCOUNTER — OFFICE VISIT (OUTPATIENT)
Dept: PHYSICAL THERAPY | Facility: CLINIC | Age: 80
End: 2025-03-10
Payer: COMMERCIAL

## 2025-03-10 DIAGNOSIS — M24.811 INTERNAL DERANGEMENT OF RIGHT SHOULDER: ICD-10-CM

## 2025-03-10 DIAGNOSIS — M17.12 PRIMARY OSTEOARTHRITIS OF LEFT KNEE: Primary | ICD-10-CM

## 2025-03-10 PROCEDURE — 97140 MANUAL THERAPY 1/> REGIONS: CPT

## 2025-03-10 PROCEDURE — 97110 THERAPEUTIC EXERCISES: CPT

## 2025-03-10 NOTE — PROGRESS NOTES
"Daily Note     Today's date: 3/10/2025  Patient name: Arnel Jensen  : 1945  MRN: [id number]  Referring provider: Grant Vieyra P*  Dx:   Encounter Diagnosis     ICD-10-CM    1. Primary osteoarthritis of left knee  M17.12       2. Internal derangement of right shoulder  M24.811           Start Time: 1515  Stop Time: 1600  Total time in clinic (min): 45 minutes    Subjective: \"I had a lot of soreness and pain after last session that I had to take flexeril. I will do what I can do today but I am really sore and have more pain in my hips\"       Objective: See treatment diary below      Assessment: Tolerated treatment fair. Able to complete POC without incident. Pt arriving to session in increased pain; reporting pain in B groin region and back. Performed self stretches for hip flexor with good tolerance and slight improvement in symptoms. Performed PROM hip abduction due to noted tightness in hip adductors; noting improvement in tissue extensibility. Pt performed prone on elbows to assess symptoms; noting improvement in tissue tightness. Pt demo improvement in gait pattern at end of session but still experiencing groin pain; reporting prone on elbows mildly improved symptoms. Will cont to progress as tolerated and will assess symptoms next visit. Patient demonstrated fatigue post treatment and would benefit from continued PT      Plan: Continue per plan of care.  Progress treatment as tolerated.         Date 3/10 2/26 2/27 3/3 3/6   Visit Count 6 2 3 4 5   FOTO 3/10       Pain In        Pain Out            Manuals 3/10 2/26 2/27 33 3/6   PROM R shoulder  PROM L hip into abduction/adductor stretch 8'                                Neuro Re-Ed        Tandem stance        Tandem walking        MTP/LTP  NV Red 2x10/3-5\" Red 2x10/3-5\"  Red 2x10/3-5\"   Ball circles CW/CCW on wall            STS x10 no HHA                    Ther Ex        Nustep with handles L3 10'  L3 10'  L3 10'  L3 10'  L3 10'  " "  Heel slides        bridges 2x10 3-5\"        Hamstring stretch EOT hip flexor stretch no strap 3x1 min EOT hip flexor stretch 3x30\" with strap  EOT hip flexor stretch 3x30\" with strap  EOT hip flexor stretch 3x30\" with strap  held   HR  2x10 2x10     LAQ        clamshells        Quad set        SLR        Calf stretch  Supine hip abduction gtb 2x10 3-5\"     Supine hip adduction with ball squeeze 2x10 5\"  hold Supine hip abduction gtb 2x10 3-5\"     Supine hip adduction with ball squeeze 2x10 5\"  hold Supine hip abduction gtb 2x10 3-5\"     Supine hip adduction with ball squeeze 2x10 5\"  hold Supine hip abduction gtb 2x10 3-5\"     Supine hip adduction with ball squeeze 2x10 5\"  hold   Step ups  Step downs         Leg ext/flx        Leg press  30# 2x10 2x10 35# 3x10 35# 3x10 40#    Squats -- squat matrix  2x10 2x10 2x10 2x10   Lateral lunges        UT/LS stretch        Scap retract     2x10   pulleys  NV   Flex 2'   Shoulder shrugs   NV   2x10   ORVILLE  NV   Red 2x10   Flx shoulder flex/scap        Open books        Table slides         Prone press up X15 MARIIA               Ther Activity                        Gait Training                        Modalities        Cryo                                   "

## 2025-03-13 ENCOUNTER — OFFICE VISIT (OUTPATIENT)
Dept: PHYSICAL THERAPY | Facility: CLINIC | Age: 80
End: 2025-03-13
Payer: COMMERCIAL

## 2025-03-13 DIAGNOSIS — M24.811 INTERNAL DERANGEMENT OF RIGHT SHOULDER: ICD-10-CM

## 2025-03-13 DIAGNOSIS — M17.12 PRIMARY OSTEOARTHRITIS OF LEFT KNEE: Primary | ICD-10-CM

## 2025-03-13 PROCEDURE — 97110 THERAPEUTIC EXERCISES: CPT

## 2025-03-13 NOTE — PROGRESS NOTES
"Daily Note     Today's date: 3/13/2025  Patient name: Arnel Jensen  : 1945  MRN: [id number]  Referring provider: Grant Vieyra P*  Dx:   Encounter Diagnosis     ICD-10-CM    1. Primary osteoarthritis of left knee  M17.12       2. Internal derangement of right shoulder  M24.811           Start Time: 1345  Stop Time: 1435  Total time in clinic (min): 50 minutes    Subjective: \"I am a little better but I am still hurting.  The front of my thighs really hurt.  I think the exercises loosen me up.  I think my L leg is shirking.\"      Objective: See treatment diary below      Assessment: Tolerated treatment fair. Modified program 2* pt request.  Noted some relief with quad stretch off table, no strap.  Decreased weight on leg press and able to complete.  Noted L leg \"shirking\" however pt compensates performing min squats and weight shifts away from L LE.  Noted increased mobility at end of session, no change in pain levels.  Will continue to monitor and progress as able.  Patient demonstrated fatigue post treatment and would benefit from continued PT      Plan: Continue per plan of care.  Progress treatment as tolerated.         Date 3/10 3/13      Visit Count 6 7      FOTO 3/10       Pain In        Pain Out            Manuals 3/10 3/13      PROM R shoulder  PROM L hip into abduction/adductor stretch 8'  deferred                              Neuro Re-Ed        Tandem stance        Tandem walking        MTP/LTP  Red LTP 2x10      Ball circles CW/CCW on wall                                Ther Ex        Nustep with handles L3 10'  L3 10'       Heel slides        bridges 2x10 3-5\"        Hamstring stretch EOT hip flexor stretch no strap 3x1 min EOT hip flexor stretch 3x30\" without strap       HR        LAQ        clamshells        Quad set        SLR        Calf stretch        Step ups  Step downs         Leg ext/flx        Leg press  30# 2x10 2x10 25#      Squats -- squat matrix  2x10      Lateral " "lunges  LTR 10x10\"      UT/LS stretch        Scap retract  2x10      pulleys  deferred      Shoulder shrugs   20x/3-5\"      ORVILLE  Red 2x10/3-5\"      Flx shoulder flex/scap        Open books        Table slides         Prone press up                Ther Activity                        Gait Training                        Modalities        Cryo                                     "

## 2025-03-17 ENCOUNTER — OFFICE VISIT (OUTPATIENT)
Dept: PHYSICAL THERAPY | Facility: CLINIC | Age: 80
End: 2025-03-17
Payer: COMMERCIAL

## 2025-03-17 DIAGNOSIS — M24.811 INTERNAL DERANGEMENT OF RIGHT SHOULDER: ICD-10-CM

## 2025-03-17 DIAGNOSIS — M17.12 PRIMARY OSTEOARTHRITIS OF LEFT KNEE: Primary | ICD-10-CM

## 2025-03-17 PROCEDURE — 97110 THERAPEUTIC EXERCISES: CPT

## 2025-03-17 NOTE — PROGRESS NOTES
"Daily Note     Today's date: 3/17/2025  Patient name: Arnel Jensen  : 1945  MRN: [id number]  Referring provider: Grant Vieyra P*  Dx:   Encounter Diagnosis     ICD-10-CM    1. Primary osteoarthritis of left knee  M17.12       2. Internal derangement of right shoulder  M24.811           Start Time: 1350  Stop Time: 1435  Total time in clinic (min): 45 minutes    Subjective: \"I still have pain but I can walk.\"      Objective: See treatment diary below      Assessment: Tolerated treatment well. Able to complete program without exacerbation of symptoms.  Pt deferred progressing with exercises 2* fear of increased symptoms.  Continues with groin pain from add sq per pt.  Noted dizziness with position change during session.  Noted woke up with it.  Will monitor.  Will continue to monitor symptoms.  And progress as able.  Patient demonstrated fatigue post treatment and would benefit from continued PT      Plan: Continue per plan of care.  Progress treatment as tolerated.         Date 3/10 3/13 3/17     Visit Count 6 7 8     FOTO 3/10       Pain In        Pain Out            Manuals 3/10 3/13 3/17     PROM R shoulder  PROM L hip into abduction/adductor stretch 8'  deferred                              Neuro Re-Ed        Tandem stance        Tandem walking        MTP/LTP  Red LTP 2x10 Red LTP 2x10     Ball circles CW/CCW on wall                                Ther Ex        Nustep with handles L3 10'  L3 10'  L3 10'      Heel slides        bridges 2x10 3-5\"        Hamstring stretch EOT hip flexor stretch no strap 3x1 min EOT hip flexor stretch 3x30\" without strap  EOT hip flexor stretch 3x30\" without strap      HR        LAQ        clamshells        Quad set        SLR        Calf stretch        Step ups  Step downs         Leg ext/flx        Leg press  30# 2x10 2x10 25# 3x10 25#     Squats -- squat matrix  2x10      Lateral lunges  LTR 10x10\" LTR 10x10\"     UT/LS stretch        Scap retract  2x10 2x10 " "    pulleys  deferred      Shoulder shrugs   20x/3-5\" 20x/3-5\"     ORV[place]LE  Red 2x10/3-5\" Red 2x10/3-5\"     Flx shoulder flex/scap        Open books        Table slides         Prone press up                Ther Activity                        Gait Training                        Modalities        Cryo                                       "

## 2025-03-19 ENCOUNTER — APPOINTMENT (OUTPATIENT)
Dept: PHYSICAL THERAPY | Facility: CLINIC | Age: 80
End: 2025-03-19
Payer: COMMERCIAL

## 2025-03-24 ENCOUNTER — OFFICE VISIT (OUTPATIENT)
Dept: PHYSICAL THERAPY | Facility: CLINIC | Age: 80
End: 2025-03-24
Payer: COMMERCIAL

## 2025-03-24 DIAGNOSIS — M17.12 PRIMARY OSTEOARTHRITIS OF LEFT KNEE: Primary | ICD-10-CM

## 2025-03-24 DIAGNOSIS — M24.811 INTERNAL DERANGEMENT OF RIGHT SHOULDER: ICD-10-CM

## 2025-03-24 PROCEDURE — 97110 THERAPEUTIC EXERCISES: CPT

## 2025-03-24 NOTE — PROGRESS NOTES
"Daily Note     Today's date: 3/24/2025  Patient name: Arnel Jensen  : 1945  MRN: [id number]  Referring provider: Grant Veiyra P*  Dx:   Encounter Diagnosis     ICD-10-CM    1. Primary osteoarthritis of left knee  M17.12       2. Internal derangement of right shoulder  M24.811           Start Time: 1505  Stop Time: 1600  Total time in clinic (min): 55 minutes    Subjective: \"I am walking better.  I still have the pain but it's getting better.  I do the exercises every other day.\"      Objective: See treatment diary below      Assessment: Tolerated treatment well. Able to complete program without incident.  Continues to response with current program.  No change made in exercises per pt request.  Will continue to monitor and progress as able.  Patient demonstrated fatigue post treatment and would benefit from continued PT      Plan: Continue per plan of care.  Progress treatment as tolerated.         Date 3/10 3/13 3/17 3/24    Visit Count 6 7 8 9    FOTO 3/10       Pain In        Pain Out            Manuals 3/10 3/13 3/17 3/24    PROM R shoulder  PROM L hip into abduction/adductor stretch 8'  deferred                              Neuro Re-Ed        Tandem stance        Tandem walking        MTP/LTP  Red LTP 2x10 Red LTP 2x10     Ball circles CW/CCW on wall                                Ther Ex        Nustep with handles L3 10'  L3 10'  L3 10'  L3 15'     Heel slides        bridges 2x10 3-5\"        Hamstring stretch EOT hip flexor stretch no strap 3x1 min EOT hip flexor stretch 3x30\" without strap  EOT hip flexor stretch 3x30\" without strap  EOT hip flexor stretch 3x30\" without strap     HR        LAQ        clamshells        Quad set        SLR        Calf stretch        Step ups  Step downs         Leg ext/flx        Leg press  30# 2x10 2x10 25# 3x10 25# 3x10 25#    Squats -- squat matrix  2x10      Lateral lunges  LTR 10x10\" LTR 10x10\" LTR 10x10\"    UT/LS stretch        Scap retract  2x10 " "2x10 2x10    pulleys  deferred      Shoulder shrugs   20x/3-5\" 20x/3-5\" 20x/3-5\"    ORV[place]  Red 2x10/3-5\" Red 2x10/3-5\" Red 2x10/3-5\"    Flx shoulder flex/scap        Open books        Table slides         Prone press up                Ther Activity                        Gait Training                        Modalities        Cryo                                         "

## 2025-03-27 ENCOUNTER — EVALUATION (OUTPATIENT)
Dept: PHYSICAL THERAPY | Facility: CLINIC | Age: 80
End: 2025-03-27
Payer: COMMERCIAL

## 2025-03-27 DIAGNOSIS — M17.12 PRIMARY OSTEOARTHRITIS OF LEFT KNEE: Primary | ICD-10-CM

## 2025-03-27 DIAGNOSIS — M24.811 INTERNAL DERANGEMENT OF RIGHT SHOULDER: ICD-10-CM

## 2025-03-27 PROCEDURE — 97110 THERAPEUTIC EXERCISES: CPT

## 2025-03-27 NOTE — LETTER
2025    Grant Vieyra PA-C  [address] [address]    Patient: Arnel Jensen   YOB: 1945   Date of Visit: 3/27/2025     Encounter Diagnosis     ICD-10-CM    1. Primary osteoarthritis of left knee  M17.12       2. Internal derangement of right shoulder  M24.811           Dear Dr. Vieyra:    Thank you for your recent referral of Arnel Jensen. Please review the attached evaluation summary from Arnel's recent visit.     Please verify that you agree with the plan of care by signing the attached order.     If you have any questions or concerns, please do not hesitate to call.     I sincerely appreciate the opportunity to share in the care of one of your patients and hope to have another opportunity to work with you in the near future.       Sincerely,    Mauro Belle, PT      Referring Provider:      I certify that I have read the below Plan of Care and certify the need for these services furnished under this plan of treatment while under my care.                    Grant Vieyra PA-C  [address] [address]  Via Fax: [phone]          PT Evaluation     Today's date: 3/27/2025  Patient name: Arnel Jensen  : 1945  MRN: [id number]  Referring provider: Grant Vieyra P*  Dx:   Encounter Diagnosis     ICD-10-CM    1. Primary osteoarthritis of left knee  M17.12       2. Internal derangement of right shoulder  M24.811             Start Time: 1430  Stop Time: 1515  Total time in clinic (min): 45 minutes    Assessment  Impairments: abnormal or restricted ROM, activity intolerance, impaired physical strength, lacks appropriate home exercise program, pain with function, participation limitations and activity limitations  Symptom irritability: moderate    Assessment details: Arnel Jensen has been consistent with attending PT sessions and performing HEP. Since SOC, noting improvement in AROM/PROM  "and strength of R shoulder and L knee. Pt demonstrating and reporting improvement in gait pattern and ability to ambulate for longer distances with less pain. Pt reporting improvement in pain severity in both the L knee and R shoulder with daily activities and hobbies. Improvement in overhead reaching and lifting noted as well with daily activities and with strengthening activities. Pt has been progressing towards achievement of STG/LTG slowly, but steadily. Pt will continue to benefit from skilled PT services 2x/wk for another 4 weeks to address continued impairments that continue to impact overall function, performance of ADLs/IADLs, and mobility. Exam findings were discussed with pt and all questions answered to pt satisfaction.         Understanding of Dx/Px/POC: good     Prognosis: good    Goals  STGs to be achieved in 4 weeks:  1. Pt to demonstrate reduced subjective pain rating \"at worst\" by at least 2-3 points from Initial Eval in order to allow for reduced pain with ADLs and improved functional activity tolerance. MET  2. Pt to demonstrate increased AROM of R shoulder in all deficient planes by at least 5-10 degrees in order to allow for greater ease and independence with ADLs and functional mobility. MET  3. Pt will report decrease in L knee symptoms by at least 25% to facilitate improved function. MET  4. Pt to demonstrate increased MMT of R shoulder flexion, abduction, and IR/ER by at least 1/2-1 grade in order to improve safety and stability with ADLs and functional mobility. PARTIALLY MET  5. Pt to improve L knee PROM/AROM by at least 5-10 degrees in order to allow for greater ease and independence with ADLs and functional mobility.  PROGRESSING  6. Pt to demonstrate increased MMT of L knee flexion and extension by at least 1/2-1 grade in order to improve safety and stability with ADLs and functional mobility. MET    LTGs to be achieved by discharge:  1. Pt will be I with HEP in order to continue to " improve quality of life and independence and reduce risk for re-injury. PROGRESSING  2. Pt to demonstrate return to all previous hobbies without limitations or restrictions  to improve QOL. PROGRESSING  3. Pt will demonstrate ability to perform stair climbing with reciprocal pattern PROGRESSING  4. Pt to demonstrate normalized gait pattern with no evidence of antalgic gait to demonstrate return to PLOF PROGRESSING  5. Pt will be able to reach/lift overhead with RUE without limitations or restrictions. PROGRESSING  6. 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 PROGRESSING          Plan  Patient would benefit from: skilled physical therapy  Planned modality interventions: cryotherapy and thermotherapy: hydrocollator packs    Planned therapy interventions: abdominal trunk stabilization, IASTM, joint mobilization, manual therapy, massage, motor coordination training, neuromuscular re-education, patient/caregiver education, strengthening, stretching, therapeutic activities, therapeutic exercise, home exercise program, flexibility, coordination, body mechanics training, postural training, gait training and balance    Frequency: 1-2x week  Duration in weeks: 8  Plan of Care beginning date: 2/20/2025  Plan of Care expiration date: 4/30/2025  Treatment plan discussed with: patient        Subjective Evaluation    History of Present Illness  Mechanism of injury: Pt is presenting to OPPT with chief complaint of  R shoulder and L knee.   Pt reports that R shoulder pain started with muscle spasms and pain that went down the entire arm that started about 6 months ago. Reports that she gets  a lot of mail and she feels the repetitive opening of mail may have caused the pain. Reports that the pain is located R side of neck, into R shoulder, and into R elbow. Reports that taking hot showers and stretching has helped to relieve some pain, but pain is still there. Reports that pain severity has decreased, but it  is still limiting daily activities that involve reaching overhead. Reports more pain with sudden movements. Denies any numbness/tingling. Reports she was seen by orthopedics on 2/10/25 and received steroid injection that helped to relieve pain.    Pt reports that L knee pain has been happening since September 2024. Reports that this pain has started gradually. Reports that it has led to hip/knee pain due to walking differently. Reports that she has to hold onto railing with steps and perform 1 step at a time due to balance and pain levels. Reports that pain is worse with walking/standing and stair climbing. Also reports pain with sitting for long period due to stiffness. Reports that it is also hard to turn quick. Reports pain in quadriceps as well. Reports she as also getting cramps. Denies any numbness/tingling. Denies any ELSA. Reports that knee pain started after seeing x-ray that showed her scoliosis and she feels that mentally this may have caused her pain.     3/27/25 Update: pt reporting steady improvement in shoulder and knee symptoms. Reports that R shoulder is not as painful and reports that she has better ROM. Reports that she feels she can walk better since starting Pt and knee is not as painful .Reports she has more L hip pain more than knee. Reports walking has been getting better overall. Reports overall she feels she is improving steadily. Reports that pain overall is less severe. Reports pain is worse at night. Reports that she feels like strength is improving as well. Reports that frequency of pain is the same, but pain severity improved.             Recurrent probem    Quality of life: good    Patient Goals  Patient goals for therapy: decreased pain, increased motion, increased strength, independence with ADLs/IADLs and return to sport/leisure activities  Patient goal: decrease pain, improve my function, get back to walking/riding bike PROGRESSING  Pain  Pain scale: R shoulder- 6/10 L knee-  7-8/10.  Pain scale at highest: R shoulder and L knee- 8/10.  Location: L knee, R shoulder  Quality: sharp, radiating, dull ache, discomfort and throbbing  Aggravating factors: overhead activity, lifting, standing, walking and stair climbing    Social Support  Stairs in house: yes   Lives in: multiple-level home  Lives with: alone    Employment status: not working  Hand dominance: right    Treatments  Current treatment: physical therapy        Objective     Postural Observations  Seated posture: fair  Standing posture: fair      Cervical/Thoracic Screen   Cervical range of motion within normal limits    Neurological Testing     Sensation     Shoulder   Left Shoulder   Intact: light touch    Right Shoulder   Intact: Light touch    Reflexes   Left   Wright's reflex: negative    Right   Wright's reflex: negative    Active Range of Motion     Right Shoulder   Flexion: 143 degrees with pain  Abduction: 130 degrees with pain  External rotation BTH: C3 with pain  Internal rotation BTB: L2 with pain    Lumbar   Flexion:  Restriction level: minimal  Extension:  Restriction level: moderate  Left lateral flexion:  Restriction level: moderate  Right lateral flexion:  Restriction level: moderate  Left Knee   Flexion: 121 degrees with pain  Extension: -4 degrees with pain    Passive Range of Motion     Right Shoulder   Normal passive range of motion    Strength/Myotome Testing     Left Shoulder     Planes of Motion   Flexion: 5   Extension: 5   Abduction: 5   External rotation at 0°: 5   Internal rotation at 0°: 5     Isolated Muscles   Biceps: 4+   Lower trapezius: 4   Middle trapezius: 4     Right Shoulder     Planes of Motion   Flexion: 4   Extension: 4+   Abduction: 4   External rotation at 0°: 4   Internal rotation at 0°: 4+     Isolated Muscles   Biceps: 4+   Lower trapezius: 4   Middle trapezius: 4     Lumbar   Left   Heel walk: normal  Toe walk: normal    Right   Heel walk: normal  Toe walk: normal    Left Hip   Planes  "of Motion   Flexion: 4  Extension: 4  Abduction: 4  Adduction: 4+  External rotation: 4  Internal rotation: 4    Right Hip   Planes of Motion   Flexion: 4+  Extension: 4  Abduction: 4  Adduction: 4+  External rotation: 4+  Internal rotation: 4+    Left Knee   Flexion: 4  Extension: 4    Right Knee   Flexion: 4+  Extension: 4+    Left Ankle/Foot   Dorsiflexion: 4+    Right Ankle/Foot   Dorsiflexion: 4+             Date 3/10 3/13 3/17 3/24  3/27   Visit Count 6 7 8 9  10   FOTO 3/10           Pain In             Pain Out                   Manuals 3/10 3/13 3/17 3/24  3/27   PROM R shoulder  PROM L hip into abduction/adductor stretch 8'  deferred                                                   Neuro Re-Ed             Tandem stance             Tandem walking             MTP/LTP   Red LTP 2x10 Red LTP 2x10       Ball circles CW/CCW on wall                                                       Ther Ex             Nustep with handles L3 10'  L3 10'  L3 10'  L3 15'   L3 10'    Heel slides             bridges 2x10 3-5\"            Hamstring stretch EOT hip flexor stretch no strap 3x1 min EOT hip flexor stretch 3x30\" without strap  EOT hip flexor stretch 3x30\" without strap  EOT hip flexor stretch 3x30\" without strap   EOT hip flexor stretch 3x30\" without strap    HR             LAQ             clamshells             Quad set             SLR          standing hip abduction 2x10 ea   Calf stretch          3x30\" with wedge    Step ups  Step downs              Leg ext/flx             Leg press  30# 2x10 2x10 25# 3x10 25# 3x10 25#     Squats -- squat matrix   2x10         Lateral lunges   LTR 10x10\" LTR 10x10\" LTR 10x10\"  LTR 10x10\"    UT/LS stretch             Scap retract   2x10 2x10 2x10     pulleys   deferred         Shoulder shrugs    20x/3-5\" 20x/3-5\" 20x/3-5\"  20x/3-5\"    ORVILLE   Red 2x10/3-5\" Red 2x10/3-5\" Red 2x10/3-5\"  green 2x10/3-5\"   Flx shoulder flex/scap             Open books             Table slides            "   Prone press up                           Ther Activity                                         Gait Training                                         Modalities             Cryo

## 2025-03-27 NOTE — PROGRESS NOTES
"PT Evaluation     Today's date: 3/27/2025  Patient name: Arnel Jensen  : 1945  MRN: [id number]  Referring provider: Grant Vieyra P*  Dx:   Encounter Diagnosis     ICD-10-CM    1. Primary osteoarthritis of left knee  M17.12       2. Internal derangement of right shoulder  M24.811             Start Time: 1430  Stop Time: 1515  Total time in clinic (min): 45 minutes    Assessment  Impairments: abnormal or restricted ROM, activity intolerance, impaired physical strength, lacks appropriate home exercise program, pain with function, participation limitations and activity limitations  Symptom irritability: moderate    Assessment details: Arnel Jensen has been consistent with attending PT sessions and performing HEP. Since SOC, noting improvement in AROM/PROM and strength of R shoulder and L knee. Pt demonstrating and reporting improvement in gait pattern and ability to ambulate for longer distances with less pain. Pt reporting improvement in pain severity in both the L knee and R shoulder with daily activities and hobbies. Improvement in overhead reaching and lifting noted as well with daily activities and with strengthening activities. Pt has been progressing towards achievement of STG/LTG slowly, but steadily. Pt will continue to benefit from skilled PT services 2x/wk for another 4 weeks to address continued impairments that continue to impact overall function, performance of ADLs/IADLs, and mobility. Exam findings were discussed with pt and all questions answered to pt satisfaction.         Understanding of Dx/Px/POC: good     Prognosis: good    Goals  STGs to be achieved in 4 weeks:  1. Pt to demonstrate reduced subjective pain rating \"at worst\" by at least 2-3 points from Initial Eval in order to allow for reduced pain with ADLs and improved functional activity tolerance. MET  2. Pt to demonstrate increased AROM of R shoulder in all deficient planes by at least 5-10 degrees in order to " allow for greater ease and independence with ADLs and functional mobility. MET  3. Pt will report decrease in L knee symptoms by at least 25% to facilitate improved function. MET  4. Pt to demonstrate increased MMT of R shoulder flexion, abduction, and IR/ER by at least 1/2-1 grade in order to improve safety and stability with ADLs and functional mobility. PARTIALLY MET  5. Pt to improve L knee PROM/AROM by at least 5-10 degrees in order to allow for greater ease and independence with ADLs and functional mobility.  PROGRESSING  6. Pt to demonstrate increased MMT of L knee flexion and extension by at least 1/2-1 grade in order to improve safety and stability with ADLs and functional mobility. MET    LTGs to be achieved by discharge:  1. Pt will be I with HEP in order to continue to improve quality of life and independence and reduce risk for re-injury. PROGRESSING  2. Pt to demonstrate return to all previous hobbies without limitations or restrictions  to improve QOL. PROGRESSING  3. Pt will demonstrate ability to perform stair climbing with reciprocal pattern PROGRESSING  4. Pt to demonstrate normalized gait pattern with no evidence of antalgic gait to demonstrate return to PLOF PROGRESSING  5. Pt will be able to reach/lift overhead with RUE without limitations or restrictions. PROGRESSING  6. 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 PROGRESSING          Plan  Patient would benefit from: skilled physical therapy  Planned modality interventions: cryotherapy and thermotherapy: hydrocollator packs    Planned therapy interventions: abdominal trunk stabilization, IASTM, joint mobilization, manual therapy, massage, motor coordination training, neuromuscular re-education, patient/caregiver education, strengthening, stretching, therapeutic activities, therapeutic exercise, home exercise program, flexibility, coordination, body mechanics training, postural training, gait training and  balance    Frequency: 1-2x week  Duration in weeks: 8  Plan of Care beginning date: 2/20/2025  Plan of Care expiration date: 4/30/2025  Treatment plan discussed with: patient        Subjective Evaluation    History of Present Illness  Mechanism of injury: Pt is presenting to OPPT with chief complaint of  R shoulder and L knee.   Pt reports that R shoulder pain started with muscle spasms and pain that went down the entire arm that started about 6 months ago. Reports that she gets  a lot of mail and she feels the repetitive opening of mail may have caused the pain. Reports that the pain is located R side of neck, into R shoulder, and into R elbow. Reports that taking hot showers and stretching has helped to relieve some pain, but pain is still there. Reports that pain severity has decreased, but it is still limiting daily activities that involve reaching overhead. Reports more pain with sudden movements. Denies any numbness/tingling. Reports she was seen by orthopedics on 2/10/25 and received steroid injection that helped to relieve pain.    Pt reports that L knee pain has been happening since September 2024. Reports that this pain has started gradually. Reports that it has led to hip/knee pain due to walking differently. Reports that she has to hold onto railing with steps and perform 1 step at a time due to balance and pain levels. Reports that pain is worse with walking/standing and stair climbing. Also reports pain with sitting for long period due to stiffness. Reports that it is also hard to turn quick. Reports pain in quadriceps as well. Reports she as also getting cramps. Denies any numbness/tingling. Denies any ELSA. Reports that knee pain started after seeing x-ray that showed her scoliosis and she feels that mentally this may have caused her pain.     3/27/25 Update: pt reporting steady improvement in shoulder and knee symptoms. Reports that R shoulder is not as painful and reports that she has better ROM.  Reports that she feels she can walk better since starting Pt and knee is not as painful .Reports she has more L hip pain more than knee. Reports walking has been getting better overall. Reports overall she feels she is improving steadily. Reports that pain overall is less severe. Reports pain is worse at night. Reports that she feels like strength is improving as well. Reports that frequency of pain is the same, but pain severity improved.             Recurrent probem    Quality of life: good    Patient Goals  Patient goals for therapy: decreased pain, increased motion, increased strength, independence with ADLs/IADLs and return to sport/leisure activities  Patient goal: decrease pain, improve my function, get back to walking/riding bike PROGRESSING  Pain  Pain scale: R shoulder- 6/10 L knee- 7-8/10.  Pain scale at highest: R shoulder and L knee- 8/10.  Location: L knee, R shoulder  Quality: sharp, radiating, dull ache, discomfort and throbbing  Aggravating factors: overhead activity, lifting, standing, walking and stair climbing    Social Support  Stairs in house: yes   Lives in: multiple-level home  Lives with: alone    Employment status: not working  Hand dominance: right    Treatments  Current treatment: physical therapy        Objective     Postural Observations  Seated posture: fair  Standing posture: fair      Cervical/Thoracic Screen   Cervical range of motion within normal limits    Neurological Testing     Sensation     Shoulder   Left Shoulder   Intact: light touch    Right Shoulder   Intact: Light touch    Reflexes   Left   Wright's reflex: negative    Right   Wright's reflex: negative    Active Range of Motion     Right Shoulder   Flexion: 143 degrees with pain  Abduction: 130 degrees with pain  External rotation BTH: C3 with pain  Internal rotation BTB: L2 with pain    Lumbar   Flexion:  Restriction level: minimal  Extension:  Restriction level: moderate  Left lateral flexion:  Restriction level:  "moderate  Right lateral flexion:  Restriction level: moderate  Left Knee   Flexion: 121 degrees with pain  Extension: -4 degrees with pain    Passive Range of Motion     Right Shoulder   Normal passive range of motion    Strength/Myotome Testing     Left Shoulder     Planes of Motion   Flexion: 5   Extension: 5   Abduction: 5   External rotation at 0°: 5   Internal rotation at 0°: 5     Isolated Muscles   Biceps: 4+   Lower trapezius: 4   Middle trapezius: 4     Right Shoulder     Planes of Motion   Flexion: 4   Extension: 4+   Abduction: 4   External rotation at 0°: 4   Internal rotation at 0°: 4+     Isolated Muscles   Biceps: 4+   Lower trapezius: 4   Middle trapezius: 4     Lumbar   Left   Heel walk: normal  Toe walk: normal    Right   Heel walk: normal  Toe walk: normal    Left Hip   Planes of Motion   Flexion: 4  Extension: 4  Abduction: 4  Adduction: 4+  External rotation: 4  Internal rotation: 4    Right Hip   Planes of Motion   Flexion: 4+  Extension: 4  Abduction: 4  Adduction: 4+  External rotation: 4+  Internal rotation: 4+    Left Knee   Flexion: 4  Extension: 4    Right Knee   Flexion: 4+  Extension: 4+    Left Ankle/Foot   Dorsiflexion: 4+    Right Ankle/Foot   Dorsiflexion: 4+             Date 3/10 3/13 3/17 3/24  3/27   Visit Count 6 7 8 9  10   FOTO 3/10           Pain In             Pain Out                   Manuals 3/10 3/13 3/17 3/24  3/27   PROM R shoulder  PROM L hip into abduction/adductor stretch 8'  deferred                                                   Neuro Re-Ed             Tandem stance             Tandem walking             MTP/LTP   Red LTP 2x10 Red LTP 2x10       Ball circles CW/CCW on wall                                                       Ther Ex             Nustep with handles L3 10'  L3 10'  L3 10'  L3 15'   L3 10'    Heel slides             bridges 2x10 3-5\"            Hamstring stretch EOT hip flexor stretch no strap 3x1 min EOT hip flexor stretch 3x30\" without strap  " "EOT hip flexor stretch 3x30\" without strap  EOT hip flexor stretch 3x30\" without strap   EOT hip flexor stretch 3x30\" without strap    HR             LAQ             clamshells             Quad set             SLR          standing hip abduction 2x10 ea   Calf stretch          3x30\" with wedge    Step ups  Step downs              Leg ext/flx             Leg press  30# 2x10 2x10 25# 3x10 25# 3x10 25#     Squats -- squat matrix   2x10         Lateral lunges   LTR 10x10\" LTR 10x10\" LTR 10x10\"  LTR 10x10\"    UT/LS stretch             Scap retract   2x10 2x10 2x10     pulleys   deferred         Shoulder shrugs    20x/3-5\" 20x/3-5\" 20x/3-5\"  20x/3-5\"    [place]   Red 2x10/3-5\" Red 2x10/3-5\" Red 2x10/3-5\"  green 2x10/3-5\"   Flx shoulder flex/scap             Open books             Table slides              Prone press up                           Ther Activity                                         Gait Training                                         Modalities             Cryo                                 "

## 2025-04-02 ENCOUNTER — OFFICE VISIT (OUTPATIENT)
Dept: PHYSICAL THERAPY | Facility: CLINIC | Age: 80
End: 2025-04-02
Payer: COMMERCIAL

## 2025-04-02 DIAGNOSIS — M17.12 PRIMARY OSTEOARTHRITIS OF LEFT KNEE: Primary | ICD-10-CM

## 2025-04-02 DIAGNOSIS — M24.811 INTERNAL DERANGEMENT OF RIGHT SHOULDER: ICD-10-CM

## 2025-04-02 PROCEDURE — 97110 THERAPEUTIC EXERCISES: CPT

## 2025-04-02 NOTE — PROGRESS NOTES
"Daily Note     Today's date: 2025  Patient name: Arnel Jensen  : 1945  MRN: [id number]  Referring provider: Grant Vieyra P*  Dx:   Encounter Diagnosis     ICD-10-CM    1. Primary osteoarthritis of left knee  M17.12       2. Internal derangement of right shoulder  M24.811           Start Time: 1615  Stop Time: 1700  Total time in clinic (min): 45 minutes    Subjective: \"If I do the exercises at home, it feels better. Slowly I do think I am getting better. My left hip just hurts\"       Objective: See treatment diary below      Assessment: Tolerated treatment well. Able to complete POC without incident. Pt performed standing SLR to progress hip strengthening this session with noted quick muscle fatigue. Continues to report improvement in mobility after performance of self stretches. Reviewed HEP with patient and importance of compliance. Pt reporting muscle soreness at end of session. Will cont to progress as tolerated. Patient demonstrated fatigue post treatment and would benefit from continued PT      Plan: Continue per plan of care.  Progress treatment as tolerated.       Date 4/2 3/13 3/17 3/24  3/27   Visit Count 11 7 8 9  10   FOTO            Pain In             Pain Out                   Manuals 4/2 3/13 3/17 3/24  3/27   PROM R shoulder   deferred                                                   Neuro Re-Ed             Tandem stance             Tandem walking             MTP/LTP   Red LTP 2x10 Red LTP 2x10       Ball circles CW/CCW on wall                                                       Ther Ex             Nustep with handles L3 10'  L3 10'  L3 10'  L3 15'   L3 10'    Heel slides             bridges            Hamstring stretch EOT hip flexor stretch  3x1 min with strap supine  EOT hip flexor stretch 3x30\" without strap  EOT hip flexor stretch 3x30\" without strap  EOT hip flexor stretch 3x30\" without strap   EOT hip flexor stretch 3x30\" without strap    HR             LAQ       " "      clamshells             Quad set             SLR  standing hip abduction/flexion 2x10 ea        standing hip abduction 2x10 ea   Calf stretch          3x30\" with wedge    Step ups  Step downs              Leg ext/flx             Leg press  3x10 25# 2x10 25# 3x10 25# 3x10 25#     Squats -- squat matrix   2x10         Lateral lunges   LTR 10x10\" LTR 10x10\" LTR 10x10\"  LTR 10x10\"    UT/LS stretch             Scap retract   2x10 2x10 2x10     pulleys   deferred         Shoulder shrugs   20x/3-5\" 20x/3-5\" 20x/3-5\" 20x/3-5\"  20x/3-5\"    ORVILLE  green 2x10/3-5\" Red 2x10/3-5\" Red 2x10/3-5\" Red 2x10/3-5\"  green 2x10/3-5\"   Flx shoulder flex/scap             Open books             Table slides              Prone press up                           Ther Activity                                         Gait Training                                         Modalities             Cryo                                      "

## 2025-04-03 ENCOUNTER — OFFICE VISIT (OUTPATIENT)
Dept: OBGYN CLINIC | Facility: CLINIC | Age: 80
End: 2025-04-03
Payer: COMMERCIAL

## 2025-04-03 VITALS — HEIGHT: 59 IN | WEIGHT: 159 LBS | BODY MASS INDEX: 32.05 KG/M2

## 2025-04-03 DIAGNOSIS — M16.12 PRIMARY OSTEOARTHRITIS OF ONE HIP, LEFT: Primary | ICD-10-CM

## 2025-04-03 PROCEDURE — 99213 OFFICE O/P EST LOW 20 MIN: CPT | Performed by: STUDENT IN AN ORGANIZED HEALTH CARE EDUCATION/TRAINING PROGRAM

## 2025-04-03 NOTE — PROGRESS NOTES
Ortho Sports Medicine Knee New Patient Visit     Assesment:   79 y.o. female with left hip pain, left knee pain, and right shoulder pain    Plan:  Reviewed history, physical exam, and imaging with the patient at time of visit.  Patient has been doing physical therapy since her left knee and right shoulder pain have improved.  In clinic today, she is more concerned with left hip pain.  We did pull up previous x-rays of her lumbar spine from 2018 which do show significant degenerative changes of the left hip.  Discussed with the patient that her left hip pain is most likely due to osteoarthritis.  We talked about potential treatment options including medications, injections, continued therapy, and surgery.  The patient would like to continue with physical therapy as she does feel that her symptoms are improving.  I discussed that she could follow-up in 6 to 8 weeks if her symptoms persist for further discussion of treatment options. The patient demonstrated understanding of the discussion and was in agreement with the plan.  All of the questions were answered.  Patient can reach out to clinic with any questions or concerns at any time.    Follow up: as needed  Imaging: Xrays left hip        Chief Complaint   Patient presents with    Right Shoulder - Pain       History of Present Illness:  Patient presents to the clinic with left hip pain, left knee pain, and right shoulder pain. With regards to her right shoulder, patient states she had complete relief of her symptoms form the cortisone injection completed on 2/10/2025. She has been compliant with physical therapy where she has noted improvement of her symptoms. She has been working on shoulder stretches and resistance bands. She has also been doing a home exercise program for the shoulder with relief. Patient does have some pain in the right shoulder with activities such as cutting papers. She has taken tumeric with some relief. She states she would like to try  "\"Instaflex\", which she found on an advertisement from the newspaper. She has not tried tylenol or any antiiflammatory medications. She avoids antiinflammatory medication due to GI upset. With regards to the left knee, she states her symptoms have improved since last visit. She feels that her left knee is is improving with physical therapy. Patient states her primary concern today is left hip pain that started 2 years ago with no specific injury. She has been working on the left hip with physical therapy with relief. She does note aggravating pain with prolonged periods of sitting. She denies any numbness or tingling. She has been ambulating with no aids.       Prior history:  The patient is a 79 y.o. female seen in clinic for left knee pain and right shoulder pain.  Patient states that she has had left knee pain for 2 to 3 months without any specific injury or inciting event.  She localizes pain over the anterior medial aspects of the knee.  She states that her symptoms are associated with stiffness, popping, and crepitus.  She states that walking and weightbearing aggravate her symptoms.  She has tried turmeric with some relief.  She is not taking any anti-inflammatories as it does irritate her stomach.  Patient states her symptoms are worsening.  She denies any prior surgeries or injuries to the knee.  Patient also states that she is having right shoulder pain localized over the lateral aspect of the shoulder.  She states it is worse with overhead movements.  She describes it as a pinching sensation.  She denies any neck pain or radicular symptoms.    The patient has the following co-morbidities: prediabetes (most recent A1C 6.3 from 1/28/2025), CKD, sciatica, chronic low back pain, opioid dependence    Knee Surgical History:  None    Past Medical, Social and Family History:  Past Medical History:   Diagnosis Date    Abnormal electrocardiogram     last assessed 1/8/16    Chronic kidney disease     Kidney stone     " Pneumonia of both lower lobes due to infectious organism 1/20/2024    Primary osteoarthritis involving multiple joints 11/7/2019    Shingles rash 3/4/2024     Past Surgical History:   Procedure Laterality Date    EYE SURGERY      TONSILLECTOMY      TOOTH EXTRACTION      TUBAL LIGATION       Allergies   Allergen Reactions    Influenza Virus Vaccine Swelling    Doxycycline Rash     Current Outpatient Medications on File Prior to Visit   Medication Sig Dispense Refill    cholecalciferol (VITAMIN D) 400 units/mL Take 400 Units by mouth daily      Multiple Minerals-Vitamins (CALCIUM-MAGNESIUM-ZINC-D3 PO) Take 1 tablet by mouth in the morning       No current facility-administered medications on file prior to visit.     Social History     Socioeconomic History    Marital status: /Civil Union     Spouse name: Not on file    Number of children: Not on file    Years of education: Not on file    Highest education level: Not on file   Occupational History    Occupation: Retired   Tobacco Use    Smoking status: Former     Types: Cigarettes     Passive exposure: Never    Smokeless tobacco: Never   Vaping Use    Vaping status: Never Used   Substance and Sexual Activity    Alcohol use: Yes     Comment: occasional    Drug use: No    Sexual activity: Not Currently   Other Topics Concern    Not on file   Social History Narrative    RETIRED     Social Drivers of Health     Financial Resource Strain: Low Risk  (5/31/2023)    Overall Financial Resource Strain (CARDIA)     Difficulty of Paying Living Expenses: Not very hard   Food Insecurity: No Food Insecurity (1/27/2025)    Hunger Vital Sign     Worried About Running Out of Food in the Last Year: Never true     Ran Out of Food in the Last Year: Never true   Transportation Needs: No Transportation Needs (1/27/2025)    PRAPARE - Transportation     Lack of Transportation (Medical): No     Lack of Transportation (Non-Medical): No   Physical Activity: Not on file   Stress: Not on  "file   Social Connections: Not on file   Intimate Partner Violence: Not on file   Housing Stability: Low Risk  (1/27/2025)    Housing Stability Vital Sign     Unable to Pay for Housing in the Last Year: No     Number of Times Moved in the Last Year: 0     Homeless in the Last Year: No         I have reviewed the past medical, surgical, social and family history, medications and allergies as documented in the EMR.    Review of systems: ROS is negative other than that noted in the HPI.  Constitutional: Negative for fatigue and fever.   HENT: Negative for sore throat.    Respiratory: Negative for shortness of breath.    Cardiovascular: Negative for chest pain.   Gastrointestinal: Negative for abdominal pain.   Endocrine: Negative for cold intolerance and heat intolerance.   Genitourinary: Negative for flank pain.   Musculoskeletal: Negative for back pain.   Skin: Negative for rash.   Allergic/Immunologic: Negative for immunocompromised state.   Neurological: Negative for dizziness.   Psychiatric/Behavioral: Negative for agitation.      Physical Exam:    Height 4' 11\" (1.499 m), weight 72.1 kg (159 lb).    General/Constitutional: NAD, well developed, well nourished  HENT: Normocephalic, atraumatic  CV: Intact distal pulses, regular rate  Resp: No respiratory distress or labored breathing  Neuro: Alert and Oriented x 3  Psych: Normal mood, normal affect, normal judgement, normal behavior  Skin: Warm, dry, no rashes, no erythema    Focused left Hip Exam:  - Skin: intact  - Dislocation/deformity: no  - ROM: slightly limited internal and external rotation, painless. No pain with passive circumduction with no mechanical block  - TTP greater trochanter: no  - TTP SI joint: no  - Alfonzo test: negative  - Ayden's test: negative  - Abduction: 5/5, painless    Back:    No TTP over lumbar spinous processes, paraspinal musculature  Negative SLR     No calf tenderness to palpation     LE NV Exam:   +2 DP/PT pulses bilaterally  Foot " warm, well perfused  Sensation intact to light touch L2-S1 bilaterally, SPN/DPN/tibial/saphenous/sural nerve distributions  Motor intact in SPN/DPN/TA nerve distributions     Focused left knee exam:  Ambulates with a antalgic gait. No ambulatory aids.     Skin is intact. No evidence of ecchymosis, erythema, gross deformity or previous surgical incisions. negative effusion.     Palpation of the knee demonstrates no tenderness over the medial patellar facet, lateral patellar facet, tibial tubercle or patellar tendon.  There is no tenderness over the pes anserine bursa.  There is no tenderness over Gerdy's tubercle. There is no tenderness in the popliteal fossa.  There is no tenderness over the medial or lateral epicondyle.  There is no tenderness with palpation over the posterior calf without palpable cords.    Range of motion testing demonstrates that the patient is able to achieve 0 degrees of extension and 120 degrees of flexion. There is no pain with hyperflexion or hyperextension.  There is negative patellar crepitus. The patient is able to do a straight leg raise.      Strength testing demonstrates 5/5 strength with hip flexion, 5/5 knee extension, 5/5 knee flexion, and 5/5 dorsiflexion and plantarflexion.    Provocation testing demonstrates  Mensicus- negative medial joint line tenderness, negative lateral joint line tenderness, negative Yenny's, negative flexion compression.  Collateral ligaments- negative varus laxity at full extension and in 30° of knee flexion, negative valgus laxity at full extension and in 30° of knee flexion.  Cruciate ligament- 1A Lachman examination, negative pivot shift test, 1A anterior drawer, 1A posterior drawer, negative posterior sag.  Patella- negative apprehension with lateral patellar translation (able to sublux 2 quadrant with firm endpoint), negative apprehension with medial patellar translation (able to sublux 2 quadrant with firm endpoint), negative J-sign, negative  patellar grind test, negative tight lateral patellar tilt.    Range of motion testing of the hip and ankle are within normal limits.    No calf tenderness to palpation bilaterally    LE NV Exam: +2 DP/PT pulses bilaterally  Sensation intact to light touch L2-S1 bilaterally, SPN/DPN/TA motor intact       Focused right shoulder exam:  No paracervical tenderness.   No cervical tenderness.   No pain with neck flexion, extension, side-to-side bending, or rotation.   Negative Spurling's bilaterally.    The skin is intact without evidence of erythema or ecchymosis. Symmetric shoulders with no evidence of supraspinatus or infraspinatus muscle atrophy. There is no evidence neurologic medial or lateral scapular winging. Normal scapular positioning.    Palpation demonstrates no tenderness over the SC joint, bilateral clavicle, lateral aspect of the acromion or posterior joint line, AC joint, or bicipital groove.    Shoulder ROM demonstrates 160 degrees of active and 160 degrees of passive forward elevation. External rotation with the arms at the side demonstrates 60 degree of active and 60 degrees of passive motion.  Internal rotation is to L4. Crepitus noted with range of motion.     Strength testing demonstrates 5/5 strength in empty can position, 5/5 with resisted external rotation with the arm at the side.  5/5 strength of the subscapularis and a negative belly press.  Hornblower sign is negative, external rotation lag sign is negative.     Provocative testing for the following demonstrate-  Impingement- negative Hawkin's impingement test, negative Neer impingement sign.  Biceps- negative Yeagerson, negative Speeds test.  Stability- negative apprehension sign and a negative relocation test, negative anterior load and shift, negative posterior load and shift testing, negative Aisha test and a negative Jerk test.  Labrum- negative Steuben test, negative sulcus sign      UE NV Exam: +2 Radial pulses bilaterally. Fingers are  warm and well-perfused.  Sensation intact to light touch C5-T1 bilaterally, Radial/median/ulnar nerve motor intact       Knee Imaging    X-rays of the left knee were obtained on 1/27/2025 and reviewed with the patient. Per my independent review, the imaging shows no evidence of fracture, dislocation, or significant degenerative disease.      Shoulder Imaging    X-rays of the right shoulder were obtained on 2/10/2025 and reviewed with the patient. Per my independent review, the imaging shows no evidence of fracture, dislocation, or significant degenerative disease.  There are small calcifications noted along the lateral aspect of the humeral head which may represent calcific tendinitis.      Procedures:        Scribe Attestation      I,:  Grant Vieyra PA-C am acting as a scribe while in the presence of the attending physician.:       I,:  Carlos Andres MD personally performed the services described in this documentation    as scribed in my presence.:

## 2025-04-07 ENCOUNTER — OFFICE VISIT (OUTPATIENT)
Dept: PHYSICAL THERAPY | Facility: CLINIC | Age: 80
End: 2025-04-07
Payer: COMMERCIAL

## 2025-04-07 DIAGNOSIS — M24.811 INTERNAL DERANGEMENT OF RIGHT SHOULDER: ICD-10-CM

## 2025-04-07 DIAGNOSIS — M17.12 PRIMARY OSTEOARTHRITIS OF LEFT KNEE: Primary | ICD-10-CM

## 2025-04-07 PROCEDURE — 97110 THERAPEUTIC EXERCISES: CPT

## 2025-04-07 NOTE — PROGRESS NOTES
"Daily Note     Today's date: 2025  Patient name: Arnel Jensen  : 1945  MRN: [id number]  Referring provider: Grant Vieyra P*  Dx:   Encounter Diagnosis     ICD-10-CM    1. Primary osteoarthritis of left knee  M17.12       2. Internal derangement of right shoulder  M24.811           Start Time: 1345  Stop Time: 1430  Total time in clinic (min): 45 minutes    Subjective: \"I am feeling better.  The pain isn't as bad.  I still have pain in my hip. My shoulder really hurts but I have been using it more getting rid of mail.\"      Objective: See treatment diary below      Assessment: Tolerated treatment well. Challenged with current program and wishes to not progress with exercises 2* fear of increasing pain.  Good response to quad stretch.  Pt did increase resistance without change in symptoms.  Will continue to monitor and progress as able.   Patient demonstrated fatigue post treatment and would benefit from continued PT      Plan: Continue per plan of care.  Progress treatment as tolerated.         Date       Visit Count 11 12      FOTO            Pain In             Pain Out                   Manuals       PROM R shoulder                                                      Neuro Re-Ed             Tandem stance             Tandem walking            MTP/LTP   Grn 2x10 ea        Ball circles CW/CCW on wall                                                       Ther Ex             Nustep with handles L3 10'  L3 10'       Heel slides             bridges            Hamstring stretch EOT hip flexor stretch  3x1 min with strap supine  EOT hip flexor stretch 3x30\" without strap       HR             LAQ             clamshells             Quad set             SLR  standing hip abduction/flexion 2x10 ea  standing hip abduction/flexion 2x10 ea         Calf stretch             Step ups  Step downs              Leg ext/flx             Leg press  3x10 25# 3x10 25#       Squats -- squat matrix   " "2x10         Lateral lunges         UT/LS stretch             Scap retract          pulleys          Shoulder shrugs  20x/3-5\" 1# 20x/3-5\"      ORVILLE  green 3x10/3-5\" green 3x10/3-5\"      Flx shoulder flex/scap             Open books             Table slides              Prone press up                           Ther Activity                                         Gait Training                                         Modalities             Cryo                                        "

## 2025-04-10 ENCOUNTER — OFFICE VISIT (OUTPATIENT)
Dept: PHYSICAL THERAPY | Facility: CLINIC | Age: 80
End: 2025-04-10
Payer: COMMERCIAL

## 2025-04-10 DIAGNOSIS — M24.811 INTERNAL DERANGEMENT OF RIGHT SHOULDER: ICD-10-CM

## 2025-04-10 DIAGNOSIS — M17.12 PRIMARY OSTEOARTHRITIS OF LEFT KNEE: Primary | ICD-10-CM

## 2025-04-10 PROCEDURE — 97110 THERAPEUTIC EXERCISES: CPT

## 2025-04-10 NOTE — PROGRESS NOTES
"Daily Note     Today's date: 4/10/2025  Patient name: Arnel Jensen  : 1945  MRN: [id number]  Referring provider: Grant Vieyra P*  Dx:   Encounter Diagnosis     ICD-10-CM    1. Primary osteoarthritis of left knee  M17.12       2. Internal derangement of right shoulder  M24.811           Start Time: 1345  Stop Time: 1440  Total time in clinic (min): 55 minutes    Subjective: \"I am getting better.  I still have a little pain my hip but not like it was.  It doesn't wake me up at night like it did.\"      Objective: See treatment diary below      Assessment: Tolerated treatment well. Able to increase reps on resistance exercise without incident.  Continues with good response to quad stretching.  Decreased endurance this date needing increased seated rest.  Will continue to monitor and progress as able.  Patient demonstrated fatigue post treatment and would benefit from continued PT      Plan: Continue per plan of care.  Progress treatment as tolerated.         Date 4/2 4/7 4/10     Visit Count 11 12 13     FOTO            Pain In             Pain Out                   Manuals 4/2 4/7 4/10     PROM R shoulder                                                      Neuro Re-Ed             Tandem stance             Tandem walking            MTP/LTP   Grn 2x10 ea Grn 3x10 ea       Ball circles CW/CCW on wall                                                       Ther Ex             Nustep with handles L3 10'  L3 10'  L3 12'      Heel slides             bridges            Hamstring stretch EOT hip flexor stretch  3x1 min with strap supine  EOT hip flexor stretch 3x30\" without strap  EOT hip flexor stretch 3x30\" without strap      HR             LAQ             clamshells             Quad set             SLR  standing hip abduction/flexion 2x10 ea  standing hip abduction/flexion 2x10 ea  standing hip abduction/flexion 2x10 ea       Calf stretch             Step ups  Step downs              Leg ext/flx         " "    Leg press  3x10 25# 3x10 25# 3x10 25#      Squats -- squat matrix   2x10  2x10       Lateral lunges         UT/LS stretch             Scap retract          pulleys          Shoulder shrugs  20x/3-5\" 1# 20x/3-5\" 1# 30x/3-5\"     ORV[place]  green 3x10/3-5\" green 3x10/3-5\" green 3x10/3-5\"     Flx shoulder flex/scap             Open books             Table slides              Prone press up                           Ther Activity                                         Gait Training                                         Modalities             Cryo                                          "

## 2025-04-14 ENCOUNTER — OFFICE VISIT (OUTPATIENT)
Dept: PHYSICAL THERAPY | Facility: CLINIC | Age: 80
End: 2025-04-14
Payer: COMMERCIAL

## 2025-04-14 DIAGNOSIS — M24.811 INTERNAL DERANGEMENT OF RIGHT SHOULDER: ICD-10-CM

## 2025-04-14 DIAGNOSIS — M17.12 PRIMARY OSTEOARTHRITIS OF LEFT KNEE: Primary | ICD-10-CM

## 2025-04-14 PROCEDURE — 97110 THERAPEUTIC EXERCISES: CPT

## 2025-04-14 NOTE — PROGRESS NOTES
"Daily Note     Today's date: 2025  Patient name: Arnel Jensen  : 1945  MRN: [id number]  Referring provider: Grant Vieyra P*  Dx:   Encounter Diagnosis     ICD-10-CM    1. Primary osteoarthritis of left knee  M17.12       2. Internal derangement of right shoulder  M24.811           Start Time: 1345  Stop Time: 1435  Total time in clinic (min): 50 minutes    Subjective: \"I am feeling better.  I have less pain in my hip today.\"      Objective: See treatment diary below      Assessment: Tolerated treatment well. Able to increase reps and weight on various exercises without incident.  Pt apprehensive to increase weight on leg press 2* fear of exacerbation of symptoms.  Will continue to monitor and progress as able.  Patient demonstrated fatigue post treatment and would benefit from continued PT      Plan: Continue per plan of care.  Progress treatment as tolerated.         Date 4/2 4/7 4/10 4/14    Visit Count 11 12 13 14    FOTO            Pain In             Pain Out                   Manuals 4/2 4/7 4/10 4/14    PROM R shoulder                                                      Neuro Re-Ed             Tandem stance             Tandem walking            MTP/LTP   Grn 2x10 ea Grn 3x10 ea  Grn 3x10 ea     Ball circles CW/CCW on wall                                                       Ther Ex             Nustep with handles L3 10'  L3 10'  L3 12'  L3 12'     Heel slides             bridges            Hamstring stretch EOT hip flexor stretch  3x1 min with strap supine  EOT hip flexor stretch 3x30\" without strap  EOT hip flexor stretch 3x30\" without strap  EOT hip flexor stretch 3x30\" without strap     HR             LAQ             clamshells             Quad set             SLR  standing hip abduction/flexion 2x10 ea  standing hip abduction/flexion 2x10 ea  standing hip abduction/flexion 2x10 ea  standing hip abduction/flexion 2x10 ea     Calf stretch             Step ups  Step downs        " "      Leg ext/flx             Leg press  3x10 25# 3x10 25# 3x10 25# 3x10 25#     Squats -- squat matrix   2x10  2x10  2x10     Lateral lunges         UT/LS stretch             Scap retract          pulleys          Shoulder shrugs  20x/3-5\" 1# 20x/3-5\" 1# 30x/3-5\" 2# 30x/3-5\"    ORVILLE  green 3x10/3-5\" green 3x10/3-5\" green 3x10/3-5\" green 3x10/3-5\"    Flx shoulder flex/scap             Open books             Table slides              Prone press up                           Ther Activity                                         Gait Training                                         Modalities             Cryo                                            "

## 2025-04-17 ENCOUNTER — OFFICE VISIT (OUTPATIENT)
Dept: PHYSICAL THERAPY | Facility: CLINIC | Age: 80
End: 2025-04-17
Payer: COMMERCIAL

## 2025-04-17 DIAGNOSIS — M17.12 PRIMARY OSTEOARTHRITIS OF LEFT KNEE: Primary | ICD-10-CM

## 2025-04-17 DIAGNOSIS — M24.811 INTERNAL DERANGEMENT OF RIGHT SHOULDER: ICD-10-CM

## 2025-04-17 PROCEDURE — 97110 THERAPEUTIC EXERCISES: CPT

## 2025-04-17 NOTE — PROGRESS NOTES
"Daily Note     Today's date: 2025  Patient name: Arnel Jensen  : 1945  MRN: [id number]  Referring provider: Grant Vieyra P*  Dx:   Encounter Diagnosis     ICD-10-CM    1. Primary osteoarthritis of left knee  M17.12       2. Internal derangement of right shoulder  M24.811           Start Time: 1345  Stop Time: 1432  Total time in clinic (min): 47 minutes    Subjective: \"I do not feel like my hip hurts as much. My shoulder has been feeling pretty good as well. I am getting better\"      Objective: See treatment diary below      Assessment: Tolerated treatment well. Able to complete POC without incident. Pt apprehensive to progression of program but able to perform progressions without incident and with appropriate muscle fatigue without increase in pain. Able to increases resistance with leg press and with MTP/LTP this session without incident. Improving hip and gluteal strength and activation noted. Plan for discharge next session as pt appears to have reached plateau with skilled PT services. Patient demonstrated fatigue post treatment and would benefit from continued PT      Plan: Continue per plan of care.  Potential discharge next visit.       Date 4/2 4/7 4/10 4/14 4/17   Visit Count 11 12 13 14 15   FOTO            Pain In             Pain Out                   Manuals 4/2 4/7 4/10 4/14 4/17   PROM R shoulder                                                      Neuro Re-Ed             Tandem stance             Tandem walking            MTP/LTP   Grn 2x10 ea Grn 3x10 ea  Grn 3x10 ea  blue 3x10 ea   Ball circles CW/CCW on wall                                                       Ther Ex             Nustep with handles L3 10'  L3 10'  L3 12'  L3 12'  L3 11'   Heel slides             bridges            Hamstring stretch EOT hip flexor stretch  3x1 min with strap supine  EOT hip flexor stretch 3x30\" without strap  EOT hip flexor stretch 3x30\" without strap  EOT hip flexor stretch 3x30\" " "without strap  EOT hip flexor stretch 3x30\"    HR             LAQ             clamshells          standing gluteal push out on wall green stability ball LLE  2x10 5\"   Quad set             SLR  standing hip abduction/flexion 2x10 ea  standing hip abduction/flexion 2x10 ea  standing hip abduction/flexion 2x10 ea  standing hip abduction/flexion 2x10 ea  standing hip abduction/flexion 2x10 ea   Calf stretch             Step ups  Step downs              Leg ext/flx             Leg press  3x10 25# 3x10 25# 3x10 25# 3x10 25#  3x10 30#   Squats -- squat matrix   2x10  2x10  2x10  2x10   Lateral lunges         UT/LS stretch             Scap retract          pulleys          Shoulder shrugs  20x/3-5\" 1# 20x/3-5\" 1# 30x/3-5\" 2# 30x/3-5\"    [place]  green 3x10/3-5\" green 3x10/3-5\" green 3x10/3-5\" green 3x10/3-5\" Green 3x10/3-5\"   Flx shoulder flex/scap             Open books             Table slides              Prone press up                           Ther Activity                                         Gait Training                                         Modalities             Cryo                                              "

## 2025-04-21 ENCOUNTER — APPOINTMENT (OUTPATIENT)
Dept: PHYSICAL THERAPY | Facility: CLINIC | Age: 80
End: 2025-04-21
Payer: COMMERCIAL

## 2025-04-24 ENCOUNTER — OFFICE VISIT (OUTPATIENT)
Dept: PHYSICAL THERAPY | Facility: CLINIC | Age: 80
End: 2025-04-24
Payer: COMMERCIAL

## 2025-04-24 DIAGNOSIS — M24.811 INTERNAL DERANGEMENT OF RIGHT SHOULDER: ICD-10-CM

## 2025-04-24 DIAGNOSIS — M17.12 PRIMARY OSTEOARTHRITIS OF LEFT KNEE: Primary | ICD-10-CM

## 2025-04-24 PROCEDURE — 97110 THERAPEUTIC EXERCISES: CPT

## 2025-04-24 NOTE — PROGRESS NOTES
"Daily Note     Today's date: 2025  Patient name: Arnel Jensen  : 1945  MRN: [id number]  Referring provider: Grant Vieyra P*  Dx:   Encounter Diagnosis     ICD-10-CM    1. Primary osteoarthritis of left knee  M17.12       2. Internal derangement of right shoulder  M24.811           Start Time: 1345  Stop Time: 1430  Total time in clinic (min): 45 minutes    Subjective: \"I walked a lot and did more since last time I was here and now I am more sore\"       Objective: See treatment diary below      Assessment: Tolerated treatment well. Symptoms more irritable this session due to increased activity since last session. Improved ROM with SLR this session. Able to progress resistance with leg press this session with appropriate muscle fatigue. Reporting muscle soreness at end of session; most soreness reported in L hip flexor region. Will cont to progress as tolerated.  Patient demonstrated fatigue post treatment and would benefit from continued PT      Plan: Continue per plan of care.  Progress treatment as tolerated.         Date 4/24 4/7 4/10 4/14 4/17   Visit Count 16 12 13 14 15   FOTO            Pain In             Pain Out                   Manuals 4/24 4/7 4/10 4/14 4/17   PROM R shoulder                                                      Neuro Re-Ed             Tandem stance             Tandem walking            MTP/LTP   Grn 2x10 ea Grn 3x10 ea  Grn 3x10 ea  blue 3x10 ea   Ball circles CW/CCW on wall                                                       Ther Ex             Nustep with handles L3 10'  L3 10'  L3 12'  L3 12'  L3 11'   Heel slides             bridges            Hamstring stretch EOT hip flexor stretch 3x30\"  EOT hip flexor stretch 3x30\" without strap  EOT hip flexor stretch 3x30\" without strap  EOT hip flexor stretch 3x30\" without strap  EOT hip flexor stretch 3x30\"    HR             LAQ             clamshells          standing gluteal push out on wall green stability " "ball LLE  2x10 5\"   Quad set             SLR  standing hip abduction/flexion 2x10 ea  standing hip abduction/flexion 2x10 ea  standing hip abduction/flexion 2x10 ea  standing hip abduction/flexion 2x10 ea  standing hip abduction/flexion 2x10 ea   Calf stretch             Step ups  Step downs              Leg ext/flx             Leg press  3x10 40# 3x10 25# 3x10 25# 3x10 25#  3x10 30#   Squats -- squat matrix  2x10  2x10  2x10  2x10  2x10   Lateral lunges         UT/LS stretch             Scap retract          pulleys          Shoulder shrugs  20x/3-5\" 1# 20x/3-5\" 1# 30x/3-5\" 2# 30x/3-5\"    [place]  green 3x10/3-5\" green 3x10/3-5\" green 3x10/3-5\" green 3x10/3-5\" Green 3x10/3-5\"   Flx shoulder flex/scap             Open books             Table slides              Prone press up                           Ther Activity                                         Gait Training                                         Modalities             Cryo                                                "

## 2025-05-01 ENCOUNTER — OFFICE VISIT (OUTPATIENT)
Dept: PHYSICAL THERAPY | Facility: CLINIC | Age: 80
End: 2025-05-01
Payer: COMMERCIAL

## 2025-05-01 DIAGNOSIS — M24.811 INTERNAL DERANGEMENT OF RIGHT SHOULDER: ICD-10-CM

## 2025-05-01 DIAGNOSIS — M17.12 PRIMARY OSTEOARTHRITIS OF LEFT KNEE: Primary | ICD-10-CM

## 2025-05-01 PROCEDURE — 97110 THERAPEUTIC EXERCISES: CPT

## 2025-05-01 NOTE — PROGRESS NOTES
"Daily Note     Today's date: 2025  Patient name: Anrel Jensen  : 1945  MRN: [id number]  Referring provider: Grant Vieyra P*  Dx:   Encounter Diagnosis     ICD-10-CM    1. Primary osteoarthritis of left knee  M17.12       2. Internal derangement of right shoulder  M24.811           Start Time: 1345  Stop Time: 1430  Total time in clinic (min): 45 minutes    Subjective: \"I am walking better.  I still have pain in my back and hip but it's feeling better.\"      Objective: See treatment diary below      Assessment: Tolerated treatment well. Able to complete program without incident.  Continues with antalgic gait 2* L hip pain.  Anticipate discharge after next session.  Will continue to monitor and progress as able.  Patient demonstrated fatigue post treatment and would benefit from continued PT      Plan: Continue per plan of care.  Progress treatment as tolerated.         Date       Visit Count 16 17      FOTO            Pain In             Pain Out                   Manuals       PROM R shoulder                                                      Neuro Re-Ed             Tandem stance             Tandem walking            MTP/LTP   Grn 2x10 ea      Ball circles CW/CCW on wall                                                       Ther Ex             Nustep with handles L3 10'  L3 12'       Heel slides             bridges            Hamstring stretch EOT hip flexor stretch 3x30\"  EOT hip flexor stretch 3x30\" without strap       HR             LAQ            clamshells            Quad set             SLR  standing hip abduction/flexion 2x10 ea  standing hip abduction/flexion 2x10 ea      Calf stretch             Step ups  Step downs              Leg ext/flx             Leg press  3x10 40# 3x10 40#      Squats -- squat matrix  2x10        Lateral lunges         UT/LS stretch             Scap retract          pulleys          Shoulder shrugs  20x/3-5\" 1# 20x/3-5\"      [place]  green " "3x10/3-5\" green 3x10/3-5\"      Flx shoulder flex/scap             Open books             Table slides              Prone press up                           Ther Activity                                         Gait Training                                         Modalities             Cryo                                                  "

## 2025-05-02 ENCOUNTER — OFFICE VISIT (OUTPATIENT)
Dept: PHYSICAL THERAPY | Facility: CLINIC | Age: 80
End: 2025-05-02
Payer: COMMERCIAL

## 2025-05-02 DIAGNOSIS — M17.12 PRIMARY OSTEOARTHRITIS OF LEFT KNEE: Primary | ICD-10-CM

## 2025-05-02 DIAGNOSIS — M24.811 INTERNAL DERANGEMENT OF RIGHT SHOULDER: ICD-10-CM

## 2025-05-02 PROCEDURE — 97110 THERAPEUTIC EXERCISES: CPT

## 2025-05-02 NOTE — PROGRESS NOTES
"Daily Note/Discharge    Today's date: 2025  Patient name: Arnel Jensen  : 1945  MRN: [id number]  Referring provider: Grant Vieyra P*  Dx:   Encounter Diagnosis     ICD-10-CM    1. Primary osteoarthritis of left knee  M17.12       2. Internal derangement of right shoulder  M24.811           Start Time: 1045  Stop Time: 1130  Total time in clinic (min): 45 minutes    Subjective: \"I am walking better but am a little sore today\"       Objective: See treatment diary below      Assessment: Tolerated treatment well. Able to complete POC without incident. Able to complete there ex with improved ROM since last progress note and with less reports of discomfort. Noting continued adductor tightness; reviewed seated stretch with appropriate stretch felt. Improvement noted in observational gait. Noting steady hip and knee strength gains. Patient  appears to have reached plateau with skilled PT services  . Plan for patient to be discharged from formal PT services and transition to HEP. Pt understanding and in agreement. Thoroughly reviewed HEP. All questions answered to patient satisfaction.       Plan:  Discharge from formal PT services and transition to HEP       Date      Visit Count 16 17 18     FOTO            Pain In             Pain Out                   Manuals      PROM R shoulder                                                      Neuro Re-Ed             Tandem stance             Tandem walking            MTP/LTP   Grn 2x10 ea Blue 2x10 ea     Ball circles CW/CCW on wall                                                       Ther Ex             Nustep with handles L3 10'  L3 12'  L3 10'      Heel slides             bridges            Hamstring stretch EOT hip flexor stretch 3x30\"  EOT hip flexor stretch 3x30\" without strap  Seated hip adductor stretch 2x30\"      EOT hip flexor stretch 3x30\" without strap             HR             LAQ            clamshells          " "  Quad set             SLR  standing hip abduction/flexion 2x10 ea  standing hip abduction/flexion 2x10 ea standing hip abduction/flexion 2x10 ea    HKM x20     Calf stretch             Step ups  Step downs              Leg ext/flx             Leg press  3x10 40# 3x10 40#      Squats -- squat matrix  2x10        Lateral lunges         UT/LS stretch             Scap retract          pulleys          Shoulder shrugs  20x/3-5\" 1# 20x/3-5\" 1# 20x/3-5\"     [place]  green 3x10/3-5\" green 3x10/3-5\" green 3x10/3-5\"     Flx shoulder flex/scap             Open books             Table slides              Prone press up                           Ther Activity                                         Gait Training                                         Modalities             Cryo                                                    "

## 2025-05-21 ENCOUNTER — TELEPHONE (OUTPATIENT)
Dept: FAMILY MEDICINE CLINIC | Facility: CLINIC | Age: 80
End: 2025-05-21

## 2025-05-21 NOTE — TELEPHONE ENCOUNTER
Patient called the RX Refill Line. Message is being forwarded to the office.     Patient is requesting a refill on   cyclobenzaprine (FLEXERIL) 5 mg tablet. She is going to [place] and needs to take this med with her.  It is no longer on her active med list and she states she takes it as needed.  If appropriate, please send to [facility]. Was last ordered by Marianne Alvarez    Please contact patient at [phone] with any questions

## 2025-05-22 DIAGNOSIS — M62.830 BACK MUSCLE SPASM: Primary | ICD-10-CM

## 2025-05-22 RX ORDER — CYCLOBENZAPRINE HCL 10 MG
10 TABLET ORAL 3 TIMES DAILY PRN
Qty: 90 TABLET | Refills: 0 | Status: SHIPPED | OUTPATIENT
Start: 2025-05-22

## 2025-07-29 ENCOUNTER — OFFICE VISIT (OUTPATIENT)
Dept: FAMILY MEDICINE CLINIC | Facility: CLINIC | Age: 80
End: 2025-07-29
Payer: COMMERCIAL

## 2025-07-29 ENCOUNTER — APPOINTMENT (OUTPATIENT)
Dept: RADIOLOGY | Facility: CLINIC | Age: 80
End: 2025-07-29
Payer: COMMERCIAL

## 2025-07-29 VITALS
RESPIRATION RATE: 15 BRPM | BODY MASS INDEX: 31.45 KG/M2 | HEART RATE: 73 BPM | HEIGHT: 59 IN | WEIGHT: 156 LBS | SYSTOLIC BLOOD PRESSURE: 109 MMHG | TEMPERATURE: 97.7 F | DIASTOLIC BLOOD PRESSURE: 55 MMHG | OXYGEN SATURATION: 96 %

## 2025-07-29 DIAGNOSIS — R06.02 SHORTNESS OF BREATH: Primary | ICD-10-CM

## 2025-07-29 DIAGNOSIS — M15.0 PRIMARY OSTEOARTHRITIS INVOLVING MULTIPLE JOINTS: ICD-10-CM

## 2025-07-29 DIAGNOSIS — M54.32 SCIATICA OF LEFT SIDE: ICD-10-CM

## 2025-07-29 DIAGNOSIS — K21.9 GERD WITHOUT ESOPHAGITIS: Chronic | ICD-10-CM

## 2025-07-29 DIAGNOSIS — R06.02 SHORTNESS OF BREATH: ICD-10-CM

## 2025-07-29 PROCEDURE — 99213 OFFICE O/P EST LOW 20 MIN: CPT

## 2025-07-29 PROCEDURE — 71046 X-RAY EXAM CHEST 2 VIEWS: CPT

## 2025-08-04 ENCOUNTER — RESULTS FOLLOW-UP (OUTPATIENT)
Dept: FAMILY MEDICINE CLINIC | Facility: CLINIC | Age: 80
End: 2025-08-04

## 2025-08-04 DIAGNOSIS — R93.89 ABNORMAL FINDING ON IMAGING: Primary | ICD-10-CM

## 2025-08-04 DIAGNOSIS — R06.02 SHORTNESS OF BREATH: ICD-10-CM

## 2025-08-08 ENCOUNTER — HOSPITAL ENCOUNTER (OUTPATIENT)
Dept: CT IMAGING | Facility: HOSPITAL | Age: 80
End: 2025-08-08
Payer: COMMERCIAL

## 2025-08-14 ENCOUNTER — TELEPHONE (OUTPATIENT)
Dept: FAMILY MEDICINE CLINIC | Facility: CLINIC | Age: 80
End: 2025-08-14

## 2025-08-19 DIAGNOSIS — J84.112 UIP (USUAL INTERSTITIAL PNEUMONITIS) (HCC): Primary | ICD-10-CM

## 2025-08-21 ENCOUNTER — TELEPHONE (OUTPATIENT)
Age: 80
End: 2025-08-21

## 2025-08-21 ENCOUNTER — TELEPHONE (OUTPATIENT)
Dept: PULMONOLOGY | Facility: CLINIC | Age: 80
End: 2025-08-21